# Patient Record
Sex: FEMALE | Race: WHITE | Employment: OTHER | URBAN - METROPOLITAN AREA
[De-identification: names, ages, dates, MRNs, and addresses within clinical notes are randomized per-mention and may not be internally consistent; named-entity substitution may affect disease eponyms.]

---

## 2017-04-06 ENCOUNTER — DOCUMENTATION ONLY (OUTPATIENT)
Dept: INTERNAL MEDICINE CLINIC | Age: 72
End: 2017-04-06

## 2017-04-10 ENCOUNTER — APPOINTMENT (OUTPATIENT)
Dept: ULTRASOUND IMAGING | Age: 72
DRG: 438 | End: 2017-04-10
Attending: PHYSICIAN ASSISTANT
Payer: COMMERCIAL

## 2017-04-10 ENCOUNTER — APPOINTMENT (OUTPATIENT)
Dept: GENERAL RADIOLOGY | Age: 72
DRG: 438 | End: 2017-04-10
Attending: FAMILY MEDICINE
Payer: COMMERCIAL

## 2017-04-10 ENCOUNTER — APPOINTMENT (OUTPATIENT)
Dept: CT IMAGING | Age: 72
DRG: 438 | End: 2017-04-10
Attending: PHYSICIAN ASSISTANT
Payer: COMMERCIAL

## 2017-04-10 ENCOUNTER — HOSPITAL ENCOUNTER (INPATIENT)
Age: 72
LOS: 2 days | Discharge: HOME | DRG: 438 | End: 2017-04-14
Attending: EMERGENCY MEDICINE | Admitting: INTERNAL MEDICINE
Payer: COMMERCIAL

## 2017-04-10 ENCOUNTER — APPOINTMENT (OUTPATIENT)
Dept: GENERAL RADIOLOGY | Age: 72
DRG: 438 | End: 2017-04-10
Attending: EMERGENCY MEDICINE
Payer: COMMERCIAL

## 2017-04-10 DIAGNOSIS — K85.90 ACUTE PANCREATITIS, UNSPECIFIED COMPLICATION STATUS, UNSPECIFIED PANCREATITIS TYPE: Primary | ICD-10-CM

## 2017-04-10 LAB
ALBUMIN SERPL BCP-MCNC: 3.9 G/DL (ref 3.5–5)
ALBUMIN/GLOB SERPL: 1.1 {RATIO} (ref 1.1–2.2)
ALP SERPL-CCNC: 57 U/L (ref 45–117)
ALT SERPL-CCNC: 27 U/L (ref 12–78)
ANION GAP BLD CALC-SCNC: 8 MMOL/L (ref 5–15)
APPEARANCE UR: CLEAR
AST SERPL W P-5'-P-CCNC: 17 U/L (ref 15–37)
ATRIAL RATE: 73 BPM
BACTERIA URNS QL MICRO: NEGATIVE /HPF
BASOPHILS # BLD AUTO: 0 K/UL (ref 0–0.1)
BASOPHILS # BLD: 0 % (ref 0–1)
BILIRUB SERPL-MCNC: 0.4 MG/DL (ref 0.2–1)
BILIRUB UR QL: NEGATIVE
BUN SERPL-MCNC: 18 MG/DL (ref 6–20)
BUN/CREAT SERPL: 21 (ref 12–20)
CALCIUM SERPL-MCNC: 8.9 MG/DL (ref 8.5–10.1)
CALCULATED P AXIS, ECG09: 43 DEGREES
CALCULATED R AXIS, ECG10: 23 DEGREES
CALCULATED T AXIS, ECG11: 40 DEGREES
CHLORIDE SERPL-SCNC: 106 MMOL/L (ref 97–108)
CHOLEST SERPL-MCNC: 181 MG/DL
CO2 SERPL-SCNC: 25 MMOL/L (ref 21–32)
COLOR UR: ABNORMAL
CREAT SERPL-MCNC: 0.86 MG/DL (ref 0.55–1.02)
DIAGNOSIS, 93000: NORMAL
EOSINOPHIL # BLD: 0.1 K/UL (ref 0–0.4)
EOSINOPHIL NFR BLD: 1 % (ref 0–7)
EPITH CASTS URNS QL MICRO: ABNORMAL /LPF
ERYTHROCYTE [DISTWIDTH] IN BLOOD BY AUTOMATED COUNT: 13.3 % (ref 11.5–14.5)
GLOBULIN SER CALC-MCNC: 3.4 G/DL (ref 2–4)
GLUCOSE SERPL-MCNC: 156 MG/DL (ref 65–100)
GLUCOSE UR STRIP.AUTO-MCNC: NEGATIVE MG/DL
HCT VFR BLD AUTO: 38.1 % (ref 35–47)
HDLC SERPL-MCNC: 91 MG/DL
HDLC SERPL: 2 {RATIO} (ref 0–5)
HGB BLD-MCNC: 12.6 G/DL (ref 11.5–16)
HGB UR QL STRIP: NEGATIVE
HYALINE CASTS URNS QL MICRO: ABNORMAL /LPF (ref 0–5)
KETONES UR QL STRIP.AUTO: 15 MG/DL
LDLC SERPL CALC-MCNC: 79 MG/DL (ref 0–100)
LEUKOCYTE ESTERASE UR QL STRIP.AUTO: ABNORMAL
LIPASE SERPL-CCNC: >3000 U/L (ref 73–393)
LIPID PROFILE,FLP: NORMAL
LYMPHOCYTES # BLD AUTO: 11 % (ref 12–49)
LYMPHOCYTES # BLD: 1.4 K/UL (ref 0.8–3.5)
MAGNESIUM SERPL-MCNC: 2.2 MG/DL (ref 1.6–2.4)
MCH RBC QN AUTO: 30.5 PG (ref 26–34)
MCHC RBC AUTO-ENTMCNC: 33.1 G/DL (ref 30–36.5)
MCV RBC AUTO: 92.3 FL (ref 80–99)
MONOCYTES # BLD: 0.7 K/UL (ref 0–1)
MONOCYTES NFR BLD AUTO: 6 % (ref 5–13)
NEUTS SEG # BLD: 10.2 K/UL (ref 1.8–8)
NEUTS SEG NFR BLD AUTO: 82 % (ref 32–75)
NITRITE UR QL STRIP.AUTO: NEGATIVE
P-R INTERVAL, ECG05: 152 MS
PH UR STRIP: 7 [PH] (ref 5–8)
PLATELET # BLD AUTO: 239 K/UL (ref 150–400)
POTASSIUM SERPL-SCNC: 3.5 MMOL/L (ref 3.5–5.1)
PROT SERPL-MCNC: 7.3 G/DL (ref 6.4–8.2)
PROT UR STRIP-MCNC: NEGATIVE MG/DL
Q-T INTERVAL, ECG07: 418 MS
QRS DURATION, ECG06: 74 MS
QTC CALCULATION (BEZET), ECG08: 460 MS
RBC # BLD AUTO: 4.13 M/UL (ref 3.8–5.2)
RBC #/AREA URNS HPF: ABNORMAL /HPF (ref 0–5)
SODIUM SERPL-SCNC: 139 MMOL/L (ref 136–145)
SP GR UR REFRACTOMETRY: 1.01 (ref 1–1.03)
TRIGL SERPL-MCNC: 55 MG/DL (ref ?–150)
UROBILINOGEN UR QL STRIP.AUTO: 0.2 EU/DL (ref 0.2–1)
VENTRICULAR RATE, ECG03: 73 BPM
VLDLC SERPL CALC-MCNC: 11 MG/DL
WBC # BLD AUTO: 12.5 K/UL (ref 3.6–11)
WBC URNS QL MICRO: ABNORMAL /HPF (ref 0–4)

## 2017-04-10 PROCEDURE — 93005 ELECTROCARDIOGRAM TRACING: CPT

## 2017-04-10 PROCEDURE — 85025 COMPLETE CBC W/AUTO DIFF WBC: CPT | Performed by: EMERGENCY MEDICINE

## 2017-04-10 PROCEDURE — 99218 HC RM OBSERVATION: CPT

## 2017-04-10 PROCEDURE — 96361 HYDRATE IV INFUSION ADD-ON: CPT

## 2017-04-10 PROCEDURE — 81001 URINALYSIS AUTO W/SCOPE: CPT | Performed by: EMERGENCY MEDICINE

## 2017-04-10 PROCEDURE — 71010 XR CHEST PORT: CPT

## 2017-04-10 PROCEDURE — 99285 EMERGENCY DEPT VISIT HI MDM: CPT

## 2017-04-10 PROCEDURE — 71020 XR CHEST PA LAT: CPT

## 2017-04-10 PROCEDURE — 96374 THER/PROPH/DIAG INJ IV PUSH: CPT

## 2017-04-10 PROCEDURE — 96376 TX/PRO/DX INJ SAME DRUG ADON: CPT

## 2017-04-10 PROCEDURE — 80061 LIPID PANEL: CPT | Performed by: FAMILY MEDICINE

## 2017-04-10 PROCEDURE — 74011250636 HC RX REV CODE- 250/636: Performed by: NURSE PRACTITIONER

## 2017-04-10 PROCEDURE — 36415 COLL VENOUS BLD VENIPUNCTURE: CPT | Performed by: EMERGENCY MEDICINE

## 2017-04-10 PROCEDURE — 74011250636 HC RX REV CODE- 250/636: Performed by: PHYSICIAN ASSISTANT

## 2017-04-10 PROCEDURE — 74011000250 HC RX REV CODE- 250: Performed by: FAMILY MEDICINE

## 2017-04-10 PROCEDURE — 74011250636 HC RX REV CODE- 250/636: Performed by: FAMILY MEDICINE

## 2017-04-10 PROCEDURE — 83735 ASSAY OF MAGNESIUM: CPT | Performed by: FAMILY MEDICINE

## 2017-04-10 PROCEDURE — 74011000250 HC RX REV CODE- 250: Performed by: NURSE PRACTITIONER

## 2017-04-10 PROCEDURE — 80053 COMPREHEN METABOLIC PANEL: CPT | Performed by: EMERGENCY MEDICINE

## 2017-04-10 PROCEDURE — 96375 TX/PRO/DX INJ NEW DRUG ADDON: CPT

## 2017-04-10 PROCEDURE — 83690 ASSAY OF LIPASE: CPT | Performed by: PHYSICIAN ASSISTANT

## 2017-04-10 PROCEDURE — 76705 ECHO EXAM OF ABDOMEN: CPT

## 2017-04-10 PROCEDURE — 87086 URINE CULTURE/COLONY COUNT: CPT | Performed by: FAMILY MEDICINE

## 2017-04-10 RX ORDER — SODIUM CHLORIDE 0.9 % (FLUSH) 0.9 %
5-10 SYRINGE (ML) INJECTION AS NEEDED
Status: DISCONTINUED | OUTPATIENT
Start: 2017-04-10 | End: 2017-04-14 | Stop reason: HOSPADM

## 2017-04-10 RX ORDER — ONDANSETRON 2 MG/ML
4 INJECTION INTRAMUSCULAR; INTRAVENOUS
Status: DISCONTINUED | OUTPATIENT
Start: 2017-04-10 | End: 2017-04-14 | Stop reason: HOSPADM

## 2017-04-10 RX ORDER — HYDROMORPHONE HYDROCHLORIDE 1 MG/ML
1 INJECTION, SOLUTION INTRAMUSCULAR; INTRAVENOUS; SUBCUTANEOUS
Status: DISCONTINUED | OUTPATIENT
Start: 2017-04-10 | End: 2017-04-14 | Stop reason: HOSPADM

## 2017-04-10 RX ORDER — SODIUM CHLORIDE, SODIUM LACTATE, POTASSIUM CHLORIDE, CALCIUM CHLORIDE 600; 310; 30; 20 MG/100ML; MG/100ML; MG/100ML; MG/100ML
125 INJECTION, SOLUTION INTRAVENOUS CONTINUOUS
Status: DISCONTINUED | OUTPATIENT
Start: 2017-04-10 | End: 2017-04-14 | Stop reason: HOSPADM

## 2017-04-10 RX ORDER — ONDANSETRON 2 MG/ML
4 INJECTION INTRAMUSCULAR; INTRAVENOUS
Status: COMPLETED | OUTPATIENT
Start: 2017-04-10 | End: 2017-04-10

## 2017-04-10 RX ORDER — HYDROMORPHONE HYDROCHLORIDE 1 MG/ML
1 INJECTION, SOLUTION INTRAMUSCULAR; INTRAVENOUS; SUBCUTANEOUS
Status: DISCONTINUED | OUTPATIENT
Start: 2017-04-10 | End: 2017-04-10

## 2017-04-10 RX ORDER — MORPHINE SULFATE 4 MG/ML
4 INJECTION, SOLUTION INTRAMUSCULAR; INTRAVENOUS
Status: COMPLETED | OUTPATIENT
Start: 2017-04-10 | End: 2017-04-10

## 2017-04-10 RX ORDER — SODIUM CHLORIDE 0.9 % (FLUSH) 0.9 %
10 SYRINGE (ML) INJECTION
Status: DISPENSED | OUTPATIENT
Start: 2017-04-10 | End: 2017-04-11

## 2017-04-10 RX ORDER — ATORVASTATIN CALCIUM 10 MG/1
10 TABLET, FILM COATED ORAL DAILY
COMMUNITY
End: 2017-05-29 | Stop reason: SDUPTHER

## 2017-04-10 RX ORDER — GLUCOSAMINE/CHONDR SU A SOD 750-600 MG
5000 TABLET ORAL DAILY
COMMUNITY
End: 2017-12-15

## 2017-04-10 RX ORDER — HYDROMORPHONE HYDROCHLORIDE 1 MG/ML
0.5 INJECTION, SOLUTION INTRAMUSCULAR; INTRAVENOUS; SUBCUTANEOUS
Status: COMPLETED | OUTPATIENT
Start: 2017-04-10 | End: 2017-04-10

## 2017-04-10 RX ORDER — SODIUM CHLORIDE 0.9 % (FLUSH) 0.9 %
5-10 SYRINGE (ML) INJECTION EVERY 8 HOURS
Status: DISCONTINUED | OUTPATIENT
Start: 2017-04-10 | End: 2017-04-12

## 2017-04-10 RX ADMIN — SODIUM CHLORIDE 1000 ML: 900 INJECTION, SOLUTION INTRAVENOUS at 11:32

## 2017-04-10 RX ADMIN — HYDROMORPHONE HYDROCHLORIDE 1 MG: 1 INJECTION, SOLUTION INTRAMUSCULAR; INTRAVENOUS; SUBCUTANEOUS at 15:10

## 2017-04-10 RX ADMIN — HYDROMORPHONE HYDROCHLORIDE 0.5 MG: 1 INJECTION, SOLUTION INTRAMUSCULAR; INTRAVENOUS; SUBCUTANEOUS at 12:47

## 2017-04-10 RX ADMIN — SODIUM CHLORIDE 10 MG: 9 INJECTION INTRAMUSCULAR; INTRAVENOUS; SUBCUTANEOUS at 21:07

## 2017-04-10 RX ADMIN — FAMOTIDINE 20 MG: 10 INJECTION, SOLUTION INTRAVENOUS at 21:01

## 2017-04-10 RX ADMIN — HYDROMORPHONE HYDROCHLORIDE 1 MG: 1 INJECTION, SOLUTION INTRAMUSCULAR; INTRAVENOUS; SUBCUTANEOUS at 17:31

## 2017-04-10 RX ADMIN — Medication 10 ML: at 21:11

## 2017-04-10 RX ADMIN — ONDANSETRON 4 MG: 2 INJECTION INTRAMUSCULAR; INTRAVENOUS at 11:30

## 2017-04-10 RX ADMIN — ONDANSETRON 4 MG: 2 INJECTION INTRAMUSCULAR; INTRAVENOUS at 17:31

## 2017-04-10 RX ADMIN — Medication 10 ML: at 21:02

## 2017-04-10 RX ADMIN — SODIUM CHLORIDE 1000 ML: 900 INJECTION, SOLUTION INTRAVENOUS at 11:33

## 2017-04-10 RX ADMIN — Medication 4 MG: at 11:58

## 2017-04-10 RX ADMIN — HYDROMORPHONE HYDROCHLORIDE 1 MG: 1 INJECTION, SOLUTION INTRAMUSCULAR; INTRAVENOUS; SUBCUTANEOUS at 20:16

## 2017-04-10 RX ADMIN — SODIUM CHLORIDE, SODIUM LACTATE, POTASSIUM CHLORIDE, AND CALCIUM CHLORIDE 125 ML/HR: 600; 310; 30; 20 INJECTION, SOLUTION INTRAVENOUS at 17:12

## 2017-04-10 NOTE — IP AVS SNAPSHOT
Current Discharge Medication List  
  
CONTINUE these medications which have NOT CHANGED Dose & Instructions Dispensing Information Comments Morning Noon Evening Bedtime Acid Reducer 75 mg tablet Generic drug:  raNITIdine Your last dose was: Your next dose is:    
   
   
 Dose:  75 mg Take 75 mg by mouth daily. Refills:  0  
     
   
   
   
  
 anastrozole 1 mg tablet Commonly known as:  ARIMIDEX Your last dose was: Your next dose is:    
   
   
 Dose:  1 mg Take 1 Tab by mouth daily. Refills:  0 Biotin 2,500 mcg Cap Your last dose was: Your next dose is:    
   
   
 Dose:  5000 mcg Take 5,000 mcg by mouth daily. Refills:  0 LIPITOR 10 mg tablet Generic drug:  atorvastatin Your last dose was: Your next dose is:    
   
   
 Dose:  10 mg Take 10 mg by mouth daily. Refills:  0 PROLIA 60 mg/mL injection Generic drug:  denosumab Your last dose was: Your next dose is:    
   
   
 Dose:  60 mg  
60 mg by SubCUTAneous route. every 6 months Refills:  0

## 2017-04-10 NOTE — ED NOTES
EKG interpretation: (Preliminary)  Rhythm: normal sinus rhythm; and regular . Rate (approx.): 73;  Axis: normal; WA interval: normal; QRS interval: normal ; ST/T wave: non-specific changes; t wave inversion V2, no changes from EKG [id number]

## 2017-04-10 NOTE — IP AVS SNAPSHOT
[address] [facility] [address] 
[phone] Patient: Eva Nation MRN: [id number] TAVO:[birth year] You are allergic to the following Allergen Reactions Prilosec (Omeprazole) Other (comments) Leg cramps Zinc Swelling Jewelry Recent Documentation Height Weight BMI OB Status Smoking Status 1.6 m 57.8 kg 22.57 kg/m2 Postmenopausal Never Smoker Emergency Contacts Name Discharge Info Relation Home Work Mobile Emeka Tang DISCHARGE CAREGIVER [3] Daughter [21] [phone] About your hospitalization You were admitted on:  April 10, 2017 You last received care in the:  [facility] 5 OBSERVATION You were discharged on:  April 14, 2017 Unit phone number:  [phone] Why you were hospitalized Your primary diagnosis was:  Pancreatitis Providers Seen During Your Hospitalizations Provider Role Specialty Primary office phone Rashid Reyes MD Attending Provider Emergency Medicine [phone] Chris Beatty MD Attending Provider Hospitalist [phone] Lucretia Smith MD Attending Provider Internal Medicine [phone] Your Primary Care Physician (PCP) Primary Care Physician Office Phone Office Fax Rajesh Dineromarybeth [phone] Follow-up Information Follow up With Details Comments Contact Info Laura Samuels MD Schedule an appointment as soon as possible for a visit in 1 week hospital fu as needed [place] [address] [phone] Ryan Jackson MD Schedule an appointment as soon as possible for a visit in 1 week hospital fu for pancreatitis [address] [address] [address] 
[phone] Current Discharge Medication List  
  
CONTINUE these medications which have NOT CHANGED Dose & Instructions Dispensing Information Comments Morning Noon Evening Bedtime Acid Reducer 75 mg tablet Generic drug:  raNITIdine Your last dose was: Your next dose is:    
   
   
 Dose:  75 mg Take 75 mg by mouth daily. Refills:  0  
     
   
   
   
  
 anastrozole 1 mg tablet Commonly known as:  ARIMIDEX Your last dose was: Your next dose is:    
   
   
 Dose:  1 mg Take 1 Tab by mouth daily. Refills:  0 Biotin 2,500 mcg Cap Your last dose was: Your next dose is:    
   
   
 Dose:  5000 mcg Take 5,000 mcg by mouth daily. Refills:  0 LIPITOR 10 mg tablet Generic drug:  atorvastatin Your last dose was: Your next dose is:    
   
   
 Dose:  10 mg Take 10 mg by mouth daily. Refills:  0 PROLIA 60 mg/mL injection Generic drug:  denosumab Your last dose was: Your next dose is:    
   
   
 Dose:  60 mg  
60 mg by SubCUTAneous route. every 6 months Refills:  0 Discharge Instructions Discharge Instructions PATIENT ID: [facility] MRN: [id number] YOB: 1945 DATE OF ADMISSION: 4/10/2017 11:06 AM   
DATE OF DISCHARGE: 4/14/2017 PRIMARY CARE PROVIDER: Leonor Stephens MD  
 
 
DISCHARGING PHYSICIAN: Sonal Cohen MD   
To contact this individual call [phone] and ask the  to page. If unavailable ask to be transferred the Adult Hospitalist Department. DISCHARGE DIAGNOSES Acute Pancreatitis CONSULTATIONS: IP CONSULT TO HOSPITALIST 
IP CONSULT TO GASTROENTEROLOGY PROCEDURES/SURGERIES: * No surgery found * PENDING TEST RESULTS:  
At the time of discharge the following test results are still pending: na 
 
FOLLOW UP APPOINTMENTS:  
Follow-up Information Follow up With Details Comments Contact Info Leonor Stephens MD Schedule an appointment as soon as possible for a visit in 1 week hospital fu as needed [address] [place] [address] [phone] Pat Robles MD Schedule an appointment as soon as possible for a visit in 1 week hospital fu for pancreatitis [address] [place] [address] 
[phone] ADDITIONAL CARE RECOMMENDATIONS: Follow up with Dr. Agus Dotson DIET: pancreatitis diet,low fat, avoid alcohol ACTIVITY: resume WOUND CARE: na 
 
EQUIPMENT needed: na 
 
 
  
 SNF/Inpatient Rehab/LTAC  
x Independent/assisted living Hospice Other: CDMP Checked:  
Yes x Signed:  
Steve Alva MD 
4/14/2017 
11:22 AM 
 
Discharge Orders None ContactUs.comHospital for [facility] CareAnsible Announcement We are excited to announce that we are making your provider's discharge notes available to you in Micronotes. You will see these notes when they are completed and signed by the physician that discharged you from your recent hospital stay. If you have any questions or concerns about any information you see in dinCloudt, please call the Health Information Department where you were seen or reach out to your Primary Care Provider for more information about your plan of care. Introducing [facility]! Dear Juan David Hammonds: Thank you for requesting a ThoughtSpot account. Our records indicate that you already have an active ThoughtSpot account. You can access your account anytime at [email or web address] Did you know that you can access your hospital and ER discharge instructions at any time in ThoughtSpot? You can also review all of your test results from your hospital stay or ER visit. Additional Information If you have questions, please visit the Frequently Asked Questions section of the ThoughtSpot website at [email or web address] Remember, ThoughtSpot is NOT to be used for urgent needs. For medical emergencies, dial 911. Now available from your iPhone and Android! General Information Please provide this summary of care documentation to your next provider. Patient Signature:  ____________________________________________________________ Date:  ____________________________________________________________  
  
Umer Mtaa Provider Signature:  ____________________________________________________________ Date:  ____________________________________________________________

## 2017-04-10 NOTE — ED NOTES
Bedside and Verbal shift change report given to Lucita Winchester (oncoming nurse) by Kendra Gao (offgoing nurse).  Report included the following information SBAR, ED Summary, Intake/Output, MAR and Recent Results, NSR

## 2017-04-10 NOTE — PROGRESS NOTES
Admission Medication Reconciliation:    Information obtained from: Patient and family member    Significant PMH/Disease States:   Past Medical History:   Diagnosis Date    breast cancer May 2010    R breast, stage IA; T1b,pN0, M0; Dr. Ty Baires    Hypercholesterolemia     Osteoporosis        Chief Complaint for this Admission:    Chief Complaint   Patient presents with    Abdominal Pain    Vomiting         Allergies:  Prilosec [omeprazole] and Zinc    Prior to Admission Medications:   Prior to Admission Medications   Prescriptions Last Dose Informant Patient Reported? Taking? Biotin 2,500 mcg cap 2017 at Unknown time  Yes Yes   Sig: Take 5,000 mcg by mouth daily. Denosumab (PROLIA) 60 mg/mL injection   Yes Yes   Si mg by SubCUTAneous route. every 6 months   anastrozole (ARIMIDEX) 1 mg tablet 2017 at 1700  Yes Yes   Sig: Take 1 Tab by mouth daily. atorvastatin (LIPITOR) 10 mg tablet 2017 at PM  Yes Yes   Sig: Take 10 mg by mouth daily. ranitidine (ACID REDUCER) 75 mg tablet 2017 at Unknown time  Yes Yes   Sig: Take 75 mg by mouth daily. Facility-Administered Medications: None         Comments/Recommendations: Updated PTA meds and confirmed allergies. 1) Deleted calcium and vit d  2) Updated dose and directions for biotin  3) Pt had denosumab injection in 2017.

## 2017-04-10 NOTE — ED TRIAGE NOTES
Triage Note: \"I woke up at 6AM with dry heaves, I have excruciating burning in my abdomen around to the right and into my back, if I had to self diagnose I would say it's appendicitis, it's such a horrible pain. \" Patient reports going to bed last night \"fine\" and woke this morning suddenly with dry heaving and abdominal pain.

## 2017-04-10 NOTE — ED NOTES
TRANSFER - OUT REPORT:    Verbal report given to Gisela Sood RN on [facility]  being transferred to [facility] for routine progression of care       Report consisted of patients Situation, Background, Assessment and   Recommendations(SBAR). Information from the following report(s) SBAR, ED Summary, [facility] and Recent Results was reviewed with the receiving nurse. Lines:   Peripheral IV 04/10/17 Left Wrist (Active)   Site Assessment Clean, dry, & intact 4/10/2017  7:27 PM   Phlebitis Assessment 0 4/10/2017  7:27 PM   Infiltration Assessment 0 4/10/2017  7:27 PM   Dressing Status Clean, dry, & intact 4/10/2017  7:27 PM   Hub Color/Line Status Pink 4/10/2017  7:27 PM        Opportunity for questions and clarification was provided.       Patient transported with:   [employer]

## 2017-04-10 NOTE — H&P
[address] [facility] [address]   HISTORY AND PHYSICAL       Name:  Mal Kingston   MR#:  [id number]   :  1945   Account #:  [de-identified]        Date of Adm:  04/10/2017       CHIEF COMPLAINT: Abdominal pain. HISTORY OF PRESENT ILLNESS: The patient is a 21-year-old   female with past medical history significant for breast cancer,   osteoporosis and hypercholesterolemia, who presents to the hospital   complaining of the above-mentioned symptoms. The patient reports   she woke up at 6 this morning and had significant pain in her epigastric   region. The patient reports that she was at her baseline health until last   night. The patient started having some nausea, associated with   multiple bouts of vomiting and dry heaves. Reports that the pain got   unbearable, and she decided to come to the hospital. The patient   reports that she felt bloated and the pain was \"burning and achy. \" The   patient reports that the pain went down from her abdomen to her back,   and nothing helps relieve the pain. The patient reports that she had   some ginger ale and a small amount of toast this morning, but threw it   right up. The patient denies any other complaints or problems. Denies   any headache, blurry vision, sore throat, trouble swallowing, trouble   with speech, any chest pain, shortness of breath, cough, fever, chills,   diarrhea, urinary symptoms, focal or generalized neurological   weakness, recent travels or sick contacts. The patient also denies   starting any new medications, any history of gallbladder disease or   gallbladder stones, any history of regular alcohol consumption or any   history of pancreatitis in the past.    PAST MEDICAL HISTORY: See above. HOME MEDICATIONS: Currently the patient is on   2. Lipitor 10 mg daily. 3. Biotin 5000 mcg daily. 4. Ranitidine 75 mg daily. 5. Prolia 60 mg subcutaneous every 6 months.    6. Arimidex 1 tablet daily.    SOCIAL HISTORY: The patient reports that she drinks alcohol   occasionally, not more than 1 glass of wine every few weeks. Denies   tobacco abuse or drug abuse. Lives at home. REVIEW OF SYMPTOMS: A 10-point review of symptoms was done,   which was essentially negative except for symptoms mentioned above. ALLERGIES TO   1. OMEPRAZOLE. 2. ZINC. FAMILY HISTORY: Mother had history of osteoporosis and heart   failure, and father had history of colon cancer and lung cancer. PHYSICAL EXAMINATION   VITAL SIGNS: Temperature 97.9, pulse 72, respiratory rate 14, blood   pressure 158/80, pulse oximetry 100% on room air. GENERAL: Alert x3, awake, mildly distressed, pleasant female, who   appears to be stated age. HEENT: Pupils equal and reactive to light. Dry mucous membranes. Tympanic membranes clear. NECK: Supple. CHEST: Clear to auscultation bilaterally. CORONARY: S1 and S2 were heard. ABDOMEN: Soft, tender to palpation diffusely. No rebound. Mild   guarding. Bowel sounds are hypoactive. EXTREMITIES: No clubbing, no cyanosis, no edema. NEUROPSYCHIATRIC: Pleasant mood and affect. Sensory grossly   within normal limits. DTRs 2+. Strength 5/5. SKIN: Warm. LABORATORY DATA: White count 12.5, hemoglobin 12.6, hematocrit   38.1, platelets 634. Urine shows no signs of infection. Sodium 139,   potassium 3.5, chloride 106, bicarbonate 25, glucose 156, BUN 18,   creatinine 0.86, calcium 8.9. ALT 27, AST 17, alkaline phosphatase 57,   lipase is greater than 3000. DIAGNOSTIC DATA: X-ray of the chest shows the lungs are clear to   auscultation. Ultrasound of the abdomen shows borderline dilated   pancreatic duct at 3.3 mm, with trace pericholecystic fluid. ASSESSMENT AND PLAN   1. Acute pancreatitis. The patient will be admitted to the hospital. Will   start the patient on aggressive IV hydration, pain control, antiemetics   and close monitoring. Will check a fasting lipid profile. Ultrasound of   the abdomen does not show any significant cholecystitis or ductal   stone. Will closely monitor, and further intervention will be per hospital   course. 2. Leukocytosis, unclear etiology. Will continue to monitor urinalysis. A   chest x-ray has been ordered. Most likely a stress response secondary   to pain. Further intervention will be per hospital course. No obvious   signs of infection. Will hold antibiotics for now. 3. Gastrointestinal and deep vein thrombosis prophylaxis. The patient   will be on sequential compression devices.         Emeka Mendoza MD MM / Alexis Campoverde   D:  04/10/2017   16:12   T:  04/10/2017   16:44   Job #:  [id number]

## 2017-04-10 NOTE — ED PROVIDER NOTES
HPI Comments: 71 y.o. female with past medical history significant for breast CA, osteoporosis, and hypercholesterolemia who presents with chief complaint of abdominal pain. Pt reports that today at 0600 (5.5 hours ago), she woke with nausea and \"dry heaving. \" After onset of these sx, pt began to experience diffuse upper abd pain and \"bloating. \" Pt describes her sx as \"burning and achy\" pain that radiates throughout her entire upper abdomen (R>L) and into her mid back. This pain has progressively worsened since onset. Pt rates pain as 10/10. There are no alleviating factors, pain was somewhat worsened after attempting to eating toast this morning. Last BM was this morning and normal. Pt denies h/o abd surgeries. Pt denies recent immobilization or prolonged travel. Pt is unsure of fever. She denies diarrhea, constipation, melena, dysuria, hematuria, CP, SOB, hemoptysis, or leg swelling. There are no other acute medical concerns at this time. Social hx: . Denies frequent alcohol use but did \"split a bottle of bordeaux\" with a family member yesterday    PCP: Soni Henry MD    Note written by Jeniffer Barrientos. Kathy Cohen, as dictated by Shannan Painting PA-C 11:25 AM      The history is provided by the patient. No  was used. Past Medical History:   Diagnosis Date    breast cancer May 2010    R breast, stage IA; T1b,pN0, M0; Dr. Una Baker    Hypercholesterolemia     Osteoporosis        Past Surgical History:   Procedure Laterality Date    HX BREAST LUMPECTOMY           Family History:   Problem Relation Age of Onset    Osteoporosis Mother     Heart Failure Mother     Colon Cancer Father     Cancer Father      lung & colon    Cancer Sister      skin       Social History     Social History    Marital status:      Spouse name: N/A    Number of children: N/A    Years of education: N/A     Occupational History    Not on file.      Social History Main Topics    Smoking status: Never Smoker    Smokeless tobacco: Never Used    Alcohol use 3.5 oz/week     7 Glasses of wine per week    Drug use: Not on file    Sexual activity: Not on file     Other Topics Concern    Not on file     Social History Narrative         ALLERGIES: Prilosec [omeprazole] and Zinc    Review of Systems   Constitutional: Negative for fever. HENT: Negative for rhinorrhea and sore throat. Respiratory: Negative for shortness of breath. No hemoptysis   Cardiovascular: Negative for chest pain and leg swelling. Gastrointestinal: Positive for abdominal distention, abdominal pain, nausea and vomiting. Negative for blood in stool, constipation and diarrhea. Genitourinary: Negative for difficulty urinating, dysuria, frequency and hematuria. Musculoskeletal: Negative for neck stiffness. Skin: Negative for rash and wound. Neurological: Negative for syncope. All other systems reviewed and are negative. Vitals:    04/10/17 1038   BP: 155/73   Pulse: 72   Resp: 14   Temp: 97.9 °F (36.6 °C)   SpO2: 100%   Weight: 57.8 kg (127 lb 6.4 oz)   Height: 5' 3\" (1.6 m)            Physical Exam   Constitutional: She is oriented to person, place, and time. She appears well-developed and well-nourished. She appears distressed. Pleasant WF, moderate pain distress   HENT:   Head: Normocephalic and atraumatic. Right Ear: External ear normal.   Left Ear: External ear normal.   Eyes: Conjunctivae are normal. No scleral icterus. Neck: Neck supple. No tracheal deviation present. Cardiovascular: Normal rate, regular rhythm and normal heart sounds. Exam reveals no gallop and no friction rub. No murmur heard. Pulmonary/Chest: Effort normal and breath sounds normal. No stridor. No respiratory distress. She has no wheezes. Abdominal: Soft. She exhibits no distension. There is tenderness. There is rebound.  There is no guarding.   + epigastric and less so RUQ/LUQ tenderness  Negative Hunt's sign  + rebound  No guarding  No CVAT   Musculoskeletal: Normal range of motion. Neurological: She is alert and oriented to person, place, and time. Skin: Skin is warm and dry. Psychiatric: She has a normal mood and affect. Her behavior is normal.   Nursing note and vitals reviewed. MDM  Number of Diagnoses or Management Options  Diagnosis management comments: 71year old presenting for acute onset generalized abd pain with radiation to the back and N/V this morning. Abdomen with epigastric and generalized upper abdominal TTP, non-focal.  VS WNL. Labs remarkable for leukocytosis and lipase >3000. Pt denies frequent alcohol use but did recently split a bottle of wine with a friend. US with pericholecystic fluid, borderline dilated pancreatic duct. Pt admitted to the medicine service. Amount and/or Complexity of Data Reviewed  Clinical lab tests: ordered and reviewed  Tests in the radiology section of CPT®: ordered and reviewed  Discuss the patient with other providers: yes (Dr. Bob Valdes, ED attending. Dr. Daniela Brennan, hospitalist.)  Independent visualization of images, tracings, or specimens: yes (US)      ED Course       Procedures    CONSULT NOTE:  2:14 PM Yemi Cho PA-C spoke with Dr. Daniela Brennan, Consult for Hospitalist.  Discussed available diagnostic tests and clinical findings. He is in agreement with care plans as outlined.   Dr. Daniela Brennan will assess the patient for admission to the hospital.

## 2017-04-11 ENCOUNTER — APPOINTMENT (OUTPATIENT)
Dept: CT IMAGING | Age: 72
DRG: 438 | End: 2017-04-11
Attending: INTERNAL MEDICINE
Payer: COMMERCIAL

## 2017-04-11 LAB
ANION GAP BLD CALC-SCNC: 10 MMOL/L (ref 5–15)
BASOPHILS # BLD AUTO: 0 K/UL (ref 0–0.1)
BASOPHILS # BLD: 0 % (ref 0–1)
BLASTS NFR BLD: 0 %
BUN SERPL-MCNC: 12 MG/DL (ref 6–20)
BUN/CREAT SERPL: 18 (ref 12–20)
CALCIUM SERPL-MCNC: 7.6 MG/DL (ref 8.5–10.1)
CHLORIDE SERPL-SCNC: 108 MMOL/L (ref 97–108)
CO2 SERPL-SCNC: 24 MMOL/L (ref 21–32)
CREAT SERPL-MCNC: 0.65 MG/DL (ref 0.55–1.02)
CRP SERPL-MCNC: <0.29 MG/DL (ref 0–0.6)
DIFFERENTIAL METHOD BLD: ABNORMAL
EOSINOPHIL # BLD: 0.1 K/UL (ref 0–0.4)
EOSINOPHIL NFR BLD: 1 % (ref 0–7)
ERYTHROCYTE [DISTWIDTH] IN BLOOD BY AUTOMATED COUNT: 13.5 % (ref 11.5–14.5)
GLUCOSE SERPL-MCNC: 94 MG/DL (ref 65–100)
HCT VFR BLD AUTO: 31.3 % (ref 35–47)
HGB BLD-MCNC: 10.3 G/DL (ref 11.5–16)
LACTATE SERPL-SCNC: 0.6 MMOL/L (ref 0.4–2)
LIPASE SERPL-CCNC: >3000 U/L (ref 73–393)
LYMPHOCYTES # BLD AUTO: 12 % (ref 12–49)
LYMPHOCYTES # BLD: 1.6 K/UL (ref 0.8–3.5)
MAGNESIUM SERPL-MCNC: 1.9 MG/DL (ref 1.6–2.4)
MANUAL DIFFERENTIAL PERFORMED BLD QL: ABNORMAL
MCH RBC QN AUTO: 30.5 PG (ref 26–34)
MCHC RBC AUTO-ENTMCNC: 32.9 G/DL (ref 30–36.5)
MCV RBC AUTO: 92.6 FL (ref 80–99)
METAMYELOCYTES NFR BLD MANUAL: 0 %
MONOCYTES # BLD: 0.9 K/UL (ref 0–1)
MONOCYTES NFR BLD AUTO: 7 % (ref 5–13)
MYELOCYTES NFR BLD MANUAL: 0 %
NEUTS BAND NFR BLD MANUAL: 0 % (ref 0–6)
NEUTS SEG # BLD: 10.7 K/UL (ref 1.8–8)
NEUTS SEG NFR BLD AUTO: 80 % (ref 32–75)
NRBC # BLD: 0 K/UL (ref 0–0.01)
NRBC BLD-RTO: 0 PER 100 WBC
PLATELET # BLD AUTO: 206 K/UL (ref 150–400)
POTASSIUM SERPL-SCNC: 3.3 MMOL/L (ref 3.5–5.1)
PROMYELOCYTES NFR BLD MANUAL: 0 %
RBC # BLD AUTO: 3.38 M/UL (ref 3.8–5.2)
RBC MORPH BLD: ABNORMAL
SODIUM SERPL-SCNC: 142 MMOL/L (ref 136–145)
WBC # BLD AUTO: 13.3 K/UL (ref 3.6–11)
WBC OTHER # BLD MANUAL: 0 10*3/UL

## 2017-04-11 PROCEDURE — 74177 CT ABD & PELVIS W/CONTRAST: CPT

## 2017-04-11 PROCEDURE — 74011000250 HC RX REV CODE- 250: Performed by: FAMILY MEDICINE

## 2017-04-11 PROCEDURE — 74011636320 HC RX REV CODE- 636/320: Performed by: INTERNAL MEDICINE

## 2017-04-11 PROCEDURE — 83735 ASSAY OF MAGNESIUM: CPT | Performed by: NURSE PRACTITIONER

## 2017-04-11 PROCEDURE — 36415 COLL VENOUS BLD VENIPUNCTURE: CPT | Performed by: NURSE PRACTITIONER

## 2017-04-11 PROCEDURE — 85027 COMPLETE CBC AUTOMATED: CPT | Performed by: NURSE PRACTITIONER

## 2017-04-11 PROCEDURE — 99218 HC RM OBSERVATION: CPT

## 2017-04-11 PROCEDURE — 74011250636 HC RX REV CODE- 250/636: Performed by: FAMILY MEDICINE

## 2017-04-11 PROCEDURE — 80048 BASIC METABOLIC PNL TOTAL CA: CPT | Performed by: NURSE PRACTITIONER

## 2017-04-11 PROCEDURE — 86140 C-REACTIVE PROTEIN: CPT | Performed by: NURSE PRACTITIONER

## 2017-04-11 PROCEDURE — 83690 ASSAY OF LIPASE: CPT | Performed by: NURSE PRACTITIONER

## 2017-04-11 PROCEDURE — 83605 ASSAY OF LACTIC ACID: CPT | Performed by: NURSE PRACTITIONER

## 2017-04-11 PROCEDURE — 74011250636 HC RX REV CODE- 250/636: Performed by: NURSE PRACTITIONER

## 2017-04-11 PROCEDURE — 74011000258 HC RX REV CODE- 258: Performed by: INTERNAL MEDICINE

## 2017-04-11 PROCEDURE — 77030027138 HC INCENT SPIROMETER -A

## 2017-04-11 RX ORDER — SODIUM CHLORIDE 0.9 % (FLUSH) 0.9 %
10 SYRINGE (ML) INJECTION
Status: COMPLETED | OUTPATIENT
Start: 2017-04-11 | End: 2017-04-11

## 2017-04-11 RX ORDER — POTASSIUM CHLORIDE 14.9 MG/ML
10 INJECTION INTRAVENOUS
Status: COMPLETED | OUTPATIENT
Start: 2017-04-11 | End: 2017-04-11

## 2017-04-11 RX ORDER — IPRATROPIUM BROMIDE AND ALBUTEROL SULFATE 2.5; .5 MG/3ML; MG/3ML
3 SOLUTION RESPIRATORY (INHALATION)
Status: DISCONTINUED | OUTPATIENT
Start: 2017-04-11 | End: 2017-04-14 | Stop reason: HOSPADM

## 2017-04-11 RX ADMIN — Medication 10 ML: at 08:42

## 2017-04-11 RX ADMIN — FAMOTIDINE 20 MG: 10 INJECTION, SOLUTION INTRAVENOUS at 21:04

## 2017-04-11 RX ADMIN — IOHEXOL 50 ML: 240 INJECTION, SOLUTION INTRATHECAL; INTRAVASCULAR; INTRAVENOUS; ORAL at 20:36

## 2017-04-11 RX ADMIN — IOPAMIDOL 100 ML: 755 INJECTION, SOLUTION INTRAVENOUS at 20:36

## 2017-04-11 RX ADMIN — SODIUM CHLORIDE, SODIUM LACTATE, POTASSIUM CHLORIDE, AND CALCIUM CHLORIDE 125 ML/HR: 600; 310; 30; 20 INJECTION, SOLUTION INTRAVENOUS at 01:49

## 2017-04-11 RX ADMIN — HYDROMORPHONE HYDROCHLORIDE 1 MG: 1 INJECTION, SOLUTION INTRAMUSCULAR; INTRAVENOUS; SUBCUTANEOUS at 21:04

## 2017-04-11 RX ADMIN — SODIUM CHLORIDE, SODIUM LACTATE, POTASSIUM CHLORIDE, AND CALCIUM CHLORIDE 125 ML/HR: 600; 310; 30; 20 INJECTION, SOLUTION INTRAVENOUS at 08:51

## 2017-04-11 RX ADMIN — HYDROMORPHONE HYDROCHLORIDE 1 MG: 1 INJECTION, SOLUTION INTRAMUSCULAR; INTRAVENOUS; SUBCUTANEOUS at 04:26

## 2017-04-11 RX ADMIN — ONDANSETRON 4 MG: 2 INJECTION INTRAMUSCULAR; INTRAVENOUS at 04:26

## 2017-04-11 RX ADMIN — Medication 10 ML: at 21:04

## 2017-04-11 RX ADMIN — POTASSIUM CHLORIDE 10 MEQ: 200 INJECTION, SOLUTION INTRAVENOUS at 08:51

## 2017-04-11 RX ADMIN — ONDANSETRON 4 MG: 2 INJECTION INTRAMUSCULAR; INTRAVENOUS at 21:04

## 2017-04-11 RX ADMIN — POTASSIUM CHLORIDE 10 MEQ: 200 INJECTION, SOLUTION INTRAVENOUS at 11:26

## 2017-04-11 RX ADMIN — HYDROMORPHONE HYDROCHLORIDE 1 MG: 1 INJECTION, SOLUTION INTRAMUSCULAR; INTRAVENOUS; SUBCUTANEOUS at 01:52

## 2017-04-11 RX ADMIN — SODIUM CHLORIDE 100 ML: 900 INJECTION, SOLUTION INTRAVENOUS at 20:35

## 2017-04-11 RX ADMIN — FAMOTIDINE 20 MG: 10 INJECTION, SOLUTION INTRAVENOUS at 08:45

## 2017-04-11 RX ADMIN — Medication 10 ML: at 20:35

## 2017-04-11 RX ADMIN — POTASSIUM CHLORIDE 10 MEQ: 200 INJECTION, SOLUTION INTRAVENOUS at 10:11

## 2017-04-11 RX ADMIN — HYDROMORPHONE HYDROCHLORIDE 1 MG: 1 INJECTION, SOLUTION INTRAMUSCULAR; INTRAVENOUS; SUBCUTANEOUS at 18:18

## 2017-04-11 RX ADMIN — SODIUM CHLORIDE, SODIUM LACTATE, POTASSIUM CHLORIDE, AND CALCIUM CHLORIDE 125 ML/HR: 600; 310; 30; 20 INJECTION, SOLUTION INTRAVENOUS at 16:57

## 2017-04-11 RX ADMIN — Medication 10 ML: at 13:28

## 2017-04-11 RX ADMIN — HYDROMORPHONE HYDROCHLORIDE 1 MG: 1 INJECTION, SOLUTION INTRAMUSCULAR; INTRAVENOUS; SUBCUTANEOUS at 08:41

## 2017-04-11 RX ADMIN — HYDROMORPHONE HYDROCHLORIDE 1 MG: 1 INJECTION, SOLUTION INTRAMUSCULAR; INTRAVENOUS; SUBCUTANEOUS at 13:26

## 2017-04-11 NOTE — CONSULTS
[address] [facility] NOTE  Fahad Reynaga, [address] [facility] office  [phone] NP in-hospital cell phone M-F until 4:30  After 5pm or on weekends, please call  for physician on call        NAME:  Ross Solis   :   1945   MRN:   [id number]       Referring Physician: Chance Holloway NP    Consult Date: 2017 11:40 AM    Chief Complaint: epigastric pain     History of Present Illness:  Patient is a 71 y.o. who is seen in consultation at the request of Chance Holloway NP for pancreatitis. Pt PMH as below. Acute onset of epigastric pain with vomiting last night. Elevated lipase on admission. No risk factors for pancreatitis. No similar incidents. Denies nausea, vomiting since admission. Pain well controlled on pain medication provided. Daughter at bedside. Evidence of pancreatic divisum on  abdominal CT scan. I have reviewed the emergency room note, hospital admission note, notes by all other clinicians who have seen the patient during this hospitalization to date. I have reviewed the problem list and the reason for this hospitalization. I have reviewed the allergies and the medications the patient was taking at home prior to this hospitalization. PMH:  Past Medical History:   Diagnosis Date    breast cancer May 2010    R breast, stage IA; T1b,pN0, M0; Dr. Sadaf Bradford    Hypercholesterolemia     Osteoporosis        PSH:  Past Surgical History:   Procedure Laterality Date    HX BREAST LUMPECTOMY         Allergies: Allergies   Allergen Reactions    Prilosec [Omeprazole] Other (comments)     Leg cramps    Zinc Swelling     Jewelry       Home Medications:  Prior to Admission Medications   Prescriptions Last Dose Informant Patient Reported? Taking? Biotin 2,500 mcg cap 2017 at Unknown time  Yes Yes   Sig: Take 5,000 mcg by mouth daily.    Denosumab (PROLIA) 60 mg/mL injection   Yes Yes Si mg by SubCUTAneous route. every 6 months   anastrozole (ARIMIDEX) 1 mg tablet 2017 at 1700  Yes Yes   Sig: Take 1 Tab by mouth daily. atorvastatin (LIPITOR) 10 mg tablet 2017 at PM  Yes Yes   Sig: Take 10 mg by mouth daily. ranitidine (ACID REDUCER) 75 mg tablet 2017 at Unknown time  Yes Yes   Sig: Take 75 mg by mouth daily.       Facility-Administered Medications: None       Hospital Medications:  Current Facility-Administered Medications   Medication Dose Route Frequency    potassium chloride 10 mEq in 50 ml IVPB  10 mEq IntraVENous Q1H    sodium chloride (NS) flush 5-10 mL  5-10 mL IntraVENous Q8H    sodium chloride (NS) flush 5-10 mL  5-10 mL IntraVENous PRN    lactated ringers infusion  125 mL/hr IntraVENous CONTINUOUS    HYDROmorphone (PF) (DILAUDID) injection 1 mg  1 mg IntraVENous Q3H PRN    ondansetron (ZOFRAN) injection 4 mg  4 mg IntraVENous Q4H PRN    famotidine (PF) (PEPCID) 20 mg in sodium chloride 0.9 % 10 mL injection  20 mg IntraVENous Q12H    prochlorperazine (COMPAZINE) with saline injection 10 mg  10 mg IntraVENous Q6H PRN       Social History:  Social History   Substance Use Topics    Smoking status: Never Smoker    Smokeless tobacco: Never Used    Alcohol use 3.5 oz/week     7 Glasses of wine per week       Family History:  Family History   Problem Relation Age of Onset    Osteoporosis Mother     Heart Failure Mother     Colon Cancer Father     Cancer Father      lung & colon    Cancer Sister      skin       Review of Systems:  Constitutional: negative fever, negative chills, negative weight loss  Eyes:   negative visual changes  ENT:   negative sore throat, tongue or lip swelling  Respiratory:  negative cough, negative dyspnea  Cards:  negative for chest pain, palpitations, lower extremity edema  GI:   See HPI  :  negative for frequency, dysuria  Integument:  negative for rash and pruritus  Heme:  negative for easy bruising and gum/nose bleeding  Musculoskel: negative for myalgias, back pain and muscle weakness  Neuro: negative for headaches, dizziness, vertigo  Psych:  negative for feelings of anxiety, depression     Objective:   Patient Vitals for the past 8 hrs:   BP Temp Pulse Resp SpO2   04/11/17 0728 109/62 98.1 °F (36.7 °C) 96 16 93 %   04/11/17 0430 122/67 98.1 °F (36.7 °C) 76 16 93 %     04/11 0701 - 04/11 1900  In: 795.8 [I.V.:795.8]  Out: -   04/09 1901 - 04/11 0700  In: 1077.1 [I.V.:1077.1]  Out: -     EXAM:     CONST:  Pleasant woman in bed in no acute distress   NEURO:  alert and oriented x 3   HEENT: EOMI, no scleral icterus   LUNGS: clear to ausculation, (-) wheeze   CARD:  regular rate and rhythm, S1 S2   ABD:  soft, no tenderness, no rebound, bowel sounds (+) all 4 quadrants, no masses, non distended   EXT:  no edema, warm   PSYCH: full, not anxious     Data Review     Recent Labs      04/11/17   0414  04/10/17   1123   WBC  13.3*  12.5*   HGB  10.3*  12.6   HCT  31.3*  38.1   PLT  206  239     Recent Labs      04/11/17   0414  04/10/17   1123   NA  142  139   K  3.3*  3.5   CL  108  106   CO2  24  25   BUN  12  18   CREA  0.65  0.86   GLU  94  156*   CA  7.6*  8.9     Recent Labs      04/11/17   0414  04/10/17   1123   SGOT   --   17   AP   --   57   TP   --   7.3   ALB   --   3.9   GLOB   --   3.4   LPSE  >3000*  >3000*     No results for input(s): INR, PTP, APTT in the last 72 hours. No lab exists for component: INREXT       Assessment:   · Unknown etiology acute pancreatitis: epigastric pain, lipase elevated. TG normal, ultrasound shows no gallstones     Patient Active Problem List   Diagnosis Code    [facility] C50.919    Hyperlipemia E78.5    Osteoporosis M81.0    Ocular migraine G43. 109    Diverticulosis of colon K57.30    Eczema L30.9    Advance directive on file Z78.9    Mixed hyperlipidemia E78.2    Pancreatitis K85.90     Plan:   · NPO  · IV fluids as infusing  · Pain/nausea medicine  · If LFTs rise, obtain MRCP/CT scan  · Supportive care  · Thank you for allowing me to participate in care of Siobhan Nazario     Signed By: Jelena Wilson. Jean Gonzáles NP     4/11/2017  11:40 AM       Patient seen and examined, agree with plans, she is much improved today. CT scan is consistent with pancreas divisum and this probably the etiology of the pancreatitis.     Mauro Hough MD

## 2017-04-11 NOTE — PROGRESS NOTES
Hospitalist Progress Note  Trista Montez NP  Office: [phone]  Cell: [phone]      Date of Service:  2017  NAME:  Carolina Abel  :  1945  MRN:  [id number]      Admission Summary:   71 yof with pmh of breast cancer, OA, and hypercholesterolemia who presented with abdominal pain with associated with nausea and vomiting. Patient found to be in acute pancreatitis with Lipase >3000 in ED. Interval history / Subjective:     Patient notes abd pain is much better today and denies any nausea or vomiting. Assessment & Plan:     Acute Pancreatitis   -lipase remains >3000  -continue IVF, pain mgmt  -Gi following- spoke with Dr Una Mckenna, pt first occurrence so no need for further w/u imaging at this point, if LFTs rise may need MRCP/CT scan  -CT abd showed pancreatitic divisim   -monitor labs     Leukocytosis   -? Reactive d/t stress response   -no obvious s/s of infection, hold on abx   -monitor    Code status: Full      Care Plan discussed with: Patient/Family and Nurse Dr Jessica Mitchell, Dr Una Mckenna  Disposition: Home w/Family and TBD     Hospital Problems  Date Reviewed: 4/10/2017          Codes Class Noted POA    * (Principal)Pancreatitis ICD-10-CM: K85.90  ICD-9-CM: 567.9  4/10/2017 Unknown                Review of Systems:   A comprehensive review of systems was negative except for that written in the HPI. Vital Signs:    Last 24hrs VS reviewed since prior progress note.  Most recent are:  Visit Vitals    /62 (BP 1 Location: Left arm, BP Patient Position: At rest)    Pulse 96    Temp 98.1 °F (36.7 °C)    Resp 16    Ht 5' 3\" (1.6 m)    Wt 57.8 kg (127 lb 6.4 oz)    SpO2 93%    BMI 22.57 kg/m2         Intake/Output Summary (Last 24 hours) at 17 1424  Last data filed at 17 0811   Gross per 24 hour   Intake          1872.91 ml   Output                0 ml   Net          1872.91 ml        Physical Examination: Constitutional:  No acute distress, cooperative, pleasant    ENT:  Oral mucous moist, oropharynx benign. Neck supple,    Resp:  CTA bilaterally. No wheezing/rhonchi/rales. No accessory muscle use   CV:  Regular rhythm, normal rate, no murmurs, gallops, rubs    GI:  Soft, non distended, tender in RUQ. normoactive bowel sounds, no hepatosplenomegaly     Musculoskeletal:  No edema, warm, 2+ pulses throughout    Neurologic:  Moves all extremities. AAOx3, CN II-XII reviewed     Psych:  Good insight, Not anxious nor agitated. Data Review:    Review and/or order of clinical lab test      Labs:     Recent Labs      04/11/17   0414  04/10/17   1123   WBC  13.3*  12.5*   HGB  10.3*  12.6   HCT  31.3*  38.1   PLT  206  239     Recent Labs      04/11/17 0414  04/10/17   1123   NA  142  139   K  3.3*  3.5   CL  108  106   CO2  24  25   BUN  12  18   CREA  0.65  0.86   GLU  94  156*   CA  7.6*  8.9   MG  1.9  2.2     Recent Labs      04/11/17 0414  04/10/17   1123   SGOT   --   17   ALT   --   27   AP   --   57   TBILI   --   0.4   TP   --   7.3   ALB   --   3.9   GLOB   --   3.4   LPSE  >3000*  >3000*     No results for input(s): INR, PTP, APTT in the last 72 hours. No lab exists for component: INREXT   No results for input(s): FE, TIBC, PSAT, FERR in the last 72 hours. No results found for: FOL, RBCF   No results for input(s): PH, PCO2, PO2 in the last 72 hours. No results for input(s): CPK, CKNDX, TROIQ in the last 72 hours.     No lab exists for component: CPKMB  Lab Results   Component Value Date/Time    Cholesterol, total 181 04/10/2017 11:23 AM    HDL Cholesterol 91 04/10/2017 11:23 AM    LDL, calculated 79 04/10/2017 11:23 AM    Triglyceride 55 04/10/2017 11:23 AM    CHOL/HDL Ratio 2.0 04/10/2017 11:23 AM     No results found for: [facility]  Lab Results   Component Value Date/Time    Color YELLOW/STRAW 04/10/2017 02:56 PM    Appearance CLEAR 04/10/2017 02:56 PM    Specific gravity 1.015 04/10/2017 02:56 PM    pH (UA) 7.0 04/10/2017 02:56 PM    Protein NEGATIVE  04/10/2017 02:56 PM    Glucose NEGATIVE  04/10/2017 02:56 PM    Ketone 15 04/10/2017 02:56 PM    Bilirubin NEGATIVE  04/10/2017 02:56 PM    Urobilinogen 0.2 04/10/2017 02:56 PM    Nitrites NEGATIVE  04/10/2017 02:56 PM    Leukocyte Esterase TRACE 04/10/2017 02:56 PM    Epithelial cells FEW 04/10/2017 02:56 PM    Bacteria NEGATIVE  04/10/2017 02:56 PM    WBC 0-4 04/10/2017 02:56 PM    RBC 0-5 04/10/2017 02:56 PM         Medications Reviewed:     Current Facility-Administered Medications   Medication Dose Route Frequency    sodium chloride (NS) flush 5-10 mL  5-10 mL IntraVENous Q8H    sodium chloride (NS) flush 5-10 mL  5-10 mL IntraVENous PRN    lactated ringers infusion  125 mL/hr IntraVENous CONTINUOUS    HYDROmorphone (PF) (DILAUDID) injection 1 mg  1 mg IntraVENous Q3H PRN    ondansetron (ZOFRAN) injection 4 mg  4 mg IntraVENous Q4H PRN    famotidine (PF) (PEPCID) 20 mg in sodium chloride 0.9 % 10 mL injection  20 mg IntraVENous Q12H    prochlorperazine (COMPAZINE) with saline injection 10 mg  10 mg IntraVENous Q6H PRN     ______________________________________________________________________  EXPECTED LENGTH OF STAY: - - -  ACTUAL LENGTH OF STAY:          0                 Paul Torres NP

## 2017-04-11 NOTE — PROGRESS NOTES
Problem: Patient Education: Go to Patient Education Activity  Goal: Patient/Family Education  Outcome: Progressing Towards Goal  Patient educated on on pain associated with pancreatitis and the risk for falls while taking Dilaudid. Patient was asked to ring out for assistance if needing to get out of bed due to medication. Comments:   Javan bell in reach pain controlled and falls risk gone over with patient and family.

## 2017-04-11 NOTE — PROGRESS NOTES
Patient arrived from ED she is AOX4, VSS. She is very nauseated and she has pain 7/10. Will continue to monitor patient for changes in her condition. 2016 patient's pain is 7/10 and she is very nauseated and vomiting pain medication given and getting order for something else for nausea since Zofran doesn't seem to be working and is not due anytime soon. 2025 NP has put in order for compazine. 2107 compazine given. 2330 patient is resting in bed she denies having any pain or nausea at this. Will continue to monitor patient for changes in her condition. 0430 patient is resting in bed she has abdominal pain 6/10 pain medication to be given. Blood drawn and sent to lab.    0730 Bedside and Verbal shift change report given to Kat Vaughn RN  (oncoming nurse) by Renea Burnette RN  (offgoing nurse). Report included the following information SBAR, MAR, Recent Results and Med Rec Status.

## 2017-04-11 NOTE — PROGRESS NOTES
Problem: Pancreatitis  Goal: *Optimize nutritional status  Outcome: Progressing Towards Goal  Pt NPO at this time, IV fluids infusing

## 2017-04-12 LAB
ALBUMIN SERPL BCP-MCNC: 2.9 G/DL (ref 3.5–5)
ALBUMIN/GLOB SERPL: 1.1 {RATIO} (ref 1.1–2.2)
ALP SERPL-CCNC: 49 U/L (ref 45–117)
ALT SERPL-CCNC: 19 U/L (ref 12–78)
ANION GAP BLD CALC-SCNC: 8 MMOL/L (ref 5–15)
AST SERPL W P-5'-P-CCNC: 13 U/L (ref 15–37)
BACTERIA SPEC CULT: NORMAL
BASOPHILS # BLD AUTO: 0 K/UL (ref 0–0.1)
BASOPHILS # BLD: 0 % (ref 0–1)
BILIRUB SERPL-MCNC: 0.3 MG/DL (ref 0.2–1)
BUN SERPL-MCNC: 9 MG/DL (ref 6–20)
BUN/CREAT SERPL: 15 (ref 12–20)
CALCIUM SERPL-MCNC: 8.1 MG/DL (ref 8.5–10.1)
CC UR VC: NORMAL
CHLORIDE SERPL-SCNC: 106 MMOL/L (ref 97–108)
CO2 SERPL-SCNC: 26 MMOL/L (ref 21–32)
CREAT SERPL-MCNC: 0.61 MG/DL (ref 0.55–1.02)
EOSINOPHIL # BLD: 0.1 K/UL (ref 0–0.4)
EOSINOPHIL NFR BLD: 1 % (ref 0–7)
ERYTHROCYTE [DISTWIDTH] IN BLOOD BY AUTOMATED COUNT: 13.5 % (ref 11.5–14.5)
GLOBULIN SER CALC-MCNC: 2.7 G/DL (ref 2–4)
GLUCOSE SERPL-MCNC: 81 MG/DL (ref 65–100)
HCT VFR BLD AUTO: 30 % (ref 35–47)
HGB BLD-MCNC: 9.9 G/DL (ref 11.5–16)
LIPASE SERPL-CCNC: >3000 U/L (ref 73–393)
LYMPHOCYTES # BLD AUTO: 13 % (ref 12–49)
LYMPHOCYTES # BLD: 1.4 K/UL (ref 0.8–3.5)
MAGNESIUM SERPL-MCNC: 2.3 MG/DL (ref 1.6–2.4)
MCH RBC QN AUTO: 30.7 PG (ref 26–34)
MCHC RBC AUTO-ENTMCNC: 33 G/DL (ref 30–36.5)
MCV RBC AUTO: 93.2 FL (ref 80–99)
MONOCYTES # BLD: 1.2 K/UL (ref 0–1)
MONOCYTES NFR BLD AUTO: 11 % (ref 5–13)
NEUTS SEG # BLD: 7.8 K/UL (ref 1.8–8)
NEUTS SEG NFR BLD AUTO: 75 % (ref 32–75)
PHOSPHATE SERPL-MCNC: 1.9 MG/DL (ref 2.6–4.7)
PLATELET # BLD AUTO: 186 K/UL (ref 150–400)
POTASSIUM SERPL-SCNC: 3.6 MMOL/L (ref 3.5–5.1)
PROT SERPL-MCNC: 5.6 G/DL (ref 6.4–8.2)
RBC # BLD AUTO: 3.22 M/UL (ref 3.8–5.2)
SERVICE CMNT-IMP: NORMAL
SODIUM SERPL-SCNC: 140 MMOL/L (ref 136–145)
WBC # BLD AUTO: 10.4 K/UL (ref 3.6–11)

## 2017-04-12 PROCEDURE — 85025 COMPLETE CBC W/AUTO DIFF WBC: CPT | Performed by: NURSE PRACTITIONER

## 2017-04-12 PROCEDURE — 36415 COLL VENOUS BLD VENIPUNCTURE: CPT | Performed by: NURSE PRACTITIONER

## 2017-04-12 PROCEDURE — 80053 COMPREHEN METABOLIC PANEL: CPT | Performed by: NURSE PRACTITIONER

## 2017-04-12 PROCEDURE — 99218 HC RM OBSERVATION: CPT

## 2017-04-12 PROCEDURE — 83690 ASSAY OF LIPASE: CPT | Performed by: NURSE PRACTITIONER

## 2017-04-12 PROCEDURE — 83735 ASSAY OF MAGNESIUM: CPT | Performed by: NURSE PRACTITIONER

## 2017-04-12 PROCEDURE — 74011000250 HC RX REV CODE- 250: Performed by: NURSE PRACTITIONER

## 2017-04-12 PROCEDURE — 65660000000 HC RM CCU STEPDOWN

## 2017-04-12 PROCEDURE — 74011250636 HC RX REV CODE- 250/636: Performed by: FAMILY MEDICINE

## 2017-04-12 PROCEDURE — 74011250636 HC RX REV CODE- 250/636: Performed by: NURSE PRACTITIONER

## 2017-04-12 PROCEDURE — 74011000250 HC RX REV CODE- 250: Performed by: FAMILY MEDICINE

## 2017-04-12 PROCEDURE — 84100 ASSAY OF PHOSPHORUS: CPT | Performed by: NURSE PRACTITIONER

## 2017-04-12 RX ORDER — SODIUM CHLORIDE 0.9 % (FLUSH) 0.9 %
10 SYRINGE (ML) INJECTION EVERY 24 HOURS
Status: DISCONTINUED | OUTPATIENT
Start: 2017-04-13 | End: 2017-04-14 | Stop reason: HOSPADM

## 2017-04-12 RX ADMIN — FAMOTIDINE 20 MG: 10 INJECTION, SOLUTION INTRAVENOUS at 22:26

## 2017-04-12 RX ADMIN — SODIUM CHLORIDE, SODIUM LACTATE, POTASSIUM CHLORIDE, AND CALCIUM CHLORIDE 125 ML/HR: 600; 310; 30; 20 INJECTION, SOLUTION INTRAVENOUS at 08:46

## 2017-04-12 RX ADMIN — POTASSIUM PHOSPHATE, MONOBASIC AND POTASSIUM PHOSPHATE, DIBASIC: 224; 236 INJECTION, SOLUTION INTRAVENOUS at 08:46

## 2017-04-12 RX ADMIN — SODIUM CHLORIDE, SODIUM LACTATE, POTASSIUM CHLORIDE, AND CALCIUM CHLORIDE 125 ML/HR: 600; 310; 30; 20 INJECTION, SOLUTION INTRAVENOUS at 01:10

## 2017-04-12 RX ADMIN — ONDANSETRON 4 MG: 2 INJECTION INTRAMUSCULAR; INTRAVENOUS at 01:05

## 2017-04-12 RX ADMIN — FAMOTIDINE 20 MG: 10 INJECTION, SOLUTION INTRAVENOUS at 08:46

## 2017-04-12 RX ADMIN — HYDROMORPHONE HYDROCHLORIDE 1 MG: 1 INJECTION, SOLUTION INTRAMUSCULAR; INTRAVENOUS; SUBCUTANEOUS at 01:05

## 2017-04-12 RX ADMIN — SODIUM CHLORIDE, SODIUM LACTATE, POTASSIUM CHLORIDE, AND CALCIUM CHLORIDE 125 ML/HR: 600; 310; 30; 20 INJECTION, SOLUTION INTRAVENOUS at 20:18

## 2017-04-12 NOTE — PROGRESS NOTES
Hospitalist Progress Note  Hernan Ogden NP  Office: [phone]  Cell: [phone]      Date of Service:  2017  NAME:  Ibeth Venegas  :  1945  MRN:  [id number]      Admission Summary:   71 yof with pmh of breast cancer, OA, and hypercholesterolemia who presented with abdominal pain with associated with nausea and vomiting. Patient found to be in acute pancreatitis with Lipase >3000 in ED. Interval history / Subjective:    Patient notes abdominal pain is still there but not as bas as it was. Denies any nausea, vomiting or shortness of breath. Assessment & Plan:     Acute Pancreatitis   -lipase remains >3000  -continue IVF, pain mgmt  -Gi following-  pt first occurrence so no need for further w/u imaging at this point, if LFTs rise may need MRCP/CT scan  -CT abd showed possible pancreatitic divisim Mild peripancreatic inflammatory changes, compatible with acute pancreatitis. Small pelvic free fluid. Small pericholecystic fluid. Small bilateral pleural effusions with bilateral dependent atelectasis. -monitor labs   -advanced to Full liquid    Leukocytosis-resolved    -?  Reactive d/t stress response   -no obvious s/s of infection, hold on abx   -monitor    Acute hypoxic respiratory failure  -requiring 3 liters of oxygen via nasal cannula currently   - 4/10 cxr negative  -  CT abd/pelvis showed small bilateral pleural effusions with dependent atelectasis   -lungs currently clear  -suspect related to pain, encourage ambulation and IS    Hypokalemia   -replenished, now wnl  -montior    Code status: Full    Care Plan discussed with: Patient/Family and Nurse Dr Ector Smart  Disposition: Home w/Family and TBD     Hospital Problems  Date Reviewed: 4/10/2017          Codes Class Noted POA    * (Principal)Pancreatitis ICD-10-CM: K85.90  ICD-9-CM: 888.7  4/10/2017 Unknown                Review of Systems:   A comprehensive review of systems was negative except for that written in the HPI. Vital Signs:    Last 24hrs VS reviewed since prior progress note. Most recent are:  Visit Vitals    /70 (BP 1 Location: Right arm, BP Patient Position: At rest)    Pulse 91    Temp 99 °F (37.2 °C)    Resp 18    Ht 5' 3\" (1.6 m)    Wt 57.8 kg (127 lb 6.4 oz)    SpO2 95%    BMI 22.57 kg/m2       No intake or output data in the 24 hours ending 04/12/17 0844     Physical Examination:             Constitutional:  No acute distress, cooperative, pleasant    ENT:  Oral mucous moist, oropharynx benign. Neck supple,    Resp:  CTA bilaterally. No wheezing/rhonchi/rales. No accessory muscle use   CV:  Regular rhythm, normal rate, no murmurs, gallops, rubs    GI:  Soft, non distended, nontender. normoactive bowel sounds, no hepatosplenomegaly     Musculoskeletal:  No edema, warm, 2+ pulses throughout    Neurologic:  Moves all extremities. AAOx3, CN II-XII reviewed     Psych:  Good insight, Not anxious nor agitated. Data Review:    Review and/or order of clinical lab test      Labs:     Recent Labs      04/12/17 0444 04/11/17 0414   WBC  10.4  13.3*   HGB  9.9*  10.3*   HCT  30.0*  31.3*   PLT  186  206     Recent Labs      04/12/17   0444  04/11/17   0414  04/10/17   1123   NA  140  142  139   K  3.6  3.3*  3.5   CL  106  108  106   CO2  26  24  25   BUN  9  12  18   CREA  0.61  0.65  0.86   GLU  81  94  156*   CA  8.1*  7.6*  8.9   MG  2.3  1.9  2.2   PHOS  1.9*   --    --      Recent Labs      04/12/17 0444 04/11/17   0414  04/10/17   1123   SGOT  13*   --   17   ALT  19   --   27   AP  49   --   57   TBILI  0.3   --   0.4   TP  5.6*   --   7.3   ALB  2.9*   --   3.9   GLOB  2.7   --   3.4   LPSE  >3000*  >3000*  >3000*     No results for input(s): INR, PTP, APTT in the last 72 hours. No lab exists for component: INREXT, INREXT   No results for input(s): FE, TIBC, PSAT, FERR in the last 72 hours.    No results found for: FOL, RBCF   No results for input(s): PH, PCO2, PO2 in the last 72 hours. No results for input(s): CPK, CKNDX, TROIQ in the last 72 hours.     No lab exists for component: CPKMB  Lab Results   Component Value Date/Time    Cholesterol, total 181 04/10/2017 11:23 AM    HDL Cholesterol 91 04/10/2017 11:23 AM    LDL, calculated 79 04/10/2017 11:23 AM    Triglyceride 55 04/10/2017 11:23 AM    CHOL/HDL Ratio 2.0 04/10/2017 11:23 AM     No results found for: [facility]  Lab Results   Component Value Date/Time    Color YELLOW/STRAW 04/10/2017 02:56 PM    Appearance CLEAR 04/10/2017 02:56 PM    Specific gravity 1.015 04/10/2017 02:56 PM    pH (UA) 7.0 04/10/2017 02:56 PM    Protein NEGATIVE  04/10/2017 02:56 PM    Glucose NEGATIVE  04/10/2017 02:56 PM    Ketone 15 04/10/2017 02:56 PM    Bilirubin NEGATIVE  04/10/2017 02:56 PM    Urobilinogen 0.2 04/10/2017 02:56 PM    Nitrites NEGATIVE  04/10/2017 02:56 PM    Leukocyte Esterase TRACE 04/10/2017 02:56 PM    Epithelial cells FEW 04/10/2017 02:56 PM    Bacteria NEGATIVE  04/10/2017 02:56 PM    WBC 0-4 04/10/2017 02:56 PM    RBC 0-5 04/10/2017 02:56 PM         Medications Reviewed:     Current Facility-Administered Medications   Medication Dose Route Frequency    potassium phosphate 15 mmol in 0.9% sodium chloride 250 mL infusion   IntraVENous ONCE    [START ON 4/13/2017] sodium chloride (NS) flush 10 mL  10 mL IntraVENous Q24H    albuterol-ipratropium (DUO-NEB) 2.5 MG-0.5 MG/3 ML  3 mL Nebulization Q4H PRN    sodium chloride (NS) flush 5-10 mL  5-10 mL IntraVENous PRN    lactated ringers infusion  125 mL/hr IntraVENous CONTINUOUS    HYDROmorphone (PF) (DILAUDID) injection 1 mg  1 mg IntraVENous Q3H PRN    ondansetron (ZOFRAN) injection 4 mg  4 mg IntraVENous Q4H PRN    famotidine (PF) (PEPCID) 20 mg in sodium chloride 0.9 % 10 mL injection  20 mg IntraVENous Q12H    prochlorperazine (COMPAZINE) with saline injection 10 mg  10 mg IntraVENous Q6H PRN ______________________________________________________________________  EXPECTED LENGTH OF STAY: - - -  ACTUAL LENGTH OF STAY:          Travis Alves NP

## 2017-04-12 NOTE — PROGRESS NOTES
Patient resting in bed daughter at bedside. 0000 patient is resting in bed     0430 patient is resting in bed blood drawn and sent to lab.    0600 patient is sleeping resting comfortable in bed.    0730 Bedside and Verbal shift change report given to Maggie Hilliard RN  (oncoming nurse) by Les Salas RN  (offgoing nurse). Report included the following information SBAR, MAR, Recent Results and Med Rec Status.

## 2017-04-12 NOTE — PROGRESS NOTES
[address]  [address], [address] [facility] [place]     Assessment:  1. Pancreatitis  2. Pancreas Divisum    Plan:  1. Monitor lipase  2. Slowly refeed     Yun Daniels is a  71 y.o.  female who we are following for pancreatitis, probably secondary to pancreatic divisum. Clinically she is much improve, tolerating full liquids. Her lipase is still very elevated.       Past Medical History:   Diagnosis Date    breast cancer May 2010    R breast, stage IA; T1b,pN0, M0; Dr. Ana M Mcneill    Hypercholesterolemia     Osteoporosis      Past Surgical History:   Procedure Laterality Date    HX BREAST LUMPECTOMY       Allergies   Allergen Reactions    Prilosec [Omeprazole] Other (comments)     Leg cramps    Zinc Swelling     Jewelry     Current Facility-Administered Medications   Medication Dose Route Frequency Provider Last Rate Last Dose    [START ON 4/13/2017] sodium chloride (NS) flush 10 mL  10 mL IntraVENous Q24H River Clement NP        albuterol-ipratropium (DUO-NEB) 2.5 MG-0.5 MG/3 ML  3 mL Nebulization Q4H PRN Rashaun Holly NP        sodium chloride (NS) flush 5-10 mL  5-10 mL IntraVENous PRN Rosanne Jennings MD   10 mL at 04/10/17 2102    lactated ringers infusion  125 mL/hr IntraVENous CONTINUOUS Rosanne Jennings  mL/hr at 04/12/17 0846 125 mL/hr at 04/12/17 0846    HYDROmorphone (PF) (DILAUDID) injection 1 mg  1 mg IntraVENous Q3H PRN Rosanne Jennings MD   1 mg at 04/12/17 0105    ondansetron (ZOFRAN) injection 4 mg  4 mg IntraVENous Q4H PRN Rosanne Jennings MD   4 mg at 04/12/17 0105    famotidine (PF) (PEPCID) 20 mg in sodium chloride 0.9 % 10 mL injection  20 mg IntraVENous Q12H Rosanne Jennings MD   20 mg at 04/12/17 0846    prochlorperazine (COMPAZINE) with saline injection 10 mg  10 mg IntraVENous Q6H PRN Rashaun Holly NP   10 mg at 04/10/17 2107     ROS- No change from initial consultation    Visit Vitals    /70 (BP 1 Location: Right arm, BP Patient Position: At rest)    Pulse 91    Temp 99 °F (37.2 °C)    Resp 18    Ht 5' 3\" (1.6 m)    Wt 57.8 kg (127 lb 6.4 oz)    SpO2 95%    BMI 22.57 kg/m2           PHYSICAL EXAM:  General: WD, WN. Alert, cooperative, no acute distress    HEENT: NC, Atraumatic. PERRLA, EOMI. Anicteric sclerae. Lungs:  CTA Bilaterally. No Wheezing/Rhonchi/Rales. Heart:  Regular  rhythm,  No murmur (), No Rubs, No Gallops  Abdomen: Soft, Non distended, mildly tender, epigastrium.  +Bowel sounds, no HSM  Extremities: No c/c/e  Neurologic:  CN 2-12 gi, Alert and oriented X 3. No acute neurological distress   Psych:   Good insight. Not anxious nor agitated.       Nyla Christianson MD  4/12/2017  3:43 PM

## 2017-04-12 NOTE — CDMP QUERY
Dr. Daphnie Carcamo :    Please clarify if this patient is being treated/managed for:    =>Hypokalemia in setting of 4/11: K: 3.3 requiring KCL IV with resultant K: 3.6  =>Other Explanation of clinical findings  =>Unable to Determine (no explanation of clinical findings)    The medical record reflects the following clinical findings, treatment, and risk factors:    Risk Factors: 72 WF w/hx; Breast CA, osteoporosis, hypercholesterolemia  Clinical Indicators: Admitted with pancreatitis,  Pt with multiple episodes of vomiting per nursing notes w/ NPO diet. 4/11: K: 3.3  Treatment: KCL IV with resultant K: 3.6    Please clarify and document your clinical opinion in the progress notes and discharge summary including the definitive and/or presumptive diagnosis, (suspected or probable), related to the above clinical findings. Please include clinical findings supporting your diagnosis. Thank Homer [email or web address]  [phone]

## 2017-04-12 NOTE — PROGRESS NOTES
Problem: Patient Education: Go to Patient Education Activity  Goal: Patient/Family Education  Outcome: Progressing Towards Goal  Patient educated about fall risk and pancreatitis signs and symptoms    Comments:   Fall risk education about medications call bell in reach family at the bedside

## 2017-04-13 LAB
ALBUMIN SERPL BCP-MCNC: 3.1 G/DL (ref 3.5–5)
ALBUMIN/GLOB SERPL: 0.9 {RATIO} (ref 1.1–2.2)
ALP SERPL-CCNC: 53 U/L (ref 45–117)
ALT SERPL-CCNC: 21 U/L (ref 12–78)
ANION GAP BLD CALC-SCNC: 7 MMOL/L (ref 5–15)
AST SERPL W P-5'-P-CCNC: 22 U/L (ref 15–37)
BASOPHILS # BLD AUTO: 0 K/UL (ref 0–0.1)
BASOPHILS # BLD: 0 % (ref 0–1)
BILIRUB SERPL-MCNC: 0.6 MG/DL (ref 0.2–1)
BUN SERPL-MCNC: 6 MG/DL (ref 6–20)
BUN/CREAT SERPL: 9 (ref 12–20)
CALCIUM SERPL-MCNC: 8.6 MG/DL (ref 8.5–10.1)
CHLORIDE SERPL-SCNC: 108 MMOL/L (ref 97–108)
CO2 SERPL-SCNC: 28 MMOL/L (ref 21–32)
CREAT SERPL-MCNC: 0.65 MG/DL (ref 0.55–1.02)
EOSINOPHIL # BLD: 0.2 K/UL (ref 0–0.4)
EOSINOPHIL NFR BLD: 2 % (ref 0–7)
ERYTHROCYTE [DISTWIDTH] IN BLOOD BY AUTOMATED COUNT: 13.2 % (ref 11.5–14.5)
GLOBULIN SER CALC-MCNC: 3.4 G/DL (ref 2–4)
GLUCOSE SERPL-MCNC: 80 MG/DL (ref 65–100)
HCT VFR BLD AUTO: 32.7 % (ref 35–47)
HGB BLD-MCNC: 10.7 G/DL (ref 11.5–16)
LIPASE SERPL-CCNC: >3000 U/L (ref 73–393)
LYMPHOCYTES # BLD AUTO: 16 % (ref 12–49)
LYMPHOCYTES # BLD: 1.4 K/UL (ref 0.8–3.5)
MCH RBC QN AUTO: 30.1 PG (ref 26–34)
MCHC RBC AUTO-ENTMCNC: 32.7 G/DL (ref 30–36.5)
MCV RBC AUTO: 92.1 FL (ref 80–99)
MONOCYTES # BLD: 1.1 K/UL (ref 0–1)
MONOCYTES NFR BLD AUTO: 12 % (ref 5–13)
NEUTS SEG # BLD: 5.9 K/UL (ref 1.8–8)
NEUTS SEG NFR BLD AUTO: 70 % (ref 32–75)
PLATELET # BLD AUTO: 208 K/UL (ref 150–400)
POTASSIUM SERPL-SCNC: 3.4 MMOL/L (ref 3.5–5.1)
PROT SERPL-MCNC: 6.5 G/DL (ref 6.4–8.2)
RBC # BLD AUTO: 3.55 M/UL (ref 3.8–5.2)
SODIUM SERPL-SCNC: 143 MMOL/L (ref 136–145)
WBC # BLD AUTO: 8.6 K/UL (ref 3.6–11)

## 2017-04-13 PROCEDURE — 36415 COLL VENOUS BLD VENIPUNCTURE: CPT | Performed by: NURSE PRACTITIONER

## 2017-04-13 PROCEDURE — 85025 COMPLETE CBC W/AUTO DIFF WBC: CPT | Performed by: NURSE PRACTITIONER

## 2017-04-13 PROCEDURE — 74011250636 HC RX REV CODE- 250/636: Performed by: FAMILY MEDICINE

## 2017-04-13 PROCEDURE — 65660000000 HC RM CCU STEPDOWN

## 2017-04-13 PROCEDURE — 80053 COMPREHEN METABOLIC PANEL: CPT | Performed by: NURSE PRACTITIONER

## 2017-04-13 PROCEDURE — 74011250637 HC RX REV CODE- 250/637: Performed by: NURSE PRACTITIONER

## 2017-04-13 PROCEDURE — 83690 ASSAY OF LIPASE: CPT | Performed by: NURSE PRACTITIONER

## 2017-04-13 PROCEDURE — 74011000250 HC RX REV CODE- 250: Performed by: FAMILY MEDICINE

## 2017-04-13 RX ORDER — POTASSIUM CHLORIDE 750 MG/1
40 TABLET, FILM COATED, EXTENDED RELEASE ORAL
Status: COMPLETED | OUTPATIENT
Start: 2017-04-13 | End: 2017-04-13

## 2017-04-13 RX ADMIN — SODIUM CHLORIDE, SODIUM LACTATE, POTASSIUM CHLORIDE, AND CALCIUM CHLORIDE 125 ML/HR: 600; 310; 30; 20 INJECTION, SOLUTION INTRAVENOUS at 04:35

## 2017-04-13 RX ADMIN — FAMOTIDINE 20 MG: 10 INJECTION, SOLUTION INTRAVENOUS at 21:55

## 2017-04-13 RX ADMIN — SODIUM CHLORIDE, SODIUM LACTATE, POTASSIUM CHLORIDE, AND CALCIUM CHLORIDE 125 ML/HR: 600; 310; 30; 20 INJECTION, SOLUTION INTRAVENOUS at 21:55

## 2017-04-13 RX ADMIN — POTASSIUM CHLORIDE 40 MEQ: 750 TABLET, FILM COATED, EXTENDED RELEASE ORAL at 10:52

## 2017-04-13 RX ADMIN — Medication 10 ML: at 21:55

## 2017-04-13 RX ADMIN — FAMOTIDINE 20 MG: 10 INJECTION, SOLUTION INTRAVENOUS at 10:52

## 2017-04-13 NOTE — PROGRESS NOTES
[address]  [address], [address]       GI PROGRESS NOTE  Krystle Car, [facility]  [phone] office  [phone] NP in-hospital cell phone M-F until 4:30  After 5pm or on weekends, please call  for physician on call      NAME: Margarito Simons   :  1945   MRN:  [id number]       Subjective:     Pt without complaint. She occasionally has a \"twinge of cramping\" along epigastrium/LUQ but minor. Objective:     VITALS:   Last 24hrs VS reviewed since prior progress note. Most recent are:  Visit Vitals    /70 (BP 1 Location: Right arm, BP Patient Position: At rest)    Pulse (!) 107    Temp 98.5 °F (36.9 °C)    Resp 18    Ht 5' 3\" (1.6 m)    Wt 57.8 kg (127 lb 6.4 oz)    SpO2 92%    BMI 22.57 kg/m2       PHYSICAL EXAM:  General: Cooperative, no acute distress    Neurologic:  Alert and oriented X 3. HEENT: PERRL, EOMI. Lungs:  CTA bilaterally. No wheezing  Heart:  S1 S2, regular rhythm, no murmur   Abdomen: Soft, non distended, non tender. +Bowel sounds  Extremities: No edema  Psych:   Good insight. Not anxious or agitated. Lab Data Reviewed:     Recent Results (from the past 24 hour(s))   CBC WITH AUTOMATED DIFF    Collection Time: 17  3:25 AM   Result Value Ref Range    WBC 8.6 3.6 - 11.0 K/uL    RBC 3.55 (L) 3.80 - 5.20 M/uL    HGB 10.7 (L) 11.5 - 16.0 g/dL    HCT 32.7 (L) 35.0 - 47.0 %    MCV 92.1 80.0 - 99.0 FL    MCH 30.1 26.0 - 34.0 PG    MCHC 32.7 30.0 - 36.5 g/dL    RDW 13.2 11.5 - 14.5 %    PLATELET 870 576 - 178 K/uL    NEUTROPHILS 70 32 - 75 %    LYMPHOCYTES 16 12 - 49 %    MONOCYTES 12 5 - 13 %    EOSINOPHILS 2 0 - 7 %    BASOPHILS 0 0 - 1 %    ABS. NEUTROPHILS 5.9 1.8 - 8.0 K/UL    ABS. LYMPHOCYTES 1.4 0.8 - 3.5 K/UL    ABS. MONOCYTES 1.1 (H) 0.0 - 1.0 K/UL    ABS. EOSINOPHILS 0.2 0.0 - 0.4 K/UL    ABS.  BASOPHILS 0.0 0.0 - 0.1 K/UL   METABOLIC PANEL, COMPREHENSIVE    Collection Time: 17  3:25 AM   Result Value Ref Range Sodium 143 136 - 145 mmol/L    Potassium 3.4 (L) 3.5 - 5.1 mmol/L    Chloride 108 97 - 108 mmol/L    CO2 28 21 - 32 mmol/L    Anion gap 7 5 - 15 mmol/L    Glucose 80 65 - 100 mg/dL    BUN 6 6 - 20 MG/DL    Creatinine 0.65 0.55 - 1.02 MG/DL    BUN/Creatinine ratio 9 (L) 12 - 20      GFR est AA >60 >60 ml/min/1.73m2    GFR est non-AA >60 >60 ml/min/1.73m2    Calcium 8.6 8.5 - 10.1 MG/DL    Bilirubin, total 0.6 0.2 - 1.0 MG/DL    ALT (SGPT) 21 12 - 78 U/L    AST (SGOT) 22 15 - 37 U/L    Alk. phosphatase 53 45 - 117 U/L    Protein, total 6.5 6.4 - 8.2 g/dL    Albumin 3.1 (L) 3.5 - 5.0 g/dL    Globulin 3.4 2.0 - 4.0 g/dL    A-G Ratio 0.9 (L) 1.1 - 2.2     LIPASE    Collection Time: 04/13/17  3:25 AM   Result Value Ref Range    Lipase >3000 (H) 73 - 393 U/L          Assessment:   · Acute pancreatitis: lipase still >3000     Patient Active Problem List   Diagnosis Code    Breast cancer, stage 1 ([facility] C50.919    Hyperlipemia E78.5    Osteoporosis M81.0    Ocular migraine G43. 109    Diverticulosis of colon K57.30    Eczema L30.9    Advance directive on file Z78.9    Mixed hyperlipidemia E78.2    Pancreatitis K85.90     Plan:   · Advance diet today. · If remains pain free, likely discharge tomorrow  · Lipase in AM     Signed By: Chante West. Fabby Hartley NP     4/13/2017  11:28 AM                 Patient seen and examined, agree with plans, I agree if she does okay home tomorrow on lite diet.     Fantasma Wyatt MD

## 2017-04-13 NOTE — PROGRESS NOTES
Problem: Falls - Risk of  Goal: *Absence of falls  Outcome: Progressing Towards Goal  Bed in lowest position with wheels locked. Pt instructed to call for assistance with ambulation.

## 2017-04-13 NOTE — PROGRESS NOTES
Hospitalist Progress Note  Anyi Stanton NP  Office: [phone]  Cell: [phone]      Date of Service:  2017  NAME:  Benny Vazquez  :  1945  MRN:  [id number]      Admission Summary:   71 yof with pmh of breast cancer, OA, and hypercholesterolemia who presented with abdominal pain with associated with nausea and vomiting. Patient found to be in acute pancreatitis with Lipase >3000 in ED. Interval history / Subjective:    Patient feels better. 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 Small pelvic free fluid. Small pericholecystic fluid. Small bilateral pleural effusions with bilateral dependent atelectasis. -monitor labs   -advanced to Regular diet    Leukocytosis-resolved    -?  Reactive d/t stress response   -no obvious s/s of infection, hold on abx   -monitor    Acute hypoxic respiratory failure  Resolved   -requiring 3 liters of oxygen via nasal cannula currently   - 4/10 cxr negative  -  CT abd/pelvis showed small bilateral pleural effusions with dependent atelectasis   -lungs currently clear  -suspect related to pain, encourage ambulation and IS    Hypokalemia   -repleted today    Code status: Full    Care Plan discussed with: Patient/Family and Nurse Dr Cadence Avila  Disposition: Home w/Family and TBD hopefully this PM or tomorrow     Hospital Problems  Date Reviewed: 4/10/2017          Codes Class Noted POA    * (Principal)Pancreatitis ICD-10-CM: K85.90  ICD-9-CM: 718.7  4/10/2017 Unknown                Review of Systems:   A comprehensive review of systems was negative except for that written in the HPI. Vital Signs:    Last 24hrs VS reviewed since prior progress note. Most recent are:  Visit Vitals    /70 (BP 1 Location: Right arm, BP Patient Position: At rest)    Pulse (!) 107    Temp 98.5 °F (36.9 °C)    Resp 18    Ht 5' 3\" (1.6 m)    Wt 57.8 kg (127 lb 6.4 oz)    SpO2 92%    BMI 22.57 kg/m2         Intake/Output Summary (Last 24 hours) at 04/13/17 1103  Last data filed at 04/13/17 0735   Gross per 24 hour   Intake          2693.75 ml   Output                0 ml   Net          2693.75 ml        Physical Examination:             Constitutional:  No acute distress, cooperative, pleasant    ENT:  Oral mucous moist, oropharynx benign. Neck supple,    Resp:  CTA bilaterally. No wheezing/rhonchi/rales. No accessory muscle use   CV:  Regular rhythm, normal rate, no murmurs, gallops, rubs    GI:  Soft, non distended, nontender. normoactive bowel sounds, no hepatosplenomegaly     Musculoskeletal:  No edema, warm, 2+ pulses throughout    Neurologic:  Moves all extremities. AAOx3     Psych:  Good insight, Not anxious nor agitated.        Data Review:    Review and/or order of clinical lab test      Labs:     Recent Labs      04/13/17 0325 04/12/17 0444   WBC  8.6  10.4   HGB  10.7*  9.9*   HCT  32.7*  30.0*   PLT  208  186     Recent Labs      04/13/17   0325  04/12/17   0444  04/11/17   0414  04/10/17   1123   NA  143  140  142  139   K  3.4*  3.6  3.3*  3.5   CL  108  106  108  106   CO2  28  26  24  25   BUN  6  9  12  18   CREA  0.65  0.61  0.65  0.86   GLU  80  81  94  156*   CA  8.6  8.1*  7.6*  8.9   MG   --   2.3  1.9  2.2   PHOS   --   1.9*   --    --      Recent Labs      04/13/17 0325 04/12/17 0444 04/11/17 0414  04/10/17   1123   SGOT  22  13*   --   17   ALT  21  19   --   27   AP  53  49   --   57   TBILI  0.6  0.3   --   0.4   TP  6.5  5.6*   --   7.3   ALB  3.1*  2.9*   --   3.9   GLOB  3.4  2.7   --   3.4   LPSE  >3000*  >3000*  >3000*  >3000* No results for input(s): INR, PTP, APTT in the last 72 hours. No lab exists for component: INREXT, INREXT   No results for input(s): FE, TIBC, PSAT, FERR in the last 72 hours. No results found for: FOL, RBCF   No results for input(s): PH, PCO2, PO2 in the last 72 hours. No results for input(s): CPK, CKNDX, TROIQ in the last 72 hours.     No lab exists for component: CPKMB  Lab Results   Component Value Date/Time    Cholesterol, total 181 04/10/2017 11:23 AM    HDL Cholesterol 91 04/10/2017 11:23 AM    LDL, calculated 79 04/10/2017 11:23 AM    Triglyceride 55 04/10/2017 11:23 AM    CHOL/HDL Ratio 2.0 04/10/2017 11:23 AM     No results found for: [facility]  Lab Results   Component Value Date/Time    Color YELLOW/STRAW 04/10/2017 02:56 PM    Appearance CLEAR 04/10/2017 02:56 PM    Specific gravity 1.015 04/10/2017 02:56 PM    pH (UA) 7.0 04/10/2017 02:56 PM    Protein NEGATIVE  04/10/2017 02:56 PM    Glucose NEGATIVE  04/10/2017 02:56 PM    Ketone 15 04/10/2017 02:56 PM    Bilirubin NEGATIVE  04/10/2017 02:56 PM    Urobilinogen 0.2 04/10/2017 02:56 PM    Nitrites NEGATIVE  04/10/2017 02:56 PM    Leukocyte Esterase TRACE 04/10/2017 02:56 PM    Epithelial cells FEW 04/10/2017 02:56 PM    Bacteria NEGATIVE  04/10/2017 02:56 PM    WBC 0-4 04/10/2017 02:56 PM    RBC 0-5 04/10/2017 02:56 PM         Medications Reviewed:     Current Facility-Administered Medications   Medication Dose Route Frequency    sodium chloride (NS) flush 10 mL  10 mL IntraVENous Q24H    albuterol-ipratropium (DUO-NEB) 2.5 MG-0.5 MG/3 ML  3 mL Nebulization Q4H PRN    sodium chloride (NS) flush 5-10 mL  5-10 mL IntraVENous PRN    lactated ringers infusion  125 mL/hr IntraVENous CONTINUOUS    HYDROmorphone (PF) (DILAUDID) injection 1 mg  1 mg IntraVENous Q3H PRN    ondansetron (ZOFRAN) injection 4 mg  4 mg IntraVENous Q4H PRN    famotidine (PF) (PEPCID) 20 mg in sodium chloride 0.9 % 10 mL injection  20 mg IntraVENous Q12H    prochlorperazine (COMPAZINE) with saline injection 10 mg  10 mg IntraVENous Q6H PRN     ______________________________________________________________________  EXPECTED LENGTH OF STAY: 4d 21h  ACTUAL LENGTH OF STAY:          [address] [facility],

## 2017-04-14 VITALS
HEART RATE: 75 BPM | HEIGHT: 63 IN | BODY MASS INDEX: 22.57 KG/M2 | DIASTOLIC BLOOD PRESSURE: 62 MMHG | SYSTOLIC BLOOD PRESSURE: 150 MMHG | OXYGEN SATURATION: 95 % | TEMPERATURE: 98 F | RESPIRATION RATE: 16 BRPM | WEIGHT: 127.4 LBS

## 2017-04-14 LAB
ANION GAP BLD CALC-SCNC: 6 MMOL/L (ref 5–15)
BASOPHILS # BLD AUTO: 0 K/UL (ref 0–0.1)
BASOPHILS # BLD: 0 % (ref 0–1)
BUN SERPL-MCNC: 7 MG/DL (ref 6–20)
BUN/CREAT SERPL: 10 (ref 12–20)
CALCIUM SERPL-MCNC: 8.9 MG/DL (ref 8.5–10.1)
CHLORIDE SERPL-SCNC: 107 MMOL/L (ref 97–108)
CO2 SERPL-SCNC: 28 MMOL/L (ref 21–32)
CREAT SERPL-MCNC: 0.69 MG/DL (ref 0.55–1.02)
EOSINOPHIL # BLD: 0.3 K/UL (ref 0–0.4)
EOSINOPHIL NFR BLD: 4 % (ref 0–7)
ERYTHROCYTE [DISTWIDTH] IN BLOOD BY AUTOMATED COUNT: 12.9 % (ref 11.5–14.5)
GLUCOSE SERPL-MCNC: 90 MG/DL (ref 65–100)
HCT VFR BLD AUTO: 32.3 % (ref 35–47)
HGB BLD-MCNC: 10.8 G/DL (ref 11.5–16)
LIPASE SERPL-CCNC: 1004 U/L (ref 73–393)
LYMPHOCYTES # BLD AUTO: 20 % (ref 12–49)
LYMPHOCYTES # BLD: 1.6 K/UL (ref 0.8–3.5)
MCH RBC QN AUTO: 30.3 PG (ref 26–34)
MCHC RBC AUTO-ENTMCNC: 33.4 G/DL (ref 30–36.5)
MCV RBC AUTO: 90.5 FL (ref 80–99)
MONOCYTES # BLD: 1.2 K/UL (ref 0–1)
MONOCYTES NFR BLD AUTO: 15 % (ref 5–13)
NEUTS SEG # BLD: 4.9 K/UL (ref 1.8–8)
NEUTS SEG NFR BLD AUTO: 61 % (ref 32–75)
PLATELET # BLD AUTO: 195 K/UL (ref 150–400)
POTASSIUM SERPL-SCNC: 3.5 MMOL/L (ref 3.5–5.1)
RBC # BLD AUTO: 3.57 M/UL (ref 3.8–5.2)
SODIUM SERPL-SCNC: 141 MMOL/L (ref 136–145)
WBC # BLD AUTO: 8 K/UL (ref 3.6–11)

## 2017-04-14 PROCEDURE — 80048 BASIC METABOLIC PNL TOTAL CA: CPT | Performed by: NURSE PRACTITIONER

## 2017-04-14 PROCEDURE — 85025 COMPLETE CBC W/AUTO DIFF WBC: CPT | Performed by: NURSE PRACTITIONER

## 2017-04-14 PROCEDURE — 83690 ASSAY OF LIPASE: CPT | Performed by: NURSE PRACTITIONER

## 2017-04-14 PROCEDURE — 36415 COLL VENOUS BLD VENIPUNCTURE: CPT | Performed by: NURSE PRACTITIONER

## 2017-04-14 RX ADMIN — Medication 10 ML: at 07:03

## 2017-04-14 NOTE — DISCHARGE INSTRUCTIONS
Discharge Instructions       PATIENT ID: Jv Ambrose  MRN: [id number]   YOB: 1945    DATE OF ADMISSION: 4/10/2017 11:06 AM    DATE OF DISCHARGE: 4/14/2017    PRIMARY CARE PROVIDER: Carmen Arechiga MD       DISCHARGING PHYSICIAN: Elizabeth Damon MD    To contact this individual call [phone] and ask the  to page. If unavailable ask to be transferred the Adult Hospitalist Department. DISCHARGE DIAGNOSES Acute Pancreatitis    CONSULTATIONS: IP CONSULT TO HOSPITALIST  IP CONSULT TO GASTROENTEROLOGY    PROCEDURES/SURGERIES: * No surgery found *    PENDING TEST RESULTS:   At the time of discharge the following test results are still pending: na    FOLLOW UP APPOINTMENTS:   Follow-up Information     Follow up With Details Comments Contact Info    Carmen Arechiga MD Schedule an appointment as soon as possible for a visit in 1 week hospital fu as needed [address] [facility]  [address]      Ernestine Velazquez MD Schedule an appointment as soon as possible for a visit in 1 week hospital fu for pancreatitis [address] [address]  [phone]             ADDITIONAL CARE RECOMMENDATIONS: Follow up with Dr. Andre Gleason    DIET: pancreatitis diet,low fat, avoid alcohol    ACTIVITY: resume    WOUND CARE: na    EQUIPMENT needed: na      DISCHARGE MEDICATIONS:   See Medication Reconciliation Form    · It is important that you take the medication exactly as they are prescribed. · Keep your medication in the bottles provided by the pharmacist and keep a list of the medication names, dosages, and times to be taken in your wallet. · Do not take other medications without consulting your doctor. NOTIFY YOUR PHYSICIAN FOR ANY OF THE FOLLOWING:   Fever over 101 degrees for 24 hours. Chest pain, shortness of breath, fever, chills, nausea, vomiting, diarrhea, change in mentation, falling, weakness, bleeding. Severe pain or pain not relieved by medications.   Or, any other signs or symptoms that you may have questions about.       DISPOSITION:    Home With:   OT  PT  HH  RN       SNF/Inpatient Rehab/LTAC   x Independent/assisted living    Hospice    Other:     CDMP Checked:   Yes x       Signed:   Jonny Reagan MD  4/14/2017  11:22 AM

## 2017-04-14 NOTE — DISCHARGE SUMMARY
Discharge Summary       PATIENT ID: Oscar Villa  MRN: [id number]   YOB: 1945    DATE OF ADMISSION: 4/10/2017 11:06 AM    DATE OF DISCHARGE: 4/14/2017  PRIMARY CARE PROVIDER: Ramona Lara MD       DISCHARGING PHYSICIAN: Kylah Zamorano NP    To contact this individual call [phone] and ask the  to page. If unavailable ask to be transferred the Adult Hospitalist Department. CONSULTATIONS: IP CONSULT TO HOSPITALIST  IP CONSULT TO GASTROENTEROLOGY    PROCEDURES/SURGERIES: * No surgery found *    ADMITTING [address] [place] [address] COURSE:   The patient is a 21-year-old   female with past medical history significant for breast cancer,   osteoporosis and hypercholesterolemia, who presents to the hospital   complaining of the above-mentioned symptoms. The patient reports   she woke up at 6 this morning and had significant pain in her epigastric   region. The patient reports that she was at her baseline health until last   night. The patient started having some nausea, associated with   multiple bouts of vomiting and dry heaves. Reports that the pain got   unbearable, and she decided to come to the hospital. The patient   reports that she felt bloated and the pain was \"burning and achy. \" The   patient reports that the pain went down from her abdomen to her back,   and nothing helps relieve the pain. The patient reports that she had   some ginger ale and a small amount of toast this morning, but threw it   right up. The patient denies any other complaints or problems. Denies   any headache, blurry vision, sore throat, trouble swallowing, trouble   with speech, any chest pain, shortness of breath, cough, fever, chills,   diarrhea, urinary symptoms, focal or generalized neurological   weakness, recent travels or sick contacts.  The patient also denies   starting any new medications, any history of gallbladder disease or   gallbladder stones, any history of regular alcohol consumption or any history of pancreatitis in the past.  The patient was discharged in stable condition with outpatient follow up with Dr. Marita Angel in 7-10 days. She was advised to follow a low fate diet and avoid alcohol. DISCHARGE DIAGNOSES / PLAN:      Acute Pancreatitis   -lipase has finally trended down to 1000 range  -patient feels much better and tolerating regular diet  -discussed case with Dr. 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 Small pelvic free fluid. Small pericholecystic fluid. Small bilateral pleural effusions with bilateral dependent atelectasis. -outpatient fu     Leukocytosis-resolved    -?  Reactive d/t stress response   -no obvious s/s of infection, hold on abx   -monitor     Acute hypoxic respiratory failure  Resolved  -requiring 3 liters of oxygen via nasal cannula currently   - 4/10 cxr negative  - 4/11 CT abd/pelvis showed small bilateral pleural effusions with dependent atelectasis   -lungs currently clear  -suspect related to pain, encourage ambulation and IS     Hypokalemia   -resolved     Code status: Full          PENDING TEST RESULTS:   At the time of discharge the following test results are still pending: na    FOLLOW UP APPOINTMENTS:    Follow-up Information     Follow up With Details Comments Contact Info    Juana Chaudhary MD Schedule an appointment as soon as possible for a visit in 1 week hospital fu as needed [facility]  [address]      Fernando Mckenzie MD Schedule an appointment as soon as possible for a visit in 1 week hospital fu for pancreatitis [address] [facility] [address]  [phone]           ADDITIONAL CARE RECOMMENDATIONS: Follow up with Dr. Marita Angel    DIET: pancreatitis diet,low fat, avoid alcohol    ACTIVITY: resume    WOUND CARE: na    EQUIPMENT needed: na        DISCHARGE MEDICATIONS:  Current Discharge Medication List      CONTINUE these medications which have NOT CHANGED    Details   atorvastatin (LIPITOR) 10 mg tablet Take 10 mg by mouth daily. Biotin 2,500 mcg cap Take 5,000 mcg by mouth daily. ranitidine (ACID REDUCER) 75 mg tablet Take 75 mg by mouth daily. Denosumab (PROLIA) 60 mg/mL injection 60 mg by SubCUTAneous route. every 6 months      anastrozole (ARIMIDEX) 1 mg tablet Take 1 Tab by mouth daily. NOTIFY YOUR PHYSICIAN FOR ANY OF THE FOLLOWING:   Fever over 101 degrees for 24 hours. Chest pain, shortness of breath, fever, chills, nausea, vomiting, diarrhea, change in mentation, falling, weakness, bleeding. Severe pain or pain not relieved by medications. Or, any other signs or symptoms that you may have questions about. DISPOSITION:    Home With:   OT  PT  HH  RN       Long term SNF/Inpatient Rehab   x Independent/assisted living    Hospice    Other:       PATIENT CONDITION AT DISCHARGE:     Functional status    Poor     Deconditioned    x Independent      Cognition    x Lucid     Forgetful     Dementia      Catheters/lines (plus indication)    Rosenbaum     PICC     PEG    x None      Code status    x Full code     DNR      PHYSICAL EXAMINATION AT DISCHARGE:     Constitutional: No acute distress, cooperative, pleasant    ENT: Oral mucous moist, oropharynx benign. Neck supple,    Resp: CTA bilaterally. No wheezing/rhonchi/rales. No accessory muscle use   CV: Regular rhythm, normal rate, no murmurs, gallops, rubs   GI: Soft, non distended, nontender. normoactive bowel sounds, no hepatosplenomegaly    Musculoskeletal: No edema, warm, 2+ pulses throughout   Neurologic: Moves all extremities. AAOx3   Psych: Good insight, Not anxious nor agitated.   Visit Vitals    /62 (BP 1 Location: Right arm, BP Patient Position: At rest)    Pulse 75    Temp 98 °F (36.7 °C)    Resp 16    Ht 5' 3\" (1.6 m)    Wt 57.8 kg (127 lb 6.4 oz)    SpO2 95%    BMI 22.57 kg/m2 CHRONIC MEDICAL DIAGNOSES:  Problem List as of 4/14/2017  Date Reviewed: 4/14/2017          Codes Class Noted - Resolved    * (Principal)Pancreatitis ICD-10-CM: K85.90  ICD-9-CM: 743.1  4/10/2017 - Present        Mixed hyperlipidemia ICD-10-CM: E78.2  ICD-9-CM: 272.2  4/6/2016 - Present        Advance directive on file ICD-10-CM: Z78.9  ICD-9-CM: V49.89  12/7/2015 - Present    Overview Signed 12/7/2015 11:34 AM by Omer Shukla MD     12/7/15: She brought in a copy of her advanced directives. Diverticulosis of colon ICD-10-CM: K57.30  ICD-9-CM: 562.10  10/9/2014 - Present    Overview Signed 10/9/2014  9:00 AM by Robb العراقي MD     Hx of diverticulitis             Eczema ICD-10-CM: L30.9  ICD-9-CM: 692.9  10/9/2014 - Present        Ocular migraine ICD-10-CM: J54.501  ICD-9-CM: 346.80  10/3/2013 - Present        Hyperlipemia ICD-10-CM: E78.5  ICD-9-CM: 272.4  2/7/2011 - Present        Osteoporosis ICD-10-CM: M81.0  ICD-9-CM: 733.00  2/7/2011 - Present    Overview Signed 10/9/2014  8:59 AM by Robb العراقي MD     Managed by Dr Ty Baires. Breast [facility], stage 1 ([facility] ICD-10-CM: C50.919  ICD-9-CM: 174.9  8/23/2010 - Present    Overview Addendum 2/23/2016 12:41 PM by MD Dr. Ty Monsivais; Dxed 5/10 right breast ER/ND +  T1 N0 M0 stage 1, s/p R breast lumpectomy, axillary node dissection, and radiotherapy completed.  8/13/10                   Greater than 30 minutes were spent with the patient on counseling and coordination of care    Signed:   Rexene Sandhoff, NP  4/14/2017  8:27 AM

## 2017-04-14 NOTE — PROGRESS NOTES
Patient is resting in bed no complaints at this time. Will continue to monitor patient for changes in her condition. 2330 patient is resting in bed no complaints at this time. 0430 patient is resting in bed no complaints at this time.  Blood drawn and sent to lab

## 2017-04-14 NOTE — PROGRESS NOTES
Went over discharge instructions with pt. IV discontinued. PT had no questions. Spoke about diet. Pt ambulatory to discharge, no signs of distress.

## 2017-04-17 ENCOUNTER — PATIENT OUTREACH (OUTPATIENT)
Dept: INTERNAL MEDICINE CLINIC | Age: 72
End: 2017-04-17

## 2017-04-17 NOTE — PROGRESS NOTES
[facility] Discharge Follow-Up      Date/Time:  2017 10:38 AM    Patient listed on discharge [facility]) report on 17. RRAT score: Low Risk            8       Total Score        4 More than 1 Admission in calendar year    4 Charlson Comorbidity Score        Criteria that do not apply:    Relationship with PCP    Patient Living Status    Patient Length of Stay > 5    Patient Insurance is Medicare, Medicaid or Self Pay        Brief hospitalization history:  Patient was admitted to [facility] on 4/10/17 and discharged on 17 for acute pancreatitis. Patient went to ED for nausea, vomiting, and abdominal pain. 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 Small pelvic free fluid. Small pericholecystic fluid. Small bilateral pleural effusions with bilateral dependent atelectasis. Dr. Agus Dotson consulted and followed patient throughout hospitalization. Lipase was 1004 at discharge. Nurse Navigator(NN) contacted the patient by telephone to perform post hospital discharge assessment. Verified  and address with patient as identifiers. Provided introduction to self, and explanation of the Nurses Navigator role. The patient states that she is feeling better. She denies any nausea or vomiting. She has a \"twinge\" of pain after she eats, but that goes away and does not last long. She is eating a bland diet. She is going to call Dr. Yanelis Londono office to schedule follow up appointment today. CON appointment with Dr. Ella Delgado made. Patient given an opportunity to ask questions and they have no further questions or concerns at this time. No other clinical/social/functional needs noted. The patient agrees to contact the PCP office for questions related to their healthcare. NN provided contact information to the patient for future reference.  The patient expressed thanks, offered no additional questions and ended the call. Diet:   Patient reports: currently eating a bland diet    Activity:    Patient reports: drives, lives with , does not have any difficulties with mobility, gait, or performing ADL's. Medication:   Performed medication reconciliation with patient, and patient verbalizes understanding of administration of home medications. There were no barriers to obtaining medications identified at this time. Support system:  patient and     Discharge Instructions :  Reviewed discharge instructions with patient. Patient verbalizes understanding of discharge instructions and follow-up care. Red Flags:  Abdominal pain, nausea, vomiting, fever >100.4  Reviewed red flags with patient, and patient verbalizes understanding. Advance Care Planning: On file    PCP/Specialist follow up: Patient scheduled to follow up with Medina Regan MD on 4/19/17. Goals      Attends follow-up appointments as directed. Patient will attend appointments with Dr. Leroy Menendez and Dr. Chris Hopson Understands red flags post discharge.

## 2017-04-19 ENCOUNTER — OFFICE VISIT (OUTPATIENT)
Dept: INTERNAL MEDICINE CLINIC | Age: 72
End: 2017-04-19

## 2017-04-19 VITALS
RESPIRATION RATE: 16 BRPM | SYSTOLIC BLOOD PRESSURE: 145 MMHG | HEIGHT: 63 IN | TEMPERATURE: 97.8 F | HEART RATE: 62 BPM | BODY MASS INDEX: 21.79 KG/M2 | WEIGHT: 123 LBS | DIASTOLIC BLOOD PRESSURE: 74 MMHG | OXYGEN SATURATION: 97 %

## 2017-04-19 DIAGNOSIS — K85.00 IDIOPATHIC ACUTE PANCREATITIS WITHOUT INFECTION OR NECROSIS: Primary | ICD-10-CM

## 2017-04-19 NOTE — MR AVS SNAPSHOT
Visit Information Date & Time Provider Department Dept. Phone Encounter #  
 4/19/2017  3:00 PM Dong Devlin [phone] [id number] Follow-up Instructions Return if symptoms worsen or fail to improve, for Scheduled appointment 6/7/17. Your Appointments 6/7/2017  8:00 AM  
ROUTINE CARE with MD Dong Devlin [facility]) Appt Note: Complete Yearly Physical; Complete Yearly Physical; Complete Yearly Physical  
 [address] [facility] [id number] [phone]  
  
   
 [address] Upcoming Health Maintenance Date Due  
 GLAUCOMA SCREENING Q2Y 9/9/2016 Pneumococcal 65+ High/Highest Risk (2 of 2 - PPSV23) 9/21/2016 MEDICARE YEARLY EXAM 10/12/2016 COLONOSCOPY 4/30/2018 BREAST CANCER SCRN MAMMOGRAM 6/6/2018 DTaP/Tdap/Td series (3 - Td) 5/9/2026 Allergies as of 4/19/2017  Review Complete On: 4/19/2017 By: Leonor Stephens MD  
  
 Severity Noted Reaction Type Reactions Prilosec [Omeprazole]  02/07/2011    Other (comments) Leg cramps Zinc  07/17/2015    Swelling Jewelry Current Immunizations  Reviewed on 4/12/2017 Name Date Influenza High Dose Vaccine PF 10/12/2015, 10/9/2014, 10/3/2013 Pneumococcal Vaccine (Unspecified Type) 9/21/2011 TDAP Vaccine 6/6/2006 Td, Adsorbed PF 5/9/2016 Zoster 10/28/2011 Not reviewed this visit You Were Diagnosed With   
  
 Codes Comments Idiopathic acute pancreatitis without infection or necrosis    -  Primary ICD-10-CM: K85.00 ICD-9-CM: 078.2 Vitals BP Pulse Temp Resp Height(growth percentile) Weight(growth percentile) 145/74 62 97.8 °F (36.6 °C) (Oral) 16 5' 3\" (1.6 m) 123 lb (55.8 kg) SpO2 BMI OB Status Smoking Status 97% 21.79 kg/m2 Postmenopausal Never Smoker Vitals History BMI and BSA Data Body Mass Index Body Surface Area 21.79 kg/m 2 1.57 m 2 Preferred Pharmacy Pharmacy Name Phone [facility], [address] AT [address] [place] [address] Drive [phone] Your Updated Medication List  
  
   
This list is accurate as of: 4/19/17  3:58 PM.  Always use your most recent med list.  
  
  
  
  
 Acid Reducer 75 mg tablet Generic drug:  raNITIdine Take 75 mg by mouth daily. anastrozole 1 mg tablet Commonly known as:  ARIMIDEX Take 1 Tab by mouth daily. Biotin 2,500 mcg Cap Take 5,000 mcg by mouth daily. CRANBERRY CONCENTRATE PO Take 600 mg by mouth. LIPITOR 10 mg tablet Generic drug:  atorvastatin Take 10 mg by mouth daily. PROLIA 60 mg/mL injection Generic drug:  denosumab 60 mg by SubCUTAneous route. every 6 months We Performed the Following LIPASE [id number] CPT(R)] METABOLIC PANEL, COMPREHENSIVE [62555 CPT(R)] PHOSPHORUS [41842 CPT(R)] Follow-up Instructions Return if symptoms worsen or fail to improve, for Scheduled appointment 6/7/17. Patient Instructions Low-Fat Diet for Gallbladder Disease: Care Instructions Your Care Instructions When you eat, the gallbladder releases bile, which helps you digest the fat in food. If you have an inflamed gallbladder, this may cause pain. A low-fat diet may give your gallbladder a rest so you can start to heal. Your doctor and dietitian can help you make an eating plan that does not irritate your digestive system. Always talk with your doctor or dietitian before you make changes in your diet. Follow-up care is a key part of your treatment and safety. Be sure to make and go to all appointments, and call your doctor if you are having problems. It's also a good idea to know your test results and keep a list of the medicines you take. How can you care for yourself at home? · Eat many small meals and snacks each day instead of three large meals. · Choose lean meats. ¨ Eat no more than 5 to 6½ ounces of meat a day. ¨ Cut off all fat you can see. ¨ Eat chicken and turkey without the skin. ¨ Many types of fish, such as salmon, lake trout, tuna, and herring, provide healthy omega-3 fat. But, avoid fish canned in oil, such as sardines in olive oil. ¨ Bake, broil, or grill meats, poultry, or fish instead of frying them in butter or fat. · Drink or eat nonfat or low-fat milk, yogurt, cheese, or other milk products each day. ¨ Read the labels on cheeses, and choose those with less than 5 grams of fat an ounce. ¨ Try fat-free sour cream, cream cheese, or yogurt. ¨ Avoid cream soups and cream sauces on pasta. ¨ Eat low-fat ice cream, frozen yogurt, or sorbet. Avoid regular ice cream. 
· Eat whole-grain cereals, breads, crackers, rice, or pasta. Avoid high-fat foods such as croissants, scones, biscuits, waffles, doughnuts, muffins, granola, and high-fat breads. · Flavor your foods with herbs and spices (such as basil, tarragon, or mint), fat-free sauces, or lemon juice instead of butter. You can also use butter substitutes, fat-free mayonnaise, or fat-free dressing. · Try applesauce, prune puree, or mashed bananas to replace some or all of the fat when you bake. · Limit fats and oils, such as butter, margarine, mayonnaise, and salad dressing, to no more than 1 tablespoon a meal. 
· Avoid high-fat foods, such as: 
¨ Chocolate, whole milk, ice cream, and processed cheese. ¨ Fried or buttered foods. ¨ Sausage, salami, and brandon. ¨ Cinnamon rolls, cakes, pies, cookies, and other pastries. ¨ Prepared snack foods, such as potato chips, nut and granola bars, and mixed nuts. ¨ Coconut and avocado. · Learn how to read food labels for serving sizes and ingredients. Fast-food and convenience-food meals often have lots of fat. Where can you learn more? Go to http://scout-catalina.info/. Enter I734 in the search box to learn more about \"Low-Fat Diet for Gallbladder Disease: Care Instructions. \" Current as of: July 26, 2016 Content Version: 11.2 © 8965-8613 [employer]. Care instructions adapted under license by [employer] (which disclaims liability or warranty for this information). If you have questions about a medical condition or this instruction, always ask your healthcare professional. [address] any warranty or liability for your use of this information. Introducing [facility] & HEALTH SERVICES! Dear Rosalinda Grant: Thank you for requesting a Navmii account. Our records indicate that you already have an active Navmii account. You can access your account anytime at https://D and K interprises. GameSalad/D and K interprises Did you know that you can access your hospital and ER discharge instructions at any time in Navmii? You can also review all of your test results from your hospital stay or ER visit. Additional Information If you have questions, please visit the Frequently Asked Questions section of the Navmii website at [email or web address]://D and K interprises. GameSalad/D and K interprises/. Remember, Navmii is NOT to be used for urgent needs. For medical emergencies, dial 911. Now available from your iPhone and Android! Please provide this summary of care documentation to your next provider. Your primary care clinician is listed as Domonique Govea. If you have any questions after today's visit, please call [phone].

## 2017-04-19 NOTE — PROGRESS NOTES
Reviewed record  In preparation for visit and have obtained necessary documentation. 1. Have you been to the ER, urgent care clinic since your last visit? Hospitalized since your last visit?seen at [facility]   2. Have you seen or consulted any other health care providers outside of the [address] [place] [address] since your last visit? Include any pap smears or colon screening. No  Advanced Directives reviewed and verified and entered into flow sheet.

## 2017-04-19 NOTE — PROGRESS NOTES
CC:  Chief Complaint   Patient presents with   [facility] Follow Up     [address] [facility]  Lizeth Alvarado is a 71 y.o. female. Ms. Denilson Nicole is a 71y.o. year old female, she is seen today for Transition of Care services following a hospital discharge for acute pancreatitis on 4/14/17. Our office Nurse Navigator performed an outreach to Ms. Xavier Garcia on 4/17/17 (within 2 business days of discharge) to complete medication reconciliation and a telephonic assessment of her condition. She has a history of breast cancer in 2010 (follows with Dr. Andrew Whitney every 6 mons), osteoporosis on Prolia infusions, hyperlipidemia, ocular migraine headaches, diverticulosis, and eczema. Last seen in clinic 4/6/16. Hospitalized at [address] from 4/10-4/14/17 with first episode of acute pancreatitis after presenting with epigastric abdominal pain, nausea, and dry heaves. Labs from 4/10/17: lipase >3000, WBC 12.5 K with left shift, 4/11: K+ 3.3, was repleted. Abd US 4/10: Borderline dilated pancreatic duct at 3.3 mm with trace pericholecystic fluid. CT abd 4/11: pancreas divisum, mild peripancreatic inflammatory changes, compatible with acute pancreatitis, small pelvic free fluid, small pericholecystic fluid, and small bilateral pleural effusions with bilateral dependent atelectasis. Symptoms improved. On discharge on 4/14/17, lipase 1004. Since being home, has not been eating much. Eating small amounts of regular foods; no nausea or vomiting. Had mild abdominal pain and bloating today after eating wheat senait with turkey slice and milk.      ROS  Constitutional: negative for fevers, chills  ENT:   negative for sore throat, nasal congestion, ear pains  Respiratory:  negative for cough, hemoptysis, dyspnea, wheezing  CV:   negative for chest pain, palpitations, lower extremity edema  GI:   negative for heartburn, nausea, vomiting, diarrhea, constipation  Genitourinary: negative for frequency, dysuria and hematuria  Integument:  negative for rash and pruritus  Musculoskel: negative for myalgias, arthralgias, back pain, muscle weakness, joint pain  Neurological:  negative for headaches, dizziness, vertigo, gait problems  Behavl/Psych: negative for feelings of anxiety, depression, mood change     Patient Active Problem List   Diagnosis Code    [facility] C50[facility]919    Hyperlipemia E78.5    Osteoporosis M81.0    Ocular migraine G43. 109    Diverticulosis of colon K57.30    Eczema L30.9    Advance directive on file Z78.9    Mixed hyperlipidemia E78.2    Pancreatitis K85.90     Past Medical History:   Diagnosis Date    breast cancer May 2010    R breast, stage IA; T1b,pN0, M0; Dr. Yari Ware    Hypercholesterolemia     Osteoporosis      Allergies   Allergen Reactions    Prilosec [Omeprazole] Other (comments)     Leg cramps    Zinc Swelling     Jewelry     Current Outpatient Prescriptions   Medication Sig Dispense Refill    CRANBERRY FRUIT EXTRACT (CRANBERRY CONCENTRATE PO) Take 600 mg by mouth.  atorvastatin (LIPITOR) 10 mg tablet Take 10 mg by mouth daily.  Biotin 2,500 mcg cap Take 5,000 mcg by mouth daily.  ranitidine (ACID REDUCER) 75 mg tablet Take 75 mg by mouth daily.  Denosumab (PROLIA) 60 mg/mL injection 60 mg by SubCUTAneous route. every 6 months      anastrozole (ARIMIDEX) 1 mg tablet Take 1 Tab by mouth daily. PHYSICAL EXAM  Visit Vitals    /74    Pulse 62    Temp 97.8 °F (36.6 °C) (Oral)    Resp 16    Ht 5' 3\" (1.6 m)    Wt 123 lb (55.8 kg)    SpO2 97%    BMI 21.79 kg/m2       General: Well-developed and well-nourished, no distress. HEENT:  Head normocephalic/atraumatic, no scleral icterus  Neck: Supple. No carotid bruits, JVD, lymphadenopathy, or thyromegaly. Lungs:  Clear to ausculation bilaterally. Good air movement.   Heart:  Regular rate and rhythm, normal S1 and S2, no murmur, gallop, or rub  Abdomen: Soft, non-distended, normal bowel sounds, mild epigastric tenderness, no guarding, masses, rebound tenderness, or HSM. Extremities: No clubbing, cyanosis, or edema. Neurological: Alert and oriented. Psychiatric: Normal mood and affect. Behavior is normal.       ASSESSMENT AND PLAN    ICD-10-CM ICD-9-CM    1. Idiopathic acute pancreatitis without infection or necrosis K85.00 577.0 LIPASE      PHOSPHORUS      METABOLIC PANEL, COMPREHENSIVE       Catrachito Reyes was seen today for hospital follow up. Diagnoses and all orders for this visit:    Idiopathic acute pancreatitis without infection or necrosis  Advised she remain on low-fat diet and that she drink no alcohol. Etiology remains unclear; she was told it was due to pancreas divisum. More likely due to gallstone or biliary sludge. Normal triglycerides, never a heavy drinker (2-3 drinks of wine or beer a month). -     Keep scheduled appointment with Dr. Yulisa Lin on 4/25/17.         -     She was given a handout on low-fat diet. -     LIPASE  -     PHOSPHORUS  -     METABOLIC PANEL, COMPREHENSIVE      Follow-up Disposition:  Return if symptoms worsen or fail to improve, for Scheduled appointment 6/7/17. Provided patient and/or family with advanced directive information and answered pertinent questions. Encouraged patient to provide a copy of advanced directive to the office when available. I have discussed the diagnosis with the patient and the intended plan as seen in the above orders. Patient is in agreement. The patient has received an after-visit summary and questions were answered concerning future plans. I have discussed medication side effects and warnings with the patient as well.

## 2017-04-19 NOTE — PATIENT INSTRUCTIONS
Low-Fat Diet for Gallbladder Disease: Care Instructions  Your Care Instructions  When you eat, the gallbladder releases bile, which helps you digest the fat in food. If you have an inflamed gallbladder, this may cause pain. A low-fat diet may give your gallbladder a rest so you can start to heal. Your doctor and dietitian can help you make an eating plan that does not irritate your digestive system. Always talk with your doctor or dietitian before you make changes in your diet. Follow-up care is a key part of your treatment and safety. Be sure to make and go to all appointments, and call your doctor if you are having problems. It's also a good idea to know your test results and keep a list of the medicines you take. How can you care for yourself at home? · Eat many small meals and snacks each day instead of three large meals. · Choose lean meats. ¨ Eat no more than 5 to 6½ ounces of meat a day. ¨ Cut off all fat you can see. ¨ Eat chicken and turkey without the skin. ¨ Many types of fish, such as salmon, lake trout, tuna, and herring, provide healthy omega-3 fat. But, avoid fish canned in oil, such as sardines in olive oil. ¨ Bake, broil, or grill meats, poultry, or fish instead of frying them in butter or fat. · Drink or eat nonfat or low-fat milk, yogurt, cheese, or other milk products each day. ¨ Read the labels on cheeses, and choose those with less than 5 grams of fat an ounce. ¨ Try fat-free sour cream, cream cheese, or yogurt. ¨ Avoid cream soups and cream sauces on pasta. ¨ Eat low-fat ice cream, frozen yogurt, or sorbet. Avoid regular ice cream.  · Eat whole-grain cereals, breads, crackers, rice, or pasta. Avoid high-fat foods such as croissants, scones, biscuits, waffles, doughnuts, muffins, granola, and high-fat breads. · Flavor your foods with herbs and spices (such as basil, tarragon, or mint), fat-free sauces, or lemon juice instead of butter.  You can also use butter substitutes, fat-free mayonnaise, or fat-free dressing. · Try applesauce, prune puree, or mashed bananas to replace some or all of the fat when you bake. · Limit fats and oils, such as butter, margarine, mayonnaise, and salad dressing, to no more than 1 tablespoon a meal.  · Avoid high-fat foods, such as:  ¨ Chocolate, whole milk, ice cream, and processed cheese. ¨ Fried or buttered foods. ¨ Sausage, salami, and brandon. ¨ Cinnamon rolls, cakes, pies, cookies, and other pastries. ¨ Prepared snack foods, such as potato chips, nut and granola bars, and mixed nuts. ¨ Coconut and avocado. · Learn how to read food labels for serving sizes and ingredients. Fast-food and convenience-food meals often have lots of fat. Where can you learn more? Go to http://scout-catalina.info/. Enter H028 in the search box to learn more about \"Low-Fat Diet for Gallbladder Disease: Care Instructions. \"  Current as of: July 26, 2016  Content Version: 11.2  © 7400-3682 [employer], [employer]. Care instructions adapted under license by [employer] (which disclaims liability or warranty for this information). If you have questions about a medical condition or this instruction, always ask your healthcare professional. [place] [address] any warranty or liability for your use of this information.

## 2017-04-20 LAB
ALBUMIN SERPL-MCNC: 4.5 G/DL (ref 3.5–4.8)
ALBUMIN/GLOB SERPL: 1.7 {RATIO} (ref 1.2–2.2)
ALP SERPL-CCNC: 68 IU/L (ref 39–117)
ALT SERPL-CCNC: 14 IU/L (ref 0–32)
AST SERPL-CCNC: 18 IU/L (ref 0–40)
BILIRUB SERPL-MCNC: 0.3 MG/DL (ref 0–1.2)
BUN SERPL-MCNC: 18 MG/DL (ref 8–27)
BUN/CREAT SERPL: 23 (ref 12–28)
CALCIUM SERPL-MCNC: 9.3 MG/DL (ref 8.7–10.3)
CHLORIDE SERPL-SCNC: 100 MMOL/L (ref 96–106)
CO2 SERPL-SCNC: 25 MMOL/L (ref 18–29)
CREAT SERPL-MCNC: 0.8 MG/DL (ref 0.57–1)
GLOBULIN SER CALC-MCNC: 2.6 G/DL (ref 1.5–4.5)
GLUCOSE SERPL-MCNC: 86 MG/DL (ref 65–99)
LIPASE SERPL-CCNC: 391 U/L (ref 0–59)
PHOSPHATE SERPL-MCNC: 3.2 MG/DL (ref 2.5–4.5)
POTASSIUM SERPL-SCNC: 4.4 MMOL/L (ref 3.5–5.2)
PROT SERPL-MCNC: 7.1 G/DL (ref 6–8.5)
SODIUM SERPL-SCNC: 140 MMOL/L (ref 134–144)

## 2017-04-20 NOTE — PROGRESS NOTES
Spoke with patient and after verifying name and date of birth of patient gave her test results and instructions per Dr. Helen Lange. Patient stated understanding.

## 2017-04-20 NOTE — PROGRESS NOTES
Your labs showed normal potassium, phosphorous, kidney and liver tests. Your lipase level is 391, which is still high but much better than at discharge when it was 1,004. Normal is less than 60. Eat small, frequent, low-fat meals as tolerated.

## 2017-04-24 ENCOUNTER — PATIENT OUTREACH (OUTPATIENT)
Dept: INTERNAL MEDICINE CLINIC | Age: 72
End: 2017-04-24

## 2017-04-24 NOTE — PROGRESS NOTES
NN spoke to patient for follow up post hospitalization. Patient states that she is doing much better. She is not having any more pain and is able to eat regular foods without any difficulties. She followed up with Dr. Sal Phoenix on 4/19/17 and has a follow up appointment with Dr. Jose A Ramírez on 4/25/17. The patient does not have any further questions at this time, but has NN contact information if future questions arise.

## 2017-04-26 ENCOUNTER — TELEPHONE (OUTPATIENT)
Dept: INTERNAL MEDICINE CLINIC | Age: 72
End: 2017-04-26

## 2017-04-26 NOTE — TELEPHONE ENCOUNTER
----- Message from Jorge Mosquera sent at 4/26/2017  9:13 AM EDT -----  Regarding: Yannick  Contact: [phone]  Patient called to patient called to speak with nurse to get her records faxed to [facility] dr Kalry Roberson. Phone number [phone] Fax number [phone]. Patient has an appointment Friday.

## 2017-05-08 ENCOUNTER — HOSPITAL ENCOUNTER (OUTPATIENT)
Dept: MRI IMAGING | Age: 72
Discharge: HOME | End: 2017-05-08
Attending: INTERNAL MEDICINE
Payer: COMMERCIAL

## 2017-05-08 VITALS — BODY MASS INDEX: 20.9 KG/M2 | WEIGHT: 118 LBS

## 2017-05-08 DIAGNOSIS — K85.90 ACUTE PANCREATITIS: ICD-10-CM

## 2017-05-08 PROCEDURE — A9585 GADOBUTROL INJECTION: HCPCS | Performed by: INTERNAL MEDICINE

## 2017-05-08 PROCEDURE — 74183 MRI ABD W/O CNTR FLWD CNTR: CPT

## 2017-05-08 PROCEDURE — 74011000258 HC RX REV CODE- 258: Performed by: INTERNAL MEDICINE

## 2017-05-08 PROCEDURE — 74011250636 HC RX REV CODE- 250/636: Performed by: INTERNAL MEDICINE

## 2017-05-08 RX ORDER — SODIUM CHLORIDE 0.9 % (FLUSH) 0.9 %
10 SYRINGE (ML) INJECTION
Status: COMPLETED | OUTPATIENT
Start: 2017-05-08 | End: 2017-05-08

## 2017-05-08 RX ADMIN — GADOBUTROL 5 ML: 604.72 INJECTION INTRAVENOUS at 18:09

## 2017-05-08 RX ADMIN — SODIUM CHLORIDE 100 ML: 900 INJECTION, SOLUTION INTRAVENOUS at 18:10

## 2017-05-08 RX ADMIN — Medication 10 ML: at 18:10

## 2017-05-22 ENCOUNTER — PATIENT OUTREACH (OUTPATIENT)
Dept: INTERNAL MEDICINE CLINIC | Age: 72
End: 2017-05-22

## 2017-05-22 NOTE — PROGRESS NOTES
NN Note: Patient is greater than 30 days post episode. Pt has attended follow up appointment and has no re-admission of hospitalization within 30 days. Goals met and completed. Pt has not reached out to NN for additional questions or concerns. No further needs identified. At this time resolving episode. Will follow as needed.

## 2017-06-07 ENCOUNTER — OFFICE VISIT (OUTPATIENT)
Dept: INTERNAL MEDICINE CLINIC | Age: 72
End: 2017-06-07

## 2017-06-07 VITALS
OXYGEN SATURATION: 96 % | WEIGHT: 119 LBS | SYSTOLIC BLOOD PRESSURE: 145 MMHG | HEART RATE: 64 BPM | RESPIRATION RATE: 16 BRPM | BODY MASS INDEX: 21.09 KG/M2 | HEIGHT: 63 IN | TEMPERATURE: 98.1 F | DIASTOLIC BLOOD PRESSURE: 67 MMHG

## 2017-06-07 DIAGNOSIS — R39.15 URINARY URGENCY: ICD-10-CM

## 2017-06-07 DIAGNOSIS — E78.2 MIXED HYPERLIPIDEMIA: ICD-10-CM

## 2017-06-07 DIAGNOSIS — Z23 ENCOUNTER FOR IMMUNIZATION: ICD-10-CM

## 2017-06-07 DIAGNOSIS — I10 ESSENTIAL HYPERTENSION: ICD-10-CM

## 2017-06-07 DIAGNOSIS — Z00.00 ROUTINE GENERAL MEDICAL EXAMINATION AT A HEALTH CARE FACILITY: Primary | ICD-10-CM

## 2017-06-07 DIAGNOSIS — Z87.19 HISTORY OF ACUTE PANCREATITIS: ICD-10-CM

## 2017-06-07 DIAGNOSIS — M81.0 OSTEOPOROSIS, UNSPECIFIED OSTEOPOROSIS TYPE, UNSPECIFIED PATHOLOGICAL FRACTURE PRESENCE: ICD-10-CM

## 2017-06-07 DIAGNOSIS — C50.919 BREAST CANCER, STAGE 1, UNSPECIFIED LATERALITY (HCC): ICD-10-CM

## 2017-06-07 LAB
BILIRUB UR QL STRIP: NEGATIVE
GLUCOSE UR-MCNC: NEGATIVE MG/DL
KETONES P FAST UR STRIP-MCNC: NEGATIVE MG/DL
PH UR STRIP: 5.5 [PH] (ref 4.6–8)
PROT UR QL STRIP: NEGATIVE MG/DL
SP GR UR STRIP: 1.02 (ref 1–1.03)
UA UROBILINOGEN AMB POC: NORMAL (ref 0.2–1)
URINALYSIS CLARITY POC: CLEAR
URINALYSIS COLOR POC: NORMAL
URINE BLOOD POC: NORMAL
URINE LEUKOCYTES POC: NEGATIVE
URINE NITRITES POC: NEGATIVE

## 2017-06-07 RX ORDER — AMLODIPINE BESYLATE 2.5 MG/1
2.5 TABLET ORAL DAILY
Qty: 30 TAB | Refills: 2 | Status: SHIPPED | OUTPATIENT
Start: 2017-06-07 | End: 2017-10-06 | Stop reason: SDUPTHER

## 2017-06-07 NOTE — PROGRESS NOTES
Reviewed record  In preparation for visit and have obtained necessary documentation. 1. Have you been to the ER, urgent care clinic since your last visit? Hospitalized since your last visit?no  2. Have you seen or consulted any other health care providers outside of the [address] [place] [address] since your last visit? Include any pap smears or colon screening. Sees Dr Verdie Nissen directives on file and in flowsheet. Had eye exam with Dr Ruthie Carrillo at [facility]. Mammogram scheduled at [facility] next week. Per Dr. Jatinder Pope verbal order read back orders placed for poc urine dip and  Urine reflex to culture  Pneumococcal 13-valent (Prevnar 13)Conjugate vaccine 0.5 ml given left deltoid IM. "[email or web address]" Pharm exp. 4/18 Lot [id number] VIS given. Patient tolerated procedure without incident.

## 2017-06-07 NOTE — PATIENT INSTRUCTIONS
Vaccine Information Statement     Pneumococcal Conjugate Vaccine (PCV13): What You Need to Know    Many Vaccine Information Statements are available in Khmer and other languages. See www.immunize.org/vis. Hojas de información Sobre Vacunas están disponibles en español y en muchos otros idiomas. Visite www.immunize.org/vis. 1. Why get vaccinated? Vaccination can protect both children and adults from pneumococcal disease. Pneumococcal disease is caused by bacteria that can spread from person to person through close contact. It can cause ear infections, and it can also lead to more serious infections of the:   Lungs (pneumonia),   Blood (bacteremia), and   Covering of the brain and spinal cord (meningitis). Pneumococcal pneumonia is most common among adults. Pneumococcal meningitis can cause deafness and brain damage, and it kills about 1 child in 10 who get it. Anyone can get pneumococcal disease, but children under 3years of age and adults 72 years and older, people with certain medical conditions, and cigarette smokers are at the highest risk. Before there was a vaccine, the [facility] saw:   more than 700 cases of meningitis,   about 13,000 blood infections,   about 5 million ear infections, and   about 200 deaths  in children under 5 each year from pneumococcal disease. Since vaccine became available, severe pneumococcal disease in these children has fallen by 88%. About 18,000 older adults die of pneumococcal disease each year in the [place]. Treatment of pneumococcal infections with penicillin and other drugs is not as effective as it used to be, because some strains of the disease have become resistant to these drugs. This makes prevention of the disease, through vaccination, even more important. 2. PCV13 vaccine    Pneumococcal conjugate vaccine (called PCV13) protects against 13 types of pneumococcal bacteria.       PCV13 is routinely given to children at 2, 4, 6, and 1515 months of age. It is also recommended for children and adults 3to 59years of age with certain health conditions, and for all adults 72years of age and older. Your doctor can give you details. 3. Some people should not get this vaccine    Anyone who has ever had a life-threatening allergic reaction to a dose of this vaccine, to an earlier pneumococcal vaccine called PCV7, or to any vaccine containing diphtheria toxoid (for example, DTaP), should not get PCV13. Anyone with a severe allergy to any component of PCV13 should not get the vaccine. Tell your doctor if the person being vaccinated has any severe allergies. If the person scheduled for vaccination is not feeling well, your healthcare provider might decide to reschedule the shot on another day. 4. Risks of a vaccine reaction    With any medicine, including vaccines, there is a chance of reactions. These are usually mild and go away on their own, but serious reactions are also possible. Problems reported following PCV13 varied by age and dose in the series. The most common problems reported among children were:    About half became drowsy after the shot, had a temporary loss of appetite, or had redness or tenderness where the shot was given.  About 1 out of 3 had swelling where the shot was given.  About 1 out of 3 had a mild fever, and about 1 in 20 had a fever over 102.2°F.   Up to about 8 out of 10 became fussy or irritable. Adults have reported pain, redness, and swelling where the shot was given; also mild fever, fatigue, headache, chills, or muscle pain. [place] children who get PCV13 along with inactivated flu vaccine at the same time may be at increased risk for seizures caused by fever. Ask your doctor for more information. Problems that could happen after any vaccine:     People sometimes faint after a medical procedure, including vaccination.  Sitting or lying down for about 15 minutes can help prevent fainting, and injuries caused by a fall. Tell your doctor if you feel dizzy, or have vision changes or ringing in the ears.  Some older children and adults get severe pain in the shoulder and have difficulty moving the arm where a shot was given. This happens very rarely.  Any medication can cause a severe allergic reaction. Such reactions from a vaccine are very rare, estimated at about 1 in a million doses, and would happen within a few minutes to a few hours after the vaccination. As with any medicine, there is a very small chance of a vaccine causing a serious injury or death. The safety of vaccines is always being monitored. For more information, visit: www.cdc.gov/vaccinesafety/     5. What if there is a serious reaction? What should I look for?  Look for anything that concerns you, such as signs of a severe allergic reaction, very high fever, or unusual behavior. Signs of a severe allergic reaction can include hives, swelling of the face and throat, difficulty breathing, a fast heartbeat, dizziness, and weakness  usually within a few minutes to a few hours after the vaccination. What should I do?  If you think it is a severe allergic reaction or other emergency that cant wait, call 9-1-1 or get the person to the nearest hospital. Otherwise, call your doctor. Reactions should be reported to the Vaccine Adverse Event Reporting System (VAERS). Your doctor should file this report, or you can do it yourself through the VAERS web site at www.vaers. hhs.gov, or by calling 3-824.841.2868. VAERS does not give medical advice. 6. The National Vaccine Injury Compensation Program    The [facility] Vaccine Injury Compensation Program (VICP) is a federal program that was created to compensate people who may have been injured by certain vaccines.     Persons who believe they may have been injured by a vaccine can learn about the program and about filing a claim by calling 1-508.558.1642 or visiting the [employer] website at www.[facility].gov/vaccinecompensation. There is a time limit to file a claim for compensation. 7. How can I learn more?  Ask your healthcare provider. He or she can give you the vaccine package insert or suggest other sources of information.  Call your local or state health department.  Contact the Centers for Disease Control and Prevention (CDC):  - Call 5-449.670.9240 (2-525-DHY-INFO) or  - Visit CDCs website at www.cdc.gov/vaccines    Vaccine Information Statement   PCV13 Vaccine   11/5/2015   43 ANTELMO Rivera [id number]    Department of Health and Human Services  Centers for Disease Control and Prevention    Office Use Only       Learning About High Blood Pressure  What is high blood pressure? Blood pressure is a measure of how hard the blood pushes against the walls of your arteries. It's normal for blood pressure to go up and down throughout the day, but if it stays up, you have high blood pressure. Another name for high blood pressure is hypertension. Two numbers tell you your blood pressure. The first number is the systolic pressure. It shows how hard the blood pushes when your heart is pumping. The second number is the diastolic pressure. It shows how hard the blood pushes between heartbeats, when your heart is relaxed and filling with blood. A blood pressure of less than 120/80 (say \"120 over 80\") is ideal for an adult. High blood pressure is 140/90 or higher. You have high blood pressure if your top number is 140 or higher or your bottom number is 90 or higher, or both. Many people fall into the category in between, called prehypertension. People with prehypertension need to make lifestyle changes to bring their blood pressure down and help prevent or delay high blood pressure. What happens when you have high blood pressure? · Blood flows through your arteries with too much force. Over time, this damages the walls of your arteries.  But you can't feel it. High blood pressure usually doesn't cause symptoms. · Fat and calcium start to build up in your arteries. This buildup is called plaque. Plaque makes your arteries narrower and stiffer. Blood can't flow through them as easily. · This lack of good blood flow starts to damage some of the organs in your body. This can lead to problems such as coronary artery disease and heart attack, heart failure, stroke, kidney failure, and eye damage. How can you prevent high blood pressure? · Stay at a healthy weight. · Try to limit how much sodium you eat to less than 2,300 milligrams (mg) a day. If you limit your sodium to 1,500 mg a day, you can lower your blood pressure even more. ¨ Buy foods that are labeled \"unsalted,\" \"sodium-free,\" or \"low-sodium. \" Foods labeled \"reduced-sodium\" and \"light sodium\" may still have too much sodium. ¨ Flavor your food with garlic, lemon juice, onion, vinegar, herbs, and spices instead of salt. Do not use soy sauce, steak sauce, onion salt, garlic salt, mustard, or ketchup on your food. ¨ Use less salt (or none) when recipes call for it. You can often use half the salt a recipe calls for without losing flavor. · Be physically active. Get at least 30 minutes of exercise on most days of the week. Walking is a good choice. You also may want to do other activities, such as running, swimming, cycling, or playing tennis or team sports. · Limit alcohol to 2 drinks a day for men and 1 drink a day for women. · Eat plenty of fruits, vegetables, and low-fat dairy products. Eat less saturated and total fats. How is high blood pressure treated? · Your doctor will suggest making lifestyle changes. For example, your doctor may ask you to eat healthy foods, quit smoking, lose extra weight, and be more active. · If lifestyle changes don't help enough or your blood pressure is very high, you will have to take medicine every day. Follow-up care is a key part of your treatment and safety.  Be sure to make and go to all appointments, and call your doctor if you are having problems. It's also a good idea to know your test results and keep a list of the medicines you take. Where can you learn more? Go to http://scout-catalina.info/. Enter P501 in the search box to learn more about \"Learning About High Blood Pressure. \"  Current as of: March 23, 2016  Content Version: 11.2  © 8683-4212 [employer]. Care instructions adapted under license by [employer] (which disclaims liability or warranty for this information). If you have questions about a medical condition or this instruction, always ask your healthcare professional. [place] [address] any warranty or liability for your use of this information.

## 2017-06-07 NOTE — MR AVS SNAPSHOT
Visit Information Date & Time Provider Department Dept. Phone Encounter #  
 6/7/2017  8:00 AM Jermaine Collins, [facility] [phone] [id number] Follow-up Instructions Return in about 2 months (around 8/7/2017), or if symptoms worsen or fail to improve, for BP check. Upcoming Health Maintenance Date Due  
 GLAUCOMA SCREENING Q2Y 9/9/2016 Pneumococcal 65+ High/Highest Risk (2 of 2 - PPSV23) 9/21/2016 INFLUENZA AGE 9 TO ADULT 8/1/2017 COLONOSCOPY 4/30/2018 BREAST CANCER SCRN MAMMOGRAM 6/6/2018 MEDICARE YEARLY EXAM 6/8/2018 DTaP/Tdap/Td series (3 - Td) 5/9/2026 Allergies as of 6/7/2017  Review Complete On: 6/7/2017 By: Jermaine Collins MD  
  
 Severity Noted Reaction Type Reactions Prilosec [Omeprazole]  02/07/2011    Other (comments) Leg cramps Zinc  07/17/2015    Swelling Jewelry Current Immunizations  Reviewed on 4/12/2017 Name Date Influenza High Dose Vaccine PF 10/12/2015, 10/9/2014, 10/3/2013 Pneumococcal Conjugate (PCV-13)  Incomplete Pneumococcal Vaccine (Unspecified Type) 9/21/2011 TDAP Vaccine 6/6/2006 Td, Adsorbed PF 5/9/2016 Zoster 10/28/2011 Not reviewed this visit You Were Diagnosed With   
  
 Codes Comments Routine general medical examination at a health care facility    -  Primary ICD-10-CM: Z00.00 ICD-9-CM: V70.0 Essential hypertension     ICD-10-CM: I10 
ICD-9-CM: 401.9 History of acute pancreatitis     ICD-10-CM: Z87.19 ICD-9-CM: V12.79 Mixed hyperlipidemia     ICD-10-CM: E78.2 ICD-9-CM: 272.2 Breast cancer, stage 1, unspecified laterality     ICD-10-CM: C50.919 ICD-9-CM: 174.9 Osteoporosis, unspecified osteoporosis type, unspecified pathological fracture presence     ICD-10-CM: M81.0 ICD-9-CM: 733.00 Encounter for immunization     ICD-10-CM: U45 ICD-9-CM: V03.89 Vitals BP Pulse Temp Resp Height(growth percentile) Weight(growth percentile) 145/67 (BP 1 Location: Left arm, BP Patient Position: Sitting) 64 98.1 °F (36.7 °C) (Oral) 16 5' 3\" (1.6 m) 119 lb (54 kg) SpO2 BMI OB Status Smoking Status 96% 21.08 kg/m2 Postmenopausal Never Smoker BMI and BSA Data Body Mass Index Body Surface Area 21.08 kg/m 2 1.55 m 2 Preferred Pharmacy Pharmacy Name Phone [facility], [facility] [address] AT [address] [place] [address] Drive [phone] Your Updated Medication List  
  
   
This list is accurate as of: 6/7/17  8:39 AM.  Always use your most recent med list.  
  
  
  
  
 Acid Reducer 75 mg tablet Generic drug:  raNITIdine Take 75 mg by mouth daily. amLODIPine 2.5 mg tablet Commonly known as:  Primitivo Yudith Take 1 Tab by mouth daily. Indications: hypertension  
  
 anastrozole 1 mg tablet Commonly known as:  ARIMIDEX Take 1 Tab by mouth daily. atorvastatin 10 mg tablet Commonly known as:  LIPITOR  
TAKE 1 TABLET DAILY Biotin 2,500 mcg Cap Take 5,000 mcg by mouth daily. CRANBERRY CONCENTRATE PO Take 600 mg by mouth. PROLIA 60 mg/mL injection Generic drug:  denosumab 60 mg by SubCUTAneous route. every 6 months Prescriptions Sent to Pharmacy Refills  
 amLODIPine (NORVASC) 2.5 mg tablet 2 Sig: Take 1 Tab by mouth daily. Indications: hypertension Class: Normal  
 Pharmacy: [facility] [address] AT [facility] [facility] [address] P Ph #: [phone] Route: Oral  
  
We Performed the Following CBC WITH AUTOMATED DIFF [28908 CPT(R)] HEMOGLOBIN A1C WITH EAG [92476 CPT(R)] LIPASE [id number] CPT(R)] LIPID PANEL [43353 CPT(R)] METABOLIC PANEL, COMPREHENSIVE [99329 CPT(R)] PNEUMOCOCCAL CONJ VACCINE 13 VALENT IM [id number] CPT(R)] ME IMMUNIZ ADMIN,1 SINGLE/COMB VAC/TOXOID [id number] CPT(R)] TSH AND FREE T4 [22333 CPT(R)] Follow-up Instructions Return in about 2 months (around 8/7/2017), or if symptoms worsen or fail to improve, for BP check. Patient Instructions Vaccine Information Statement Pneumococcal Conjugate Vaccine (PCV13): What You Need to Know Many Vaccine Information Statements are available in American and other languages. See www.immunize.org/vis. Hojas de información Sobre Vacunas están disponibles en español y en muchos otros idiomas. Visite www.immunize.org/vis. 1. Why get vaccinated? Vaccination can protect both children and adults from pneumococcal disease. Pneumococcal disease is caused by bacteria that can spread from person to person through close contact. It can cause ear infections, and it can also lead to more serious infections of the: 
 Lungs (pneumonia),  Blood (bacteremia), and 
 Covering of the brain and spinal cord (meningitis). Pneumococcal pneumonia is most common among adults. Pneumococcal meningitis can cause deafness and brain damage, and it kills about 1 child in 10 who get it. Anyone can get pneumococcal disease, but children under 3years of age and adults 72 years and older, people with certain medical conditions, and cigarette smokers are at the highest risk. Before there was a vaccine, the [facility] saw: 
 more than 700 cases of meningitis, 
 about 13,000 blood infections, 
 about 5 million ear infections, and 
 about 200 deaths 
in children under 5 each year from pneumococcal disease. Since vaccine became available, severe pneumococcal disease in these children has fallen by 88%. About 18,000 older adults die of pneumococcal disease each year in the [place].  
 
Treatment of pneumococcal infections with penicillin and other drugs is not as effective as it used to be, because some strains of the disease have become resistant to these drugs. This makes prevention of the disease, through vaccination, even more important. 2. PCV13 vaccine Pneumococcal conjugate vaccine (called PCV13) protects against 13 types of pneumococcal bacteria. PCV13 is routinely given to children at 2, 4, 6, and 1515 months of age. It is also recommended for children and adults 3to 59years of age with certain health conditions, and for all adults 72years of age and older. Your doctor can give you details. 3. Some people should not get this vaccine Anyone who has ever had a life-threatening allergic reaction to a dose of this vaccine, to an earlier pneumococcal vaccine called PCV7, or to any vaccine containing diphtheria toxoid (for example, DTaP), should not get PCV13. Anyone with a severe allergy to any component of PCV13 should not get the vaccine. Tell your doctor if the person being vaccinated has any severe allergies. If the person scheduled for vaccination is not feeling well, your healthcare provider might decide to reschedule the shot on another day. 4. Risks of a vaccine reaction With any medicine, including vaccines, there is a chance of reactions. These are usually mild and go away on their own, but serious reactions are also possible. Problems reported following PCV13 varied by age and dose in the series. The most common problems reported among children were:  About half became drowsy after the shot, had a temporary loss of appetite, or had redness or tenderness where the shot was given.  About 1 out of 3 had swelling where the shot was given.  About 1 out of 3 had a mild fever, and about 1 in 20 had a fever over 102.2°F. 
 Up to about 8 out of 10 became fussy or irritable. Adults have reported pain, redness, and swelling where the shot was given; also mild fever, fatigue, headache, chills, or muscle pain.  
 
Young children who get PCV13 along with inactivated flu vaccine at the same time may be at increased risk for seizures caused by fever. Ask your doctor for more information. Problems that could happen after any vaccine:  People sometimes faint after a medical procedure, including vaccination. Sitting or lying down for about 15 minutes can help prevent fainting, and injuries caused by a fall. Tell your doctor if you feel dizzy, or have vision changes or ringing in the ears.  Some older children and adults get severe pain in the shoulder and have difficulty moving the arm where a shot was given. This happens very rarely.  Any medication can cause a severe allergic reaction. Such reactions from a vaccine are very rare, estimated at about 1 in a million doses, and would happen within a few minutes to a few hours after the vaccination. As with any medicine, there is a very small chance of a vaccine causing a serious injury or death. The safety of vaccines is always being monitored. For more information, visit: www.cdc.gov/vaccinesafety/  
 
5. What if there is a serious reaction? What should I look for?  Look for anything that concerns you, such as signs of a severe allergic reaction, very high fever, or unusual behavior. Signs of a severe allergic reaction can include hives, swelling of the face and throat, difficulty breathing, a fast heartbeat, dizziness, and weakness  usually within a few minutes to a few hours after the vaccination. What should I do?  If you think it is a severe allergic reaction or other emergency that cant wait, call 9-1-1 or get the person to the nearest hospital. Otherwise, call your doctor. Reactions should be reported to the Vaccine Adverse Event Reporting System (VAERS). Your doctor should file this report, or you can do it yourself through the VAERS web site at www.vaers. hhs.gov, or by calling 8-682.573.5106. VAERS does not give medical advice.  
 
6. The National Vaccine Injury [employer] [place] 
 
 The [employer] Injury Compensation Program (VICP) is a federal program that was created to compensate people who may have been injured by certain vaccines. Persons who believe they may have been injured by a vaccine can learn about the program and about filing a claim by calling 0-378.840.9580 or visiting the [employer] website at www.[facility].gov/vaccinecompensation. There is a time limit to file a claim for compensation. 7. How can I learn more?  Ask your healthcare provider. He or she can give you the vaccine package insert or suggest other sources of information.  Call your local or state health department.  Contact the Centers for Disease Control and Prevention (CDC): 
- Call 2-621.854.2750 (1-800-CDC-INFO) or 
- Visit CDCs website at www.cdc.gov/vaccines Vaccine Information Statement PCV13 Vaccine 11/5/2015  
42 ANTELMO Culver [id number] Department of Health and [place] Centers for Disease Control and Prevention Office Use Only Learning About High Blood Pressure What is high blood pressure? Blood pressure is a measure of how hard the blood pushes against the walls of your arteries. It's normal for blood pressure to go up and down throughout the day, but if it stays up, you have high blood pressure. Another name for high blood pressure is hypertension. Two numbers tell you your blood pressure. The first number is the systolic pressure. It shows how hard the blood pushes when your heart is pumping. The second number is the diastolic pressure. It shows how hard the blood pushes between heartbeats, when your heart is relaxed and filling with blood. A blood pressure of less than 120/80 (say \"120 over 80\") is ideal for an adult. High blood pressure is 140/90 or higher. You have high blood pressure if your top number is 140 or higher or your bottom number is 90 or higher, or both.  Many people fall into the category in between, called prehypertension. People with prehypertension need to make lifestyle changes to bring their blood pressure down and help prevent or delay high blood pressure. What happens when you have high blood pressure? · Blood flows through your arteries with too much force. Over time, this damages the walls of your arteries. But you can't feel it. High blood pressure usually doesn't cause symptoms. · Fat and calcium start to build up in your arteries. This buildup is called plaque. Plaque makes your arteries narrower and stiffer. Blood can't flow through them as easily. · This lack of good blood flow starts to damage some of the organs in your body. This can lead to problems such as coronary artery disease and heart attack, heart failure, stroke, kidney failure, and eye damage. How can you prevent high blood pressure? · Stay at a healthy weight. · Try to limit how much sodium you eat to less than 2,300 milligrams (mg) a day. If you limit your sodium to 1,500 mg a day, you can lower your blood pressure even more. ¨ Buy foods that are labeled \"unsalted,\" \"sodium-free,\" or \"low-sodium. \" Foods labeled \"reduced-sodium\" and \"light sodium\" may still have too much sodium. ¨ Flavor your food with garlic, lemon juice, onion, vinegar, herbs, and spices instead of salt. Do not use soy sauce, steak sauce, onion salt, garlic salt, mustard, or ketchup on your food. ¨ Use less salt (or none) when recipes call for it. You can often use half the salt a recipe calls for without losing flavor. · Be physically active. Get at least 30 minutes of exercise on most days of the week. Walking is a good choice. You also may want to do other activities, such as running, swimming, cycling, or playing tennis or team sports. · Limit alcohol to 2 drinks a day for men and 1 drink a day for women. · Eat plenty of fruits, vegetables, and low-fat dairy products. Eat less saturated and total fats. How is high blood pressure treated? · Your doctor will suggest making lifestyle changes. For example, your doctor may ask you to eat healthy foods, quit smoking, lose extra weight, and be more active. · If lifestyle changes don't help enough or your blood pressure is very high, you will have to take medicine every day. Follow-up care is a key part of your treatment and safety. Be sure to make and go to all appointments, and call your doctor if you are having problems. It's also a good idea to know your test results and keep a list of the medicines you take. Where can you learn more? Go to http://scout-catalina.info/. Enter P501 in the search box to learn more about \"Learning About High Blood Pressure. \" Current as of: March 23, 2016 Content Version: 11.2 © 5841-4356 [employer]. Care instructions adapted under license by [employer] (which disclaims liability or warranty for this information). If you have questions about a medical condition or this instruction, always ask your healthcare professional. [address] any warranty or liability for your use of this information. Introducing \[facility] [place] [facility]\"" & HEALTH SERVICES! Dear Rafael Gipson: Thank you for requesting a Joy[employer].com account. Our records indicate that you already have an active [email or web address] account. You can access your account anytime at [email or web address] Did you know that you can access your hospital and ER discharge instructions at any time in [email or web address]? You can also review all of your test results from your hospital stay or ER visit. Additional Information If you have questions, please visit the Frequently Asked Questions section of the [email or web address] website at [email or web address]/. Remember, [email or web address] is NOT to be used for urgent needs. For medical emergencies, dial 911. Now available from your iPhone and Android! Please provide this summary of care documentation to your next provider. Your primary care clinician is listed as Brian Marcial. If you have any questions after today's visit, please call [phone].

## 2017-06-07 NOTE — PROGRESS NOTES
CC:  Chief Complaint   Patient presents with    Physical    Immunization/Injection     HISTORY OF PRESENT ILLNESS  Aure Hillman is a 71 y.o. female. Presents for annual physical examination. She has a history of breast cancer in 2010 (follows with Dr. Lila López every 6 mons), osteoporosis on Prolia infusions, hyperlipidemia, history of ocular migraine headaches, diverticulosis, and eczema. She does not have Medicare as her primary insurance. She complains sensation of bladder pressure with some urinary urgency. Denies dysuria or frequency.     Hospitalized at [facility] from 4/10-4/14/17 with first episode of acute pancreatitis. Since discharge, has stopped all alcohol and stays on a low-fat diet. Had eye exam with Dr. Jeanne Samuels at [facility] last month. ROS  Constitutional: negative for fevers, chills, night sweats  ENT:   negative for sore throat, nasal congestion, ear pains  Respiratory:  negative for cough, hemoptysis, dyspnea,wheezing  CV:   negative for chest pain, palpitations, lower extremity edema  GI:   negative for heartburn, abd pain, nausea, vomiting, diarrhea, constipation  Genitourinary: negative for frequency, dysuria and hematuria  Integument:  negative for rash and pruritus  Musculoskel: negative for myalgias, arthralgias, back pain, muscle weakness, joint pain  Neurological:  negative for headaches, dizziness, vertigo, gait problems  Behavl/Psych: negative for feelings of anxiety, depression, mood change     Patient Active Problem List   Diagnosis Code    Breast cancer, stage 1 ([facility] [address] C50.919    Hyperlipemia E78.5    Osteoporosis M81.0    Ocular migraine G43. 109    Diverticulosis of colon K57.30    Eczema L30.9    Advance directive on file Z78.9    Mixed hyperlipidemia E78.2    Pancreatitis K85.90     Past Medical History:   Diagnosis Date    breast cancer May 2010    R breast, stage IA; T1b,pN0, M0; Dr. Munir Paniagua    Hypercholesterolemia     Osteoporosis Allergies   Allergen Reactions    Prilosec [Omeprazole] Other (comments)     Leg cramps    Zinc Swelling     Jewelry     Current Outpatient Prescriptions   Medication Sig Dispense Refill    atorvastatin (LIPITOR) 10 mg tablet TAKE 1 TABLET DAILY 90 Tab 3    CRANBERRY FRUIT EXTRACT (CRANBERRY CONCENTRATE PO) Take 600 mg by mouth.  Biotin 2,500 mcg cap Take 5,000 mcg by mouth daily.  ranitidine (ACID REDUCER) 75 mg tablet Take 75 mg by mouth daily.  Denosumab (PROLIA) 60 mg/mL injection 60 mg by SubCUTAneous route. every 6 months      anastrozole (ARIMIDEX) 1 mg tablet Take 1 Tab by mouth daily. PHYSICAL EXAM  Visit Vitals    /67 (BP 1 Location: Left arm, BP Patient Position: Sitting)    Pulse 64    Temp 98.1 °F (36.7 °C) (Oral)    Resp 16    Ht 5' 3\" (1.6 m)    Wt 119 lb (54 kg)    SpO2 96%    BMI 21.08 kg/m2   Wt decrease from 123 lbs to 119 lbs at last clinic visit on 4/19/17    General: Well-developed and well-nourished, no distress. HEENT: Head normocephalic/atraumatic, no scleral icterus  Neck: Supple. No carotid bruits, JVD, lymphadenopathy, or thyromegaly. Lungs:  Clear to ausculation bilaterally. Good air movement. Heart: Regular rate and rhythm, normal S1 and S2, no murmur, gallop, or rub  Abdomen: Soft, non-distended, normal bowel sounds, no tenderness, no guarding, masses, rebound tenderness, or HSM. Extremities: No clubbing, cyanosis, or edema. Neurological: Alert and oriented. Psychiatric: Normal mood and affect.  Behavior is normal.     Results for orders placed or performed in visit on 06/07/17   AMB POC URINALYSIS DIP STICK AUTO W/O MICRO   Result Value Ref Range    Color (UA POC) Light Yellow     Clarity (UA POC) Clear     Glucose (UA POC) Negative Negative    Bilirubin (UA POC) Negative Negative    Ketones (UA POC) Negative Negative    Specific gravity (UA POC) 1.025 1.001 - 1.035    Blood (UA POC) Trace Negative    pH (UA POC) 5.5 4.6 - 8.0 Protein (UA POC) Negative Negative mg/dL    Urobilinogen (UA POC) 0.2 mg/dL 0.2 - 1    Nitrites (UA POC) Negative Negative    Leukocyte esterase (UA POC) Negative Negative         ASSESSMENT AND PLAN    ICD-10-CM ICD-9-CM    1. Routine general medical examination at a health care facility O47.13 J41.6 METABOLIC PANEL, COMPREHENSIVE      CBC WITH AUTOMATED DIFF      HEMOGLOBIN A1C WITH EAG   2. Essential hypertension I10 401.9 amLODIPine (NORVASC) 2.5 mg tablet   3. History of acute pancreatitis Z87.19 V12.79 LIPASE   4. Mixed hyperlipidemia E78.2 272.2 LIPID PANEL      TSH AND FREE T4   5. Breast cancer, stage 1, unspecified laterality C50.919 174.9    6. Osteoporosis, unspecified osteoporosis type, unspecified pathological fracture presence M81.0 733.00    7. Encounter for immunization Z23 V03.89 PNEUMOCOCCAL CONJ VACCINE 13 VALENT IM      FL IMMUNIZ ADMIN,1 SINGLE/COMB VAC/TOXOID   8. Urinary urgency R39.15 788.63 AMB POC URINALYSIS DIP STICK AUTO W/O MICRO      UA/M W/RFLX CULTURE, ROUTINE       Jacey Taylor was seen today for physical and immunization/injection. Diagnoses and all orders for this visit:    Routine general medical examination at a health care facility  -     METABOLIC PANEL, COMPREHENSIVE  -     CBC WITH AUTOMATED DIFF  -     HEMOGLOBIN A1C WITH EAG    Essential hypertension, newly diagnosed  Her SBP has been over 140 on more than 3 occasions over the past month. -     Start amLODIPine (NORVASC) 2.5 mg tablet; Take 1 Tab by mouth daily. Indications: hypertension    History of acute pancreatitis  -     LIPASE    Mixed hyperlipidemia  Continue atorvastatin 10 mg daily.  -     LIPID PANEL  -     TSH AND FREE T4    Breast cancer, stage 1, unspecified laterality  Continued Arimidex. Osteoporosis, unspecified osteoporosis type, unspecified pathological fracture presence  Continue Prolia.     Encounter for immunization  -     Pneumococcal conjugate 13 valent, IM (PREVNAR-13)  -     FL IMMUNIZ ADMIN,1 SINGLE/COMB VAC/TOXOID    Urinary urgency  Urine dip returned normal.  -     AMB POC URINALYSIS DIP STICK AUTO W/O MICRO  -     UA/M W/RFLX CULTURE, ROUTINE      Follow-up Disposition:  Return in about 2 months (around 8/7/2017), or if symptoms worsen or fail to improve, for BP check. Provided patient and/or family with advanced directive information and answered pertinent questions. Encouraged patient to provide a copy of advanced directive to the office when available. I have discussed the diagnosis with the patient and the intended plan as seen in the above orders. Patient is in agreement. The patient has received an after-visit summary and questions were answered concerning future plans. I have discussed medication side effects and warnings with the patient as well.

## 2017-06-08 LAB
ALBUMIN SERPL-MCNC: 4.5 G/DL (ref 3.5–4.8)
ALBUMIN/GLOB SERPL: 1.7 {RATIO} (ref 1.2–2.2)
ALP SERPL-CCNC: 52 IU/L (ref 39–117)
ALT SERPL-CCNC: 12 IU/L (ref 0–32)
APPEARANCE UR: CLEAR
AST SERPL-CCNC: 14 IU/L (ref 0–40)
BACTERIA #/AREA URNS HPF: NORMAL /[HPF]
BASOPHILS # BLD AUTO: 0 X10E3/UL (ref 0–0.2)
BASOPHILS NFR BLD AUTO: 1 %
BILIRUB SERPL-MCNC: 0.5 MG/DL (ref 0–1.2)
BILIRUB UR QL STRIP: NEGATIVE
BUN SERPL-MCNC: 17 MG/DL (ref 8–27)
BUN/CREAT SERPL: 21 (ref 12–28)
CALCIUM SERPL-MCNC: 9.6 MG/DL (ref 8.7–10.3)
CASTS URNS QL MICRO: NORMAL /LPF
CHLORIDE SERPL-SCNC: 106 MMOL/L (ref 96–106)
CHOLEST SERPL-MCNC: 163 MG/DL (ref 100–199)
CO2 SERPL-SCNC: 24 MMOL/L (ref 18–29)
COLOR UR: YELLOW
CREAT SERPL-MCNC: 0.81 MG/DL (ref 0.57–1)
EOSINOPHIL # BLD AUTO: 0.3 X10E3/UL (ref 0–0.4)
EOSINOPHIL NFR BLD AUTO: 4 %
EPI CELLS #/AREA URNS HPF: NORMAL /HPF
ERYTHROCYTE [DISTWIDTH] IN BLOOD BY AUTOMATED COUNT: 13.9 % (ref 12.3–15.4)
EST. AVERAGE GLUCOSE BLD GHB EST-MCNC: 108 MG/DL
GLOBULIN SER CALC-MCNC: 2.6 G/DL (ref 1.5–4.5)
GLUCOSE SERPL-MCNC: 89 MG/DL (ref 65–99)
GLUCOSE UR QL: NEGATIVE
HBA1C MFR BLD: 5.4 % (ref 4.8–5.6)
HCT VFR BLD AUTO: 35.3 % (ref 34–46.6)
HDLC SERPL-MCNC: 75 MG/DL
HGB BLD-MCNC: 12 G/DL (ref 11.1–15.9)
HGB UR QL STRIP: NEGATIVE
IMM GRANULOCYTES # BLD: 0 X10E3/UL (ref 0–0.1)
IMM GRANULOCYTES NFR BLD: 0 %
INTERPRETATION, 910389: NORMAL
KETONES UR QL STRIP: NEGATIVE
LDLC SERPL CALC-MCNC: 71 MG/DL (ref 0–99)
LEUKOCYTE ESTERASE UR QL STRIP: NEGATIVE
LIPASE SERPL-CCNC: 52 U/L (ref 0–59)
LYMPHOCYTES # BLD AUTO: 1.9 X10E3/UL (ref 0.7–3.1)
LYMPHOCYTES NFR BLD AUTO: 31 %
MCH RBC QN AUTO: 30.6 PG (ref 26.6–33)
MCHC RBC AUTO-ENTMCNC: 34 G/DL (ref 31.5–35.7)
MCV RBC AUTO: 90 FL (ref 79–97)
MICRO URNS: NORMAL
MICRO URNS: NORMAL
MONOCYTES # BLD AUTO: 0.8 X10E3/UL (ref 0.1–0.9)
MONOCYTES NFR BLD AUTO: 12 %
MUCOUS THREADS URNS QL MICRO: PRESENT
NEUTROPHILS # BLD AUTO: 3.2 X10E3/UL (ref 1.4–7)
NEUTROPHILS NFR BLD AUTO: 52 %
NITRITE UR QL STRIP: NEGATIVE
PH UR STRIP: 5.5 [PH] (ref 5–7.5)
PLATELET # BLD AUTO: 222 X10E3/UL (ref 150–379)
POTASSIUM SERPL-SCNC: 4.1 MMOL/L (ref 3.5–5.2)
PROT SERPL-MCNC: 7.1 G/DL (ref 6–8.5)
PROT UR QL STRIP: NEGATIVE
RBC # BLD AUTO: 3.92 X10E6/UL (ref 3.77–5.28)
RBC #/AREA URNS HPF: NORMAL /HPF
SODIUM SERPL-SCNC: 147 MMOL/L (ref 134–144)
SP GR UR: 1.02 (ref 1–1.03)
T4 FREE SERPL-MCNC: 1.24 NG/DL (ref 0.82–1.77)
TRIGL SERPL-MCNC: 84 MG/DL (ref 0–149)
TSH SERPL DL<=0.005 MIU/L-ACNC: 1.13 UIU/ML (ref 0.45–4.5)
URINALYSIS REFLEX, 377202: NORMAL
UROBILINOGEN UR STRIP-MCNC: 0.2 MG/DL (ref 0.2–1)
VLDLC SERPL CALC-MCNC: 17 MG/DL (ref 5–40)
WBC # BLD AUTO: 6.2 X10E3/UL (ref 3.4–10.8)
WBC #/AREA URNS HPF: NORMAL /HPF

## 2017-06-12 NOTE — PROGRESS NOTES
Your recent labs showed normal kidney and liver tests, blood counts, thyroid, diabetes screening test, cholesterol, and urine test. Your pancreas blood test, called lipase, was normal. Your sodium level was a little high. Drinking plenty of water should improve your sodium level.

## 2017-07-26 ENCOUNTER — OFFICE VISIT (OUTPATIENT)
Dept: INTERNAL MEDICINE CLINIC | Age: 72
End: 2017-07-26

## 2017-07-26 VITALS
TEMPERATURE: 98 F | SYSTOLIC BLOOD PRESSURE: 119 MMHG | HEART RATE: 72 BPM | HEIGHT: 63 IN | DIASTOLIC BLOOD PRESSURE: 70 MMHG | WEIGHT: 120 LBS | OXYGEN SATURATION: 98 % | BODY MASS INDEX: 21.26 KG/M2 | RESPIRATION RATE: 16 BRPM

## 2017-07-26 DIAGNOSIS — I10 ESSENTIAL HYPERTENSION: Primary | ICD-10-CM

## 2017-07-26 NOTE — PATIENT INSTRUCTIONS

## 2017-07-26 NOTE — PROGRESS NOTES
CC:   Chief Complaint   Patient presents with    Blood Pressure Check       HISTORY OF PRESENT ILLNESS  Augie Hodges is a 71 y.o. female. Presents for follow up on BP. She has a history of breast cancer in 2010 (follows with Dr. Valentino Donahue every 6 mons), osteoporosis on Prolia infusions, hyperlipidemia, history of ocular migraine headaches, diverticulosis, and eczema. She has no complaints. Was started on amlodipine 2.5 mg daily for persistent mild elevation of BP (145/67 at last visit). Tolerating well with no side effects. Denies HA, constipation, CP, SOB, or leg swelling. She will retire from work next week; last day is 7/31/17. Patient Active Problem List   Diagnosis Code    [facility] C50.919    Hyperlipemia E78.5    Osteoporosis M81.0    Ocular migraine G43. 109    Diverticulosis of colon K57.30    Eczema L30.9    Advance directive on file Z78.9    Mixed hyperlipidemia E78.2    Pancreatitis K85.90     Past Medical History:   Diagnosis Date    breast cancer May 2010    R breast, stage IA; T1b,pN0, M0; Dr. Caty Delgado    Hypercholesterolemia     Osteoporosis      Allergies   Allergen Reactions    Prilosec [Omeprazole] Other (comments)     Leg cramps    Zinc Swelling     Jewelry     Current Outpatient Prescriptions   Medication Sig Dispense Refill    amLODIPine (NORVASC) 2.5 mg tablet Take 1 Tab by mouth daily. Indications: hypertension 30 Tab 2    atorvastatin (LIPITOR) 10 mg tablet TAKE 1 TABLET DAILY 90 Tab 3    CRANBERRY FRUIT EXTRACT (CRANBERRY CONCENTRATE PO) Take 600 mg by mouth.  Biotin 2,500 mcg cap Take 5,000 mcg by mouth daily.  ranitidine (ACID REDUCER) 75 mg tablet Take 75 mg by mouth daily.  Denosumab (PROLIA) 60 mg/mL injection 60 mg by SubCUTAneous route. every 6 months      anastrozole (ARIMIDEX) 1 mg tablet Take 1 Tab by mouth daily.            PHYSICAL EXAM  Visit Vitals    /70 (BP 1 Location: Left arm, BP Patient Position: Sitting)  Pulse 72    Temp 98 °F (36.7 °C) (Oral)    Resp 16    Ht 5' 3\" (1.6 m)    Wt 120 lb (54.4 kg)    SpO2 98%    BMI 21.26 kg/m2       General: Well-developed and well-nourished, no distress. HEENT:  Head normocephalic/atraumatic, no scleral icterus  Lungs:  Clear to ausculation bilaterally. Good air movement. Heart:  Regular rate and rhythm, normal S1 and S2, no murmur, gallop, or rub  Extremities: No clubbing, cyanosis, or edema. Neurological: Alert and oriented. Psychiatric: Normal mood and affect. Behavior is normal.       ASSESSMENT AND PLAN    ICD-10-CM ICD-9-CM    1. Essential hypertension I10 401.9        Diagnoses and all orders for this visit:    1. Essential hypertension  /70. Well-controlled. Continue amlodipine 2.5 mg daily. Follow-up Disposition:  Return in about 6 months (around 1/26/2018), or if symptoms worsen or fail to improve, for 6 mon FU. Provided patient and/or family with advanced directive information and answered pertinent questions. Encouraged patient to provide a copy of advanced directive to the office when available. I have discussed the diagnosis with the patient and the intended plan as seen in the above orders. Patient is in agreement. The patient has received an after-visit summary and questions were answered concerning future plans. I have discussed medication side effects and warnings with the patient as well.

## 2017-07-26 NOTE — MR AVS SNAPSHOT
Visit Information Date & Time Provider Department Dept. Phone Encounter #  
 7/26/2017  7:45 AM Donnell Hartmann MD [facility] [phone] [id number] Follow-up Instructions Return in about 6 months (around 1/26/2018), or if symptoms worsen or fail to improve, for 6 mon FU. Upcoming Health Maintenance Date Due  
 GLAUCOMA SCREENING Q2Y 9/9/2016 INFLUENZA AGE 9 TO ADULT 8/1/2017 COLONOSCOPY 4/30/2018 BREAST CANCER SCRN MAMMOGRAM 6/6/2018 MEDICARE YEARLY EXAM 6/8/2018 DTaP/Tdap/Td series (3 - Td) 5/9/2026 Allergies as of 7/26/2017  Review Complete On: 7/26/2017 By: Donnell Hartmann MD  
  
 Severity Noted Reaction Type Reactions Prilosec [Omeprazole]  02/07/2011    Other (comments) Leg cramps Zinc  07/17/2015    Swelling Jewelry Current Immunizations  Reviewed on 6/7/2017 Name Date Influenza High Dose Vaccine PF 10/12/2015, 10/9/2014, 10/3/2013 Pneumococcal Conjugate (PCV-13) 6/7/2017 TDAP Vaccine 6/6/2006 Td, Adsorbed PF 5/9/2016 ZZZ-RETIRED (DO NOT USE) Pneumococcal Vaccine (Unspecified Type) 9/21/2011 Zoster 10/28/2011 Not reviewed this visit You Were Diagnosed With   
  
 Codes Comments Essential hypertension    -  Primary ICD-10-CM: I10 
ICD-9-CM: 401.9 Vitals BP Pulse Temp Resp Height(growth percentile) Weight(growth percentile) 119/70 (BP 1 Location: Left arm, BP Patient Position: Sitting) 72 98 °F (36.7 °C) (Oral) 16 5' 3\" (1.6 m) 120 lb (54.4 kg) SpO2 BMI OB Status Smoking Status 98% 21.26 kg/m2 Postmenopausal Never Smoker BMI and BSA Data Body Mass Index Body Surface Area  
 21.26 kg/m 2 1.56 m 2 Preferred Pharmacy Pharmacy Name Phone [facility] [address] AT [address] [place] [address] Drive [phone] Your Updated Medication List  
  
   
 This list is accurate as of: 7/26/17  7:57 AM.  Always use your most recent med list.  
  
  
  
  
 Acid Reducer 75 mg tablet Generic drug:  raNITIdine Take 75 mg by mouth daily. amLODIPine 2.5 mg tablet Commonly known as:  Unknown [place] Take 1 Tab by mouth daily. Indications: hypertension  
  
 anastrozole 1 mg tablet Commonly known as:  ARIMIDEX Take 1 Tab by mouth daily. atorvastatin 10 mg tablet Commonly known as:  LIPITOR  
TAKE 1 TABLET DAILY Biotin 2,500 mcg Cap Take 5,000 mcg by mouth daily. CRANBERRY CONCENTRATE PO Take 600 mg by mouth. PROLIA 60 mg/mL injection Generic drug:  denosumab 60 mg by SubCUTAneous route. every 6 months Follow-up Instructions Return in about 6 months (around 1/26/2018), or if symptoms worsen or fail to improve, for 6 mon FU. Patient Instructions High Blood Pressure: Care Instructions Your Care Instructions If your blood pressure is usually above 140/90, you have high blood pressure, or hypertension. That means the top number is 140 or higher or the bottom number is 90 or higher, or both. Despite what a lot of people think, high blood pressure usually doesn't cause headaches or make you feel dizzy or lightheaded. It usually has no symptoms. But it does increase your risk for heart attack, stroke, and kidney or eye damage. The higher your blood pressure, the more your risk increases. Your doctor will give you a goal for your blood pressure. Your goal will be based on your health and your age. An example of a goal is to keep your blood pressure below 140/90. Lifestyle changes, such as eating healthy and being active, are always important to help lower blood pressure. You might also take medicine to reach your blood pressure goal. 
Follow-up care is a key part of your treatment and safety.  Be sure to make and go to all appointments, and call your doctor if you are having problems. It's also a good idea to know your test results and keep a list of the medicines you take. How can you care for yourself at home? Medical treatment · If you stop taking your medicine, your blood pressure will go back up. You may take one or more types of medicine to lower your blood pressure. Be safe with medicines. Take your medicine exactly as prescribed. Call your doctor if you think you are having a problem with your medicine. · Talk to your doctor before you start taking aspirin every day. Aspirin can help certain people lower their risk of a heart attack or stroke. But taking aspirin isn't right for everyone, because it can cause serious bleeding. · See your doctor regularly. You may need to see the doctor more often at first or until your blood pressure comes down. · If you are taking blood pressure medicine, talk to your doctor before you take decongestants or anti-inflammatory medicine, such as ibuprofen. Some of these medicines can raise blood pressure. · Learn how to check your blood pressure at home. Lifestyle changes · Stay at a healthy weight. This is especially important if you put on weight around the waist. Losing even 10 pounds can help you lower your blood pressure. · If your doctor recommends it, get more exercise. Walking is a good choice. Bit by bit, increase the amount you walk every day. Try for at least 30 minutes on most days of the week. You also may want to swim, bike, or do other activities. · Avoid or limit alcohol. Talk to your doctor about whether you can drink any alcohol. · Try to limit how much sodium you eat to less than 2,300 milligrams (mg) a day. Your doctor may ask you to try to eat less than 1,500 mg a day. · Eat plenty of fruits (such as bananas and oranges), vegetables, legumes, whole grains, and low-fat dairy products. · Lower the amount of saturated fat in your diet.  Saturated fat is found in animal products such as milk, cheese, and meat. Limiting these foods may help you lose weight and also lower your risk for heart disease. · Do not smoke. Smoking increases your risk for heart attack and stroke. If you need help quitting, talk to your doctor about stop-smoking programs and medicines. These can increase your chances of quitting for good. When should you call for help? Call 911 anytime you think you may need emergency care. This may mean having symptoms that suggest that your blood pressure is causing a serious heart or blood vessel problem. Your blood pressure may be over 180/110. For example, call 911 if: 
· You have symptoms of a heart attack. These may include: ¨ Chest pain or pressure, or a strange feeling in the chest. 
¨ Sweating. ¨ Shortness of breath. ¨ Nausea or vomiting. ¨ Pain, pressure, or a strange feeling in the back, neck, jaw, or upper belly or in one or both shoulders or arms. ¨ Lightheadedness or sudden weakness. ¨ A fast or irregular heartbeat. · You have symptoms of a stroke. These may include: 
¨ Sudden numbness, tingling, weakness, or loss of movement in your face, arm, or leg, especially on only one side of your body. ¨ Sudden vision changes. ¨ Sudden trouble speaking. ¨ Sudden confusion or trouble understanding simple statements. ¨ Sudden problems with walking or balance. ¨ A sudden, severe headache that is different from past headaches. · You have severe back or belly pain. Do not wait until your blood pressure comes down on its own. Get help right away. Call your doctor now or seek immediate care if: 
· Your blood pressure is much higher than normal (such as 180/110 or higher), but you don't have symptoms. · You think high blood pressure is causing symptoms, such as: ¨ Severe headache. ¨ Blurry vision. Watch closely for changes in your health, and be sure to contact your doctor if: · Your blood pressure measures 140/90 or higher at least 2 times. That means the top number is 140 or higher or the bottom number is 90 or higher, or both. · You think you may be having side effects from your blood pressure medicine. · Your blood pressure is usually normal, but it goes above normal at least 2 times. Where can you learn more? Go to http://scout-catalina.info/. Enter G958 in the search box to learn more about \"High Blood Pressure: Care Instructions. \" Current as of: August 8, 2016 Content Version: 11.3 © 6366-9079 [employer]. Care instructions adapted under license by [employer] (which disclaims liability or warranty for this information). If you have questions about a medical condition or this instruction, always ask your healthcare professional. [address] any warranty or liability for your use of this information. Introducing [facility]! Dear Josephine Casillas: Thank you for requesting a [employer] account. Our records indicate that you already have an active [employer] account. You can access your account anytime at [email or web address] video Did you know that you can access your hospital and ER discharge instructions at any time in [employer]? You can also review all of your test results from your hospital stay or ER visit. Additional Information If you have questions, please visit the Frequently Asked Questions section of the [employer] website at [email or web address] video/. Remember, [employer] is NOT to be used for urgent needs. For medical emergencies, dial 911. Now available from your iPhone and Android! Please provide this summary of care documentation to your next provider. Your primary care clinician is listed as Eduardo Ramírez. If you have any questions after today's visit, please call [phone].

## 2017-07-26 NOTE — PROGRESS NOTES
Reviewed record  In preparation for visit and have obtained necessary documentation. 1. Have you been to the ER, urgent care clinic since your last visit? Hospitalized since your last visit?no  2. Have you seen or consulted any other health care providers outside of the [address] [place] [address] since your last visit? Include any pap smears or colon screening. no  Advanced directives: on file  Patients vital signs discussed with physician. Had eye exam with Dr Michael Rosado.

## 2017-12-15 ENCOUNTER — OFFICE VISIT (OUTPATIENT)
Dept: NEUROLOGY | Age: 72
End: 2017-12-15

## 2017-12-15 VITALS
DIASTOLIC BLOOD PRESSURE: 84 MMHG | TEMPERATURE: 98 F | HEIGHT: 63 IN | HEART RATE: 73 BPM | SYSTOLIC BLOOD PRESSURE: 148 MMHG | OXYGEN SATURATION: 99 % | RESPIRATION RATE: 12 BRPM | WEIGHT: 124 LBS | BODY MASS INDEX: 21.97 KG/M2

## 2017-12-15 DIAGNOSIS — R25.3 FASCICULATIONS OF MUSCLE: Primary | ICD-10-CM

## 2017-12-15 DIAGNOSIS — R51.9 NEW ONSET OF HEADACHES AFTER AGE 50: ICD-10-CM

## 2017-12-15 NOTE — MR AVS SNAPSHOT
Visit Information Date & Time Provider Department Dept. Phone Encounter #  
 12/15/2017  2:30 PM Keegan Prakash MD [facility] Neurology [facility] [phone] [id number] Follow-up Instructions Return for After tests. Your Appointments 1/5/2018  7:50 AM  
ESTABLISHED PATIENT with MD Marissa Max [facility]) Appt Note: 6 month fu $0cp 07.26.17; 6 month fu $0cp - pcp out 1/26/18  
 [address] [facility] [address] [phone]  
  
   
 [address] Upcoming Health Maintenance Date Due Influenza Age 5 to Adult 8/1/2017 COLONOSCOPY 4/30/2018 MEDICARE YEARLY EXAM 6/8/2018 GLAUCOMA SCREENING Q2Y 3/27/2019 DTaP/Tdap/Td series (3 - Td) 5/9/2026 Allergies as of 12/15/2017  Review Complete On: 12/15/2017 By: Zay Blanco LPN Severity Noted Reaction Type Reactions Prilosec [Omeprazole]  02/07/2011    Other (comments) Leg cramps Zinc  07/17/2015    Swelling Jewelry Current Immunizations  Reviewed on 6/7/2017 Name Date Influenza High Dose Vaccine PF 10/12/2015, 10/9/2014, 10/3/2013 Pneumococcal Conjugate (PCV-13) 6/7/2017 TDAP Vaccine 6/6/2006 Td, Adsorbed PF 5/9/2016 ZZZ-RETIRED (DO NOT USE) Pneumococcal Vaccine (Unspecified Type) 9/21/2011 Zoster 10/28/2011 Not reviewed this visit You Were Diagnosed With   
  
 Codes Comments Fasciculations of muscle    -  Primary ICD-10-CM: R25.3 ICD-9-CM: 781.0 New onset of headaches after age 48     ICD-10-CM: R46 ICD-9-CM: 071. 0 Vitals BP Pulse Temp Resp Height(growth percentile) Weight(growth percentile) 148/84 (BP 1 Location: Left arm, BP Patient Position: Sitting) 73 98 °F (36.7 °C) (Oral) 12 5' 3\" (1.6 m) 124 lb (56.2 kg) SpO2 BMI OB Status Smoking Status 99% 21.97 kg/m2 Postmenopausal Never Smoker Vitals History BMI and BSA Data Body Mass Index Body Surface Area  
 21.97 kg/m 2 1.58 m 2 Preferred Pharmacy Pharmacy Name Phone [facility]  [address] [address] [phone] Your Updated Medication List  
  
   
This list is accurate as of: 12/15/17  3:00 PM.  Always use your most recent med list. amLODIPine 2.5 mg tablet Commonly known as:  Teresa Edward TAKE 1 TABLET BY MOUTH DAILY FOR HIGH BLOOD PRESSURE  
  
 anastrozole 1 mg tablet Commonly known as:  ARIMIDEX Take 1 Tab by mouth daily. atorvastatin 10 mg tablet Commonly known as:  LIPITOR  
TAKE 1 TABLET DAILY Follow-up Instructions Return for After tests. To-Do List   
 12/15/2017 Imaging:  CT HEAD WO CONT   
  
 12/15/2017 Neurology:  EMG LIMITED Patient Instructions Information Regarding Testing If you have physican order for a test or a medication denied by your insurance company, this does not mean the test or medication is not appropriate for you as that is a medical decision, not a decision to be made by an insurance company representative or by an [facility] physician who has not interviewed and examined you. This is a decision to be made between you and your physician. The denial of services is a contractual matter between you and your insurance company, not an issue between your physician and the insurance company. If your test or medication is denied, you can take the following steps to help resolve the issue: 1. File a complaint with the [facility] of Insurance regarding your insurance company's denial of services ordered for you. You can do this either by calling them directly or by completing an on-line complaint form on the [place]. This can be found at www.[place].gov 2.    Also file a formal complaint with your insurance company and ask to have the name of the person denying the service so that you may explore a legal option should you be harmed by this denial of service. Again, the fact the insurance company will not pay for the service does not mean it is not medically necessary and I would encourage you to follow through with the plan that was made with your physician 3. File a written complaint with your employer so your employer and benefit manager is aware of the poor coverage they are providing their employees. If you have medicare/medicaid, complain to your representative in the House and to your Serge Edgar. PRESCRIPTION REFILL POLICY [facility] Statement to Patients April 1, 2014 In an effort to ensure the large volume of patient prescription refills is processed in the most efficient and expeditious manner, we are asking our patients to assist us by calling your Pharmacy for all prescription refills, this will include also your  Mail Order Pharmacy. The pharmacy will contact our office electronically to continue the refill process. Please do not wait until the last minute to call your pharmacy. We need at least 48 hours (2days) to fill prescriptions. We also encourage you to call your pharmacy before going to  your prescription to make sure it is ready. With regard to controlled substance prescription refill requests (narcotic refills) that need to be picked up at our office, we ask your cooperation by providing us with at least 72 hours (3days) notice that you will need a refill. We will not refill narcotic prescription refill requests after 4:00pm on any weekday, Monday through Thursday, or after 2:00pm on Fridays, or on the weekends. We encourage everyone to explore another way of getting your prescription refill request processed using [employer], our patient web portal through our electronic medical record system.  [employer] is an efficient and effective way to communicate your medication request directly to the office and  downloadable as an ainsley on your smart phone . icomply also features a review functionality that allows you to view your medication list as well as leave messages for your physician. Are you ready to get connected? If so please review the attatched instructions or speak to any of our staff to get you set up right away! Thank you so much for your cooperation. Should you have any questions please contact our Practice Administrator. The Physicians and Staff,  [facility] Patient received copy of \"Your Right to Decide\" pamphlet and a healthcare advanced directive form as part of today's visitIf we have ordered testing for you, we do not call patients with results and we do not give test results over the phone. We schedule follow up appointments so that your results can be discussed in person and any questions you have regarding them may be addressed. If something of concern is revealed on your test, we will call you for a sooner follow up appointment. Additionally, results may be found by using the My Chart feature and one of our patient service representatives at the  can give you instructions on how to access this feature of our electronic medical record system. [address] V[address] What is a living will? A living will is a legal form you use to write down the kind of care you want at the end of your life. It is used by the health professionals who will treat you if you aren't able to decide for yourself. If you put your wishes in writing, your loved ones and others will know what kind of care you want. They won't need to guess. This can ease your mind and be helpful to others. A living will is not the same as an estate or property will. An estate will explains what you want to happen with your money and property after you die. Is a living will a legal document? A living will is a legal document. Each state has its own laws about living mccollum. If you move to another state, make sure that your living will is legal in the state where you now live. Or you might use a universal form that has been approved by many states. This kind of form can sometimes be completed and stored online. Your electronic copy will then be available wherever you have a connection to the Internet. In most cases, doctors will respect your wishes even if you have a form from a different state. · You don't need an  to complete a living will. But legal advice can be helpful if your state's laws are unclear, your health history is complicated, or your family can't agree on what should be in your living will. · You can change your living will at any time. Some people find that their wishes about end-of-life care change as their health changes. · In addition to making a living will, think about completing a medical power of  form. This form lets you name the person you want to make end-of-life treatment decisions for you (your \"health care agent\") if you're not able to. Many hospitals and nursing homes will give you the forms you need to complete a living will and a medical power of . · Your living will is used only if you can't make or communicate decisions for yourself anymore. If you become able to make decisions again, you can accept or refuse any treatment, no matter what you wrote in your living will. · Your state may offer an online registry. This is a place where you can store your living will online so the doctors and nurses who need to treat you can find it right away. What should you think about when creating a living will? Talk about your end-of-life wishes with your family members and your doctor. Let them know what you want. That way the people making decisions for you won't be surprised by your choices. Think about these questions as you make your living will: · Do you know enough about life support methods that might be used? If not, talk to your doctor so you know what might be done if you can't breathe on your own, your heart stops, or you're unable to swallow. · What things would you still want to be able to do after you receive life-support methods? Would you want to be able to walk? To speak? To eat on your own? To live without the help of machines? · If you have a choice, where do you want to be cared for? In your home? At a hospital or nursing home? · Do you want certain Buddhism practices performed if you become very ill? · If you have a choice at the end of your life, where would you prefer to die? At home? In a hospital or nursing home? Somewhere else? · Would you prefer to be buried or cremated? · Do you want your organs to be donated after you die? What should you do with your living will? · Make sure that your family members and your health care agent have copies of your living will. · Give your doctor a copy of your living will to keep in your medical record. If you have more than one doctor, make sure that each one has a copy. · You may want to put a copy of your living will where it can be easily found. Where can you learn more? Go to http://scout-catalina.info/. Enter G892 in the search box to learn more about \"Learning About Living Christoph. \" Current as of: September 24, 2016 Content Version: 11.4 © 8696-4622 Healthwise, Incorporated. Care instructions adapted under license by [employer] (which disclaims liability or warranty for this information). If you have questions about a medical condition or this instruction, always ask your healthcare professional. [address] any warranty or liability for your use of this information. Introducing [facility]! Dear Brown Richardson: Thank you for requesting a R[employer]ter account.   Our records indicate that you already have an active [employer] account. You can access your account anytime at [email or web address] [email or web address] [email or web address] Did you know that you can access your hospital and ER discharge instructions at any time in [employer]? You can also review all of your test results from your hospital stay or ER visit. Additional Information If you have questions, please visit the Frequently Asked Questions section of the [employer] website at [email or web address] Remember, [employer] is NOT to be used for urgent needs. For medical emergencies, dial 911. Now available from your iPhone and Android! Please provide this summary of care documentation to your next provider. Your primary care clinician is listed as Shraddha Smith. If you have any questions after today's visit, please call [phone].

## 2017-12-15 NOTE — PATIENT INSTRUCTIONS
Information Regarding Testing     If you have physican order for a test or a medication denied by your insurance company, this does not mean the test or medication is not appropriate for you as that is a medical decision, not a decision to be made by an insurance company representative or by an [facility] physician who has not interviewed and examined you. This is a decision to be made between you and your physician. The denial of services is a contractual matter between you and your insurance company, not an issue between your physician and the insurance company. If your test or medication is denied, you can take the following steps to help resolve the issue:    1. File a complaint with the [facility] regarding your insurance company's denial of services ordered for you. You can do this either by calling them directly or by completing an on-line complaint form on the [employer]. This can be found at www.Buggl    2. Also file a formal complaint with your insurance company and ask to have the name of the person denying the service so that you may explore a legal option should you be harmed by this denial of service. Again, the fact the insurance company will not pay for the service does not mean it is not medically necessary and I would encourage you to follow through with the plan that was made with your physician    3. File a written complaint with your employer so your employer and benefit manager is aware of the poor coverage they are providing their employees. If you have medicare/medicaid, complain to your representative in the House and to your Serge Edgar.         [facility]   Statement to Patients  April 1, 2014      In an effort to ensure the large volume of patient prescription refills is processed in the most efficient and expeditious manner, we are asking our patients to assist us by calling your Pharmacy for all prescription refills, this will include also your  Mail Order Pharmacy. The pharmacy will contact our office electronically to continue the refill process. Please do not wait until the last minute to call your pharmacy. We need at least 48 hours (2days) to fill prescriptions. We also encourage you to call your pharmacy before going to  your prescription to make sure it is ready. With regard to controlled substance prescription refill requests (narcotic refills) that need to be picked up at our office, we ask your cooperation by providing us with at least 72 hours (3days) notice that you will need a refill. We will not refill narcotic prescription refill requests after 4:00pm on any weekday, Monday through Thursday, or after 2:00pm on Fridays, or on the weekends. We encourage everyone to explore another way of getting your prescription refill request processed using [employer], our patient web portal through our electronic medical record system. [employer] is an efficient and effective way to communicate your medication request directly to the office and  downloadable as an ainsley on your smart phone . [employer] also features a review functionality that allows you to view your medication list as well as leave messages for your physician. Are you ready to get connected? If so please review the attatched instructions or speak to any of our staff to get you set up right away! Thank you so much for your cooperation. Should you have any questions please contact our Practice Administrator. The Physicians and Staff,  [facility]       Patient received copy of \"Your Right to Decide\" pamphlet and a healthcare advanced directive form as part of today's visitIf we have ordered testing for you, we do not call patients with results and we do not give test results over the phone.   We schedule follow up appointments so that your results can be discussed in person and any questions you have regarding them may be addressed. If something of concern is revealed on your test, we will call you for a sooner follow up appointment. Additionally, results may be found by using the My Chart feature and one of our patient service representatives at the  can give you instructions on how to access this feature of our electronic medical record system. Learning About Living Perroy  What is a living will? A living will is a legal form you use to write down the kind of care you want at the end of your life. It is used by the health professionals who will treat you if you aren't able to decide for yourself. If you put your wishes in writing, your loved ones and others will know what kind of care you want. They won't need to guess. This can ease your mind and be helpful to others. A living will is not the same as an estate or property will. An estate will explains what you want to happen with your money and property after you die. Is a living will a legal document? A living will is a legal document. Each state has its own laws about living mccollum. If you move to another state, make sure that your living will is legal in the state where you now live. Or you might use a universal form that has been approved by many states. This kind of form can sometimes be completed and stored online. Your electronic copy will then be available wherever you have a connection to the Internet. In most cases, doctors will respect your wishes even if you have a form from a different state. · You don't need an  to complete a living will. But legal advice can be helpful if your state's laws are unclear, your health history is complicated, or your family can't agree on what should be in your living will. · You can change your living will at any time. Some people find that their wishes about end-of-life care change as their health changes.   · In addition to making a living will, think about completing a medical power of  form. This form lets you name the person you want to make end-of-life treatment decisions for you (your \"health care agent\") if you're not able to. Many hospitals and nursing homes will give you the forms you need to complete a living will and a medical power of . · Your living will is used only if you can't make or communicate decisions for yourself anymore. If you become able to make decisions again, you can accept or refuse any treatment, no matter what you wrote in your living will. · Your state may offer an online registry. This is a place where you can store your living will online so the doctors and nurses who need to treat you can find it right away. What should you think about when creating a living will? Talk about your end-of-life wishes with your family members and your doctor. Let them know what you want. That way the people making decisions for you won't be surprised by your choices. Think about these questions as you make your living will:  · Do you know enough about life support methods that might be used? If not, talk to your doctor so you know what might be done if you can't breathe on your own, your heart stops, or you're unable to swallow. · What things would you still want to be able to do after you receive life-support methods? Would you want to be able to walk? To speak? To eat on your own? To live without the help of machines? · If you have a choice, where do you want to be cared for? In your home? At a hospital or nursing home? · Do you want certain Religion practices performed if you become very ill? · If you have a choice at the end of your life, where would you prefer to die? At home? In a hospital or nursing home? Somewhere else? · Would you prefer to be buried or cremated? · Do you want your organs to be donated after you die? What should you do with your living will?   · Make sure that your family members and your health care agent have copies of your living will. · Give your doctor a copy of your living will to keep in your medical record. If you have more than one doctor, make sure that each one has a copy. · You may want to put a copy of your living will where it can be easily found. Where can you learn more? Go to http://scout-catalina.info/. Enter H649 in the search box to learn more about \"Learning About Living Jordan Allen. \"  Current as of: September 24, 2016  Content Version: 11.4  © 7823-6221 Healthwise, Incorporated. Care instructions adapted under license by [employer] (which disclaims liability or warranty for this information). If you have questions about a medical condition or this instruction, always ask your healthcare professional. [address] any warranty or liability for your use of this information.

## 2017-12-15 NOTE — PROGRESS NOTES
Angella Carbajal is a 71 y.o. female      Chief Complaint   Patient presents with    Head Pain     new patient    Other     severe pain on back of the head on the right side before she falls asleep feels like a bee sting          1. Have you been to the ER, urgent care clinic since your last visit? Hospitalized since your last visit? No    2. Have you seen or consulted any other health care providers outside of the [address] [place] [address] since your last visit? Include any pap smears or colon screening.  No

## 2017-12-27 ENCOUNTER — OFFICE VISIT (OUTPATIENT)
Dept: NEUROLOGY | Age: 72
End: 2017-12-27

## 2018-01-03 ENCOUNTER — HOSPITAL ENCOUNTER (OUTPATIENT)
Dept: CT IMAGING | Age: 73
Discharge: HOME | End: 2018-01-03
Attending: PSYCHIATRY & NEUROLOGY
Payer: MEDICARE

## 2018-01-03 DIAGNOSIS — R25.3 FASCICULATIONS OF MUSCLE: ICD-10-CM

## 2018-01-03 DIAGNOSIS — R51.9 NEW ONSET OF HEADACHES AFTER AGE 50: ICD-10-CM

## 2018-01-03 PROCEDURE — 70450 CT HEAD/BRAIN W/O DYE: CPT

## 2018-01-05 ENCOUNTER — TELEPHONE (OUTPATIENT)
Dept: NEUROLOGY | Age: 73
End: 2018-01-05

## 2018-01-05 NOTE — TELEPHONE ENCOUNTER
----- Message from Treasure Galvez sent at 1/4/2018  4:34 PM EST -----  Regarding: /telephone  Pt is requesting a call back from the practice. Best contact [phone]

## 2018-01-08 NOTE — TELEPHONE ENCOUNTER
Patient is trying to schedule an EMG. She stated that she would like a call today but if she does hear anything today. She will come into office in person and schedule.

## 2018-01-11 ENCOUNTER — OFFICE VISIT (OUTPATIENT)
Dept: NEUROLOGY | Age: 73
End: 2018-01-11

## 2018-01-11 DIAGNOSIS — R25.3 FASCICULATIONS: Primary | ICD-10-CM

## 2018-01-11 NOTE — PROCEDURES
EMG/ NCS Report  [facility], [address]  Ph: [phone]/ [phone]  FAX: [phone]/ [phone]  Test Date:  2018    Patient: Angus Lamb : 1945 Physician: Roby Burnham MD   Sex: Male Height: ' \" Ref Phys: Wanda Brooke MD   ID#: [id number] Weight:  lbs. Technician: Laura Chase     Patient History / Exam:  CC:FASCULATION,SYMPTOMS X 1 YR. (-) Weakness, (-) Numbness (-) neck pain (-) lower back pain     Patient is coming for motor neuron evaluation. EMG & NCV Findings:  Evaluation of the left Fibular motor nerve showed normal distal onset latency (4.1 ms), normal amplitude (5.4 mV), normal conduction velocity (B Fib-Ankle, 46 m/s), and normal conduction velocity (Poplt-B Fib, 62 m/s). The left median motor nerve showed normal distal onset latency (3.0 ms), normal amplitude (9.6 mV), and normal conduction velocity (Elbow-Wrist, 53 m/s). The left tibial motor nerve showed normal distal onset latency (4.1 ms), normal amplitude (17.6 mV), and normal conduction velocity (Knee-Ankle, 46 m/s). The left ulnar motor nerve showed normal distal onset latency (2.7 ms), normal amplitude (8.9 mV), normal conduction velocity (B Elbow-Wrist, 61 m/s), and normal conduction velocity (A Elbow-B Elbow, 63 m/s). The left median sensory, the left radial sensory, the left sural sensory, and the left ulnar sensory nerves showed normal distal peak latency (L3.1, L1.8, L2.0, L3.3 ms) and normal amplitude (L65.5, L70.9, L11.1, L77.8 µV). All F Wave latencies were within normal limits. All examined muscles (as indicated in the following table) showed no evidence of electrical instability.           Impression:    Extensive electrodiagnostic examination of the right upper and right lower extremities is normal.    Specifically, there is no evidence of a peripheral neuropathy, cervical motor radiculopathy or lumbosacral motor radiculopathy on the right.    Incidental finding of a normal variant right accessory peroneal nerve is noted.           Sadie Roger MD  Diplomate, American Board of Psychiatry and Neurology  Diplomate, Neuromuscular Medicine  Diplomate, American Board of Electrodiagnostic Medicine          Nerve Conduction Studies  Anti Sensory Summary Table     Stim Site NR Peak (ms) Norm Peak (ms) P-T Amp (µV) Norm P-T Amp Site1 Site2 Dist (cm)   Left Median Anti Sensory (2nd Digit)  35.4°C   Wrist    3.1 <4 65.5 >13 Wrist 2nd Digit 14.0   Left Radial Anti Sensory (Base 1st Digit)  33°C   Wrist    1.8 <2.8 70.9 >11 Wrist Base 1st Digit 10.0   Left Sural Anti Sensory (Lat Mall)  31.4°C   Calf    2.0 <4.5 11.1 >4.0 Calf Lat Mall 14.0   Left Ulnar Anti Sensory (5th Digit)  34.3°C   Wrist    3.3 <4.0 77.8 >9 Wrist 5th Digit 14.0     Motor Summary Table     Stim Site NR Onset (ms) Norm Onset (ms) O-P Amp (mV) Norm O-P Amp Amp (Prev) (%) Site1 Site2 Dist (cm) Jason (m/s) Norm Jason (m/s)   Left Fibular Motor (Ext Dig Brev)  29.7°C   Ankle    4.1 <6.5 5.4 >1.1 100.0 Ankle Ext Dig Brev 8.0     B Fib    10.2  9.2  170.4 B Fib Ankle 28.0 46 >38   Poplt    11.8  9.0  97.8 Poplt B Fib 10.0 62 >42   Lat ankle    4.8  3.1  34.4        Left Median Motor (Abd Poll Brev)  30.5°C   Wrist    3.0 <4.5 9.6 >4.1 100.0 Wrist Abd Poll Brev 8.0     Elbow    6.6  9.3  96.9 Elbow Wrist 19.0 53 >49   Left Tibial Motor (Abd Fan Brev)  32.7°C   Ankle    4.1 <6.1 17.6 >1.1 100.0 Ankle Abd Fan Brev 80.0     Knee    11.7  14.0  79.5 Knee Ankle 35.0 46 >39   Left Ulnar Motor (Abd Dig Minimi)  31.4°C   Wrist    2.7 <3.1 8.9 >7.0 100.0 Wrist Abd Dig Minimi 8.0  >50   B Elbow    5.9  8.9  100.0 B Elbow Wrist 19.5 61 >50   A Elbow    7.5  8.7  97.8 A Elbow B Elbow 10.0 63 >50     F Wave Studies     NR F-Lat (ms) Lat Norm (ms) L-R F-Lat (ms) L-R Lat Norm   Left Tibial (Mrkrs) (Abd Hallucis)  34.1°C      40.28 <56  <5.7   Left Ulnar (Mrkrs) (Abd Dig Min)  30.2°C      25.73 <32  <2.5     H Reflex Studies     NR H-Lat (ms) L-R H-Lat (ms) L-R Lat Norm   Left Tibial (Gastroc)  32.4°C      34.00  <2.0     EMG     Side Muscle Nerve Root Ins Act Fibs Psw Recrt Duration Amp Poly Comment   Left Ext Dig Brev Dp Br Peron L5, S1 Nml Nml Nml Nml Nml Nml Nml    Left AbdHallucis MedPlantar S1-2 Nml Nml Nml Nml Nml Nml Nml    Left AntTibialis Dp Br Peron L4-5 Nml Nml Nml Nml Nml Nml Nml    Left MedGastroc Tibial S1-2 Nml Nml Nml Nml Nml Nml Nml    Left VastusLat Femoral L2-4 Nml Nml Nml Nml Nml Nml Nml    Left 1stDorInt Ulnar C8-T1 Nml Nml Nml Nml Nml Nml Nml    Left ExtIndicis Radial (Post Int) C7-8 Nml Nml Nml Nml Nml Nml Nml    Left Abd Poll Brev Median C8-T1 Nml Nml Nml Nml Nml Nml Nml    Left Biceps Musculocut C5-6 Nml Nml Nml Nml Nml Nml Nml    Left Triceps Radial C6-7-8 Nml Nml Nml Nml Nml Nml Nml    Left Deltoid Axillary C5-6 Nml Nml Nml Nml Nml Nml Nml    Left Lower Cerv Parasp Rami C7,T1 Nml Nml Nml Nml Nml Nml Nml    Left Lower Lumb Parasp Rami L5,S1 Nml Nml Nml Nml Nml Nml Nml                Nerve Conduction Studies  Anti Sensory Left/Right Comparison     Stim Site L Lat (ms) R Lat (ms) L-R Lat (ms) L Amp (µV) R Amp (µV) L-R Amp (%) Site1 Site2 L Jason (m/s) R Jason (m/s) L-R Jason (m/s)   Median Anti Sensory (2nd Digit)  35.4°C   Wrist 2.4   65.5   Wrist 2nd Digit 58     Radial Anti Sensory (Base 1st Digit)  33°C   Wrist 1.3   70.9   Wrist Base 1st Digit 77     Sural Anti Sensory (Lat Mall)  31.4°C   Calf 2.6   11.1   Calf Lat Mall 54     Ulnar Anti Sensory (5th Digit)  34.3°C   Wrist 2.7   77.8   Wrist 5th Digit 52       Motor Left/Right Comparison     Stim Site L Lat (ms) R Lat (ms) L-R Lat (ms) L Amp (mV) R Amp (mV) L-R Amp (%) Site1 Site2 L Jason (m/s) R Jason (m/s) L-R Jason (m/s)   Fibular Motor (Ext Dig Brev)  29.7°C   Ankle 4.1   5.4   Ankle Ext Dig Brev      B Fib 10.2   9.2   B Fib Ankle 46     Poplt 11.8   9.0   Poplt B Fib 62     Lat ankle 4.8   3.1          Median Motor (Abd Poll Brev)  30.5°C   Wrist 3.0   9.6   Wrist Abd Poll Brev      Elbow 6.6   9.3   Elbow Wrist 53     Tibial Motor (Abd Fan Brev)  32.7°C   Ankle 4.1   17.6   Ankle Abd Fan Brev      Knee 11.7   14.0   Knee Ankle 46     Ulnar Motor (Abd Dig Minimi)  31.4°C   Wrist 2.7   8.9   Wrist Abd Dig Minimi      B Elbow 5.9   8.9   B Elbow Wrist 61     A Elbow 7.5   8.7   A Elbow B Elbow 63           Waveforms:

## 2018-01-11 NOTE — TELEPHONE ENCOUNTER
----- Message from Polina Rojsa LPN sent at 4/0/2273  8:46 AM EST -----  Regarding: FW:Artemionena After Visit Follow-Up Message. Contact: [phone]      ----- Message -----     From: Sharon Samaniego     Sent: 1/8/2018  11:01 PM       To: Dorota Chan Pool  Subject: Virginia Guillermo After Visit Follow-Up Message. I have tried to schedule EMG at your office - cannot get appt - saw Dr. Kandee Peabody on December 15 - not sure how long the insurance is valid with three weeks past - called main number every day - Dec. 20th tech sick - Dec. 27th 3 hrs in waiting room. one person in front of me.he never came out and at 4 pm I had to reschedule - Walker 4th snow I called immediately and every day since - have not been able to reschedule - Javier Clifford was suppose to call me back today - was in office - no call. Not sure what to do - I have my cell at all times. Got CT scan My appt with PA is Jan. 26th - no need if I cannot get this test.    Yoli Rizvi [birth year]  ----- Message -----  From: Ibeth Rae MD  Sent: 1/3/2018 12:00 AM EST  To: Rickie Carrillo  Subject: [facility] After Visit Follow-Up Message. Raquel Ms. Chino Zita you for your recent visit to [facility]. Your health is important to us and we are privileged to be your healthcare provider and partner. If you have follow-up questions regarding your treatment plan from todays visit, please contact us through the [facility] Patient Portal by replying to this message. In the near future, you may receive a survey about your experience with us. We hope you will take the time to let us know how we did so we can continue to improve the care you receive.       Thank you,    [facility]

## 2018-01-26 ENCOUNTER — OFFICE VISIT (OUTPATIENT)
Dept: NEUROLOGY | Age: 73
End: 2018-01-26

## 2018-01-26 VITALS
HEART RATE: 75 BPM | SYSTOLIC BLOOD PRESSURE: 124 MMHG | DIASTOLIC BLOOD PRESSURE: 66 MMHG | HEIGHT: 63 IN | TEMPERATURE: 98.1 F | RESPIRATION RATE: 16 BRPM | OXYGEN SATURATION: 98 %

## 2018-01-26 DIAGNOSIS — R25.3 BENIGN FASCICULATIONS: Primary | ICD-10-CM

## 2018-01-26 DIAGNOSIS — M54.81 OCCIPITAL NEURALGIA OF LEFT SIDE: ICD-10-CM

## 2018-01-26 NOTE — PROGRESS NOTES
Date:  18     Name:  Chad Fitzgerald  :  1945  MRN:  [id number]     PCP:  Ajith Daugherty MD    Chief Complaint   Patient presents with    Results     HISTORY OF PRESENT ILLNESS: Follow up for muscle fasciculations and headache. EMG and CT scan were both normal.  She does continue to have some mild muscle fasciculations that she can feel as well as some mild headache on the left side. This is largely a sharp pain that is brief and sometimes will leave her with a lingering left-sided headache. This only occurs periodically. It is not bothersome enough for her to wish to take anything for it. Except as noted above, denies  fever, chills, cough. No CP or SOB. No dysuria, loss of bowel or bladder control. No Weight loss. Appetite good. Sleeping well. No sweats. No edema. No bruising or bleeding. No nausea or vomit. No diarrhea. No frequency, urgency, No depressive sxs. No anxiety. Denies sore throat, nasal congestion, nasal discharge, epistaxis, tinnitus, hearing loss, back pain, muscle pain, or joint pain. Current Outpatient Prescriptions   Medication Sig    amLODIPine (NORVASC) 2.5 mg tablet TAKE 1 TABLET BY MOUTH DAILY FOR HIGH BLOOD PRESSURE    atorvastatin (LIPITOR) 10 mg tablet TAKE 1 TABLET DAILY    anastrozole (ARIMIDEX) 1 mg tablet Take 1 Tab by mouth daily. No current facility-administered medications for this visit.       Allergies   Allergen Reactions    Prilosec [Omeprazole] Other (comments)     Leg cramps    Zinc Swelling     Jewelry     Past Medical History:   Diagnosis Date    Breast CA [facility])     breast cancer May 2010    R breast, stage IA; T1b,pN0, M0; Dr. Nieves Lamar    Hypercholesterolemia     Osteoporosis     Pancreatitis      Past Surgical History:   Procedure Laterality Date    HX BREAST LUMPECTOMY       Social History     Social History    Marital status:      Spouse name: N/A    Number of children: N/A    Years of education: N/A     Occupational History    Not on file. Social History Main Topics    Smoking status: Never Smoker    Smokeless tobacco: Never Used    Alcohol use 3.5 oz/week     7 Glasses of wine per week    Drug use: Not on file    Sexual activity: Not on file     Other Topics Concern    Not on file     Social History Narrative     Family History   Problem Relation Age of Onset    Osteoporosis Mother     Heart Failure Mother     Heart Disease Mother     Colon Cancer Father     Cancer Father      lung & colon    Cancer Sister      skin       PHYSICAL EXAMINATION:    Visit Vitals    /66    Pulse 75    Temp 98.1 °F (36.7 °C) (Oral)    Resp 16    Ht 5' 3\" (1.6 m)    SpO2 98%     General:  Well defined, nourished, and groomed individual in no acute distress. Neck: Supple, nontender, no bruits, no pain with resistance to active range of motion. Heart: Regular rate and rhythm, no murmurs, rub, or gallop. Normal S1S2. Lungs:  Clear to auscultation bilaterally with equal chest expansion, no cough, no wheeze  Musculoskeletal:  Extremities revealed no edema and had full range of motion of joints. Psych:  Good mood and bright affect    NEUROLOGICAL EXAMINATION:     Mental Status:   Alert and oriented to person, place, and time with recent and remote memory intact. Attention span and concentration are normal. Speech is fluent with a full fund of knowledge. Cranial Nerves:    II, III, IV, VI:  Visual acuity grossly intact. Visual fields are normal.    Pupils are equal, round, and reactive to light and accommodation. Extra-ocular movements are full and fluid. Fundoscopic exam was benign, no ptosis or nystagmus. V-XII: Hearing is grossly intact. Facial features are symmetric, with normal sensation and strength. The palate rises symmetrically and the tongue protrudes midline. Sternocleidomastoids 5/5.       Motor Examination: Normal tone, bulk, and strength, 5/5 muscle strength throughout. Coordination:  Finger to nose and rapid arm movement testing was normal.   No resting or intention tremor    Gait and Station:  Steady while walking. Normal arm swing. No pronator drift. No muscle wasting or fasiculations noted. Reflexes:  DTRs 2+ throughout. ASSESSMENT AND PLAN    ICD-10-CM ICD-9-CM    1. Benign fasciculations R25.3 781.0    2. Occipital neuralgia of left side M54.81 723.8      Discussed her results which were normal.  Discussed diagnosis of benign fasciculations and occipital neuralgia of the left side. She was given the opportunity to ask questions. In our discussion, she indicates that the symptoms are not bothersome to initiate any type of therapy. Follow-up in neurology as needed. Anais Castillo

## 2018-01-26 NOTE — MR AVS SNAPSHOT
[address] [place] [address] [address] [address] [phone] Patient: Gold Pulido MRN:  [id number] Visit Information Date & Time Provider Department Dept. Phone Encounter #  
 1/26/2018  1:00 PM Luiza Capellan NP [facility] [phone] [id number] Follow-up Instructions Return if symptoms worsen or fail to improve. Your Appointments 2/21/2018  2:00 PM  
ESTABLISHED PATIENT with MD Carmen Mack [facility]) Appt Note: 6 month f/u/$25CP KMP 1/4/18  
 [address] [facility] [address] [phone]  
  
   
 [address] Upcoming Health Maintenance Date Due Influenza Age 5 to Adult 8/1/2017 COLONOSCOPY 4/30/2018 BREAST CANCER SCRN MAMMOGRAM 6/6/2018 MEDICARE YEARLY EXAM 6/8/2018 GLAUCOMA SCREENING Q2Y 3/27/2019 DTaP/Tdap/Td series (3 - Td) 5/9/2026 Allergies as of 1/26/2018  Review Complete On: 1/26/2018 By: Luiza Capellan NP Severity Noted Reaction Type Reactions Prilosec [Omeprazole]  02/07/2011    Other (comments) Leg cramps Zinc  07/17/2015    Swelling Jewelry Current Immunizations  Reviewed on 6/7/2017 Name Date Influenza High Dose Vaccine PF 10/12/2015, 10/9/2014, 10/3/2013 Pneumococcal Conjugate (PCV-13) 6/7/2017 TDAP Vaccine 6/6/2006 Td, Adsorbed PF 5/9/2016 ZZZ-RETIRED (DO NOT USE) Pneumococcal Vaccine (Unspecified Type) 9/21/2011 Zoster 10/28/2011 Not reviewed this visit You Were Diagnosed With   
  
 Codes Comments Benign fasciculations    -  Primary ICD-10-CM: R25.3 ICD-9-CM: 781.0 Occipital neuralgia of left side     ICD-10-CM: M54.81 ICD-9-CM: 723.8 Vitals BP Pulse Temp Resp Height(growth percentile) SpO2  
 124/66 75 98.1 °F (36.7 °C) (Oral) 16 5' 3\" (1.6 m) 98% OB Status Smoking Status Postmenopausal Never Smoker Vitals History Preferred Pharmacy Pharmacy Name Phone [facility], [address] AT [facility] [facility] [address] [phone] Your Updated Medication List  
  
   
This list is accurate as of: 1/26/18  1:42 PM.  Always use your most recent med list. amLODIPine 2.5 mg tablet Commonly known as:  Lissette Glatter TAKE 1 TABLET BY MOUTH DAILY FOR HIGH BLOOD PRESSURE  
  
 anastrozole 1 mg tablet Commonly known as:  ARIMIDEX Take 1 Tab by mouth daily. atorvastatin 10 mg tablet Commonly known as:  LIPITOR  
TAKE 1 TABLET DAILY Follow-up Instructions Return if symptoms worsen or fail to improve. Patient Instructions PRESCRIPTION REFILL POLICY [facility] Statement to Patients April 1, 2014 In an effort to ensure the large volume of patient prescription refills is processed in the most efficient and expeditious manner, we are asking our patients to assist us by calling your Pharmacy for all prescription refills, this will include also your  Mail Order Pharmacy. The pharmacy will contact our office electronically to continue the refill process. Please do not wait until the last minute to call your pharmacy. We need at least 48 hours (2days) to fill prescriptions. We also encourage you to call your pharmacy before going to  your prescription to make sure it is ready. With regard to controlled substance prescription refill requests (narcotic refills) that need to be picked up at our office, we ask your cooperation by providing us with at least 72 hours (3days) notice that you will need a refill. We will not refill narcotic prescription refill requests after 4:00pm on any weekday, Monday through Thursday, or after 2:00pm on Fridays, or on the weekends. We encourage everyone to explore another way of getting your prescription refill request processed using [employer], our patient web portal through our electronic medical record system. [employer] is an efficient and effective way to communicate your medication request directly to the office and  downloadable as an ainsley on your smart phone . [employer] also features a review functionality that allows you to view your medication list as well as leave messages for your physician. Are you ready to get connected? If so please review the attatched instructions or speak to any of our staff to get you set up right away! Thank you so much for your cooperation. Should you have any questions please contact our Practice Administrator. The Physicians and Staff,  [facility] Introducing [facility]! Dear [place]: Thank you for requesting a [employer] account. Our records indicate that you already have an active [employer] account. You can access your account anytime at [email or web address] e [email or web address] Did you know that you can access your hospital and ER discharge instructions at any time in [employer]? You can also review all of your test results from your hospital stay or ER visit. Additional Information If you have questions, please visit the Frequently Asked Questions section of the [employer] website at [email or web address] Remember, [employer] is NOT to be used for urgent needs. For medical emergencies, dial 911. Now available from your iPhone and Android! Please provide this summary of care documentation to your next provider. Your primary care clinician is listed as Dang Pantoja. If you have any questions after today's visit, please call [phone].

## 2018-01-26 NOTE — PATIENT INSTRUCTIONS
[facility]   Statement to Patients  April 1, 2014      In an effort to ensure the large volume of patient prescription refills is processed in the most efficient and expeditious manner, we are asking our patients to assist us by calling your Pharmacy for all prescription refills, this will include also your  Mail Order Pharmacy. The pharmacy will contact our office electronically to continue the refill process. Please do not wait until the last minute to call your pharmacy. We need at least 48 hours (2days) to fill prescriptions. We also encourage you to call your pharmacy before going to  your prescription to make sure it is ready. With regard to controlled substance prescription refill requests (narcotic refills) that need to be picked up at our office, we ask your cooperation by providing us with at least 72 hours (3days) notice that you will need a refill. We will not refill narcotic prescription refill requests after 4:00pm on any weekday, Monday through Thursday, or after 2:00pm on Fridays, or on the weekends. We encourage everyone to explore another way of getting your prescription refill request processed using [employer], our patient web portal through our electronic medical record system. [employer] is an efficient and effective way to communicate your medication request directly to the office and  downloadable as an ainsley on your smart phone . [employer] also features a review functionality that allows you to view your medication list as well as leave messages for your physician. Are you ready to get connected? If so please review the attatched instructions or speak to any of our staff to get you set up right away! Thank you so much for your cooperation. Should you have any questions please contact our Practice Administrator.     The Physicians and Staff,  [facility]

## 2018-02-13 ENCOUNTER — OFFICE VISIT (OUTPATIENT)
Dept: CARDIOLOGY CLINIC | Age: 73
End: 2018-02-13

## 2018-02-13 ENCOUNTER — CLINICAL SUPPORT (OUTPATIENT)
Dept: CARDIOLOGY CLINIC | Age: 73
End: 2018-02-13

## 2018-02-13 VITALS
SYSTOLIC BLOOD PRESSURE: 150 MMHG | DIASTOLIC BLOOD PRESSURE: 90 MMHG | BODY MASS INDEX: 22.14 KG/M2 | OXYGEN SATURATION: 98 % | HEART RATE: 71 BPM | WEIGHT: 125 LBS

## 2018-02-13 DIAGNOSIS — R07.9 CHEST PAIN, UNSPECIFIED TYPE: Primary | ICD-10-CM

## 2018-02-13 DIAGNOSIS — R07.89 CHEST DISCOMFORT: Primary | ICD-10-CM

## 2018-02-13 DIAGNOSIS — I10 ESSENTIAL HYPERTENSION: ICD-10-CM

## 2018-02-13 DIAGNOSIS — E78.2 MIXED HYPERLIPIDEMIA: ICD-10-CM

## 2018-02-13 RX ORDER — CALCIUM CARBONATE 200(500)MG
1 TABLET,CHEWABLE ORAL DAILY
COMMUNITY
End: 2018-08-21 | Stop reason: ALTCHOICE

## 2018-02-13 NOTE — PROGRESS NOTES
Pt wants clearance for exercising    Pt did not take meds today    Visit Vitals    /90 (BP 1 Location: Left arm, BP Patient Position: Sitting)    Pulse 71    Wt 125 lb (56.7 kg)    SpO2 98%    BMI 22.14 kg/m2

## 2018-02-13 NOTE — PROGRESS NOTES
Kiah Rosales MD. [facility]              Patient: Clinton Nicole  : 1945      Today's Date: 2018            HISTORY OF PRESENT ILLNESS:     History of Present Illness:  Ms. Kyler Montgomery is self referred for a cardiac evaluation. She has had heart burn for years, but no having it during the day time . She also has had some neck/back and head pain randomly. She feels at night, her legs seem \"frozen\". She is to join [facility] but wants a cardiac evaluation. No exertional chest pain. No SOB. PAST MEDICAL HISTORY:     Past Medical History:   Diagnosis Date    Breast CA [facility])     breast cancer May 2010    R breast, stage IA; T1b,pN0, M0; Dr. Sahara Boo    HTN (hypertension)     Hypercholesterolemia     Osteoporosis     Pancreatitis          Past Surgical History:   Procedure Laterality Date    HX BREAST LUMPECTOMY             MEDICATIONS:     Current Outpatient Prescriptions   Medication Sig Dispense Refill    calcium carbonate (TUMS) 200 mg calcium (500 mg) chew Take 1 Tab by mouth daily.  denosumab (PROLIA) 60 mg/mL injection 60 mg by SubCUTAneous route every 6 months.  amLODIPine (NORVASC) 2.5 mg tablet TAKE 1 TABLET BY MOUTH DAILY FOR HIGH BLOOD PRESSURE 90 Tab 1    atorvastatin (LIPITOR) 10 mg tablet TAKE 1 TABLET DAILY 90 Tab 3    anastrozole (ARIMIDEX) 1 mg tablet Take 1 Tab by mouth daily.          Allergies   Allergen Reactions    Prilosec [Omeprazole] Other (comments)     Leg cramps    Zinc Swelling     Jewelry             SOCIAL HISTORY:     Social History   Substance Use Topics    Smoking status: Never Smoker    Smokeless tobacco: Never Used    Alcohol use 3.5 oz/week     7 Glasses of wine per week      Comment: rarely           FAMILY HISTORY:     Family History   Problem Relation Age of Onset    Osteoporosis Mother     Heart Failure Mother     Heart Disease Mother     Colon Cancer Father     Cancer Father      lung & colon    Cancer Sister      skin REVIEW OF SYMPTOMS:     Review of Symptoms:  Constitutional: Negative for fever, chills  HEENT: Negative for nosebleeds, tinnitus, and vision changes. Respiratory: Negative for cough, wheezing  Cardiovascular: Negative for orthopnea, claudication, syncope, and PND. Gastrointestinal: Negative for abdominal pain, diarrhea, melena. Genitourinary: Negative for dysuria  Musculoskeletal: Negative for myalgias. + back/ castellon pain. Skin: Negative for rash  Heme: No problems bleeding. Neurological: Negative for speech change and focal weakness. PHYSICAL EXAM:     Physical Exam:  Visit Vitals    /90 (BP 1 Location: Left arm, BP Patient Position: Sitting)    Pulse 71    Wt 125 lb (56.7 kg)    SpO2 98%    BMI 22.14 kg/m2     Patient appears generally well, mood and affect are appropriate and pleasant. HEENT:  Hearing intact, non-icteric, normocephalic, atraumatic. Neck Exam: Supple, No JVD or carotid bruits. Lung Exam: Clear to auscultation, even breath sounds. Cardiac Exam: Regular rate and rhythm with no murmur  Abdomen: Soft, non-tender, normal bowel sounds. No bruits or masses. Extremities: Moves all ext well. No lower extremity edema. Vascular: 2+ dorsalis pedis pulses bilaterally. Psych: Appropriate affect  Neuro - Grossly intact          LABS / OTHER STUDIES:       Lab Results   Component Value Date/Time    Sodium 147 (H) 06/07/2017 08:56 AM    Potassium 4.1 06/07/2017 08:56 AM    Chloride 106 06/07/2017 08:56 AM    CO2 24 06/07/2017 08:56 AM    Anion gap 6 04/14/2017 03:33 AM    Glucose 89 06/07/2017 08:56 AM    BUN 17 06/07/2017 08:56 AM    Creatinine 0.81 06/07/2017 08:56 AM    BUN/Creatinine ratio 21 06/07/2017 08:56 AM    GFR est AA 85 06/07/2017 08:56 AM    GFR est non-AA 73 06/07/2017 08:56 AM    Calcium 9.6 06/07/2017 08:56 AM    Bilirubin, total 0.5 06/07/2017 08:56 AM    AST (SGOT) 14 06/07/2017 08:56 AM    Alk.  phosphatase 52 06/07/2017 08:56 AM    Protein, total 7.1 06/07/2017 08:56 AM    Albumin 4.5 06/07/2017 08:56 AM    Globulin 3.4 04/13/2017 03:25 AM    A-G Ratio 1.7 06/07/2017 08:56 AM    ALT (SGPT) 12 06/07/2017 08:56 AM       Lab Results   Component Value Date/Time    WBC 6.2 06/07/2017 08:56 AM    HGB 12.0 06/07/2017 08:56 AM    HCT 35.3 06/07/2017 08:56 AM    PLATELET 646 55/01/8774 08:56 AM    MCV 90 06/07/2017 08:56 AM       Lab Results   Component Value Date/Time    Cholesterol, total 163 06/07/2017 08:56 AM    HDL Cholesterol 75 06/07/2017 08:56 AM    LDL, calculated 71 06/07/2017 08:56 AM    VLDL, calculated 17 06/07/2017 08:56 AM    Triglyceride 84 06/07/2017 08:56 AM    CHOL/HDL Ratio 2.0 04/10/2017 11:23 AM       Lab Results   Component Value Date/Time    TSH 1.130 06/07/2017 08:56 AM             CARDIAC DIAGNOSTICS:     Cardiac Evaluation Includes:    EKG 4/10/17 - NSR, non-specific T wave abn           ASSESSMENT AND PLAN:     Assessment and Plan:  1) Atypical chest pain   - Check an echo and a treadmill stress test to rule out high risk findings     2) HTN  - BP high today   - Have asked her to follow BP at home - goal BP < 130/80    3) CV Risk assessment   - ASCVD Risk is 17% over 10 years   - discussed ASA 81 mg daily as an option for primary prevention     4) Phone FU after testing. Patient expressed understanding of the plan - questions were answered. Kellen Fuentes MD, [place] [address], [address] [place] [address], [address] [place] [address] Drive  [address]  Ph: [phone]    [phone]        ADDENDUM   2/13/2018  Treadmill Stress Test 2/13/18 - walked 5:15 (7 METS). She complained of mild chest pressure with exercise, resolving with recovery. She had mild (0.5 -1.0 mm) ST depression in recovery  Echo 2/13/18 - LVEF 60-65%, RV normal;  normal study     Reviewed results with patient.   Given exertional chest pain and borderline EKG changes on the treadmill, will check an exercise cardiolite for further evaluation. ADDENDUM   2/23/2018  Exercise Cardiolite 2/21/18 - walked 7:18 (10.1 METS). Non-diagnostic EKG changes. Non-limiting chest pain. Myocardial perfusion imaging is normal.   LVEF 78%    Will have nurse call with normal results.

## 2018-02-13 NOTE — MR AVS SNAPSHOT
[address] [facility] [address] [facility] 
[phone] Patient: Kevan Rodriguez MRN:  [id number] Visit Information Date & Time Provider Department Dept. Phone Encounter #  
 2/13/2018  9:00 AM Joseluis Fajardo MD [facility] Paul Avalos [phone] [id number] Your Appointments 2/13/2018  1:00 PM  
STRESS ECHOCARDIOGRAMS with BIGG SALVADOR  
[facility] (KAT SCHEDULING) Appt Note: same day add on per dr Ed Ruiz dx cp reg stress test  
 [address] [facility] [address] [facility] [address]  
[phone]  
  
   
 [address] [facility] [address] [facility] [address]  
  
    
 2/21/2018  8:00 AM  
NUCLEAR MEDICINE with NUCLEARJUANA  
[facility] (KAT SCHEDULING) Appt Note: 2 day s-card per dr Ed Ruiz dx cp  
 [address] [facility] [address] 100[address] [facility]  
[address] [facility] [address]  
  
    
 2/21/2018  2:00 PM  
ESTABLISHED PATIENT with MD Isaías Coleman [address] [place] [address]) Appt Note: 6 month f/u/$25CP KMP 1/4/18  
 [address] [facility] [address] [phone]  
  
   
 [address] Upcoming Health Maintenance Date Due Influenza Age 5 to Adult 8/1/2017 COLONOSCOPY 4/30/2018 BREAST CANCER SCRN MAMMOGRAM 6/6/2018 MEDICARE YEARLY EXAM 6/8/2018 GLAUCOMA SCREENING Q2Y 3/27/2019 DTaP/Tdap/Td series (3 - Td) 5/9/2026 Allergies as of 2/13/2018  Review Complete On: 2/13/2018 By: Prateek Marino RN Severity Noted Reaction Type Reactions Prilosec [Omeprazole]  02/07/2011    Other (comments) Leg cramps Zinc  07/17/2015    Swelling Jewelry Current Immunizations  Reviewed on 6/7/2017 Name Date Influenza High Dose Vaccine PF 10/12/2015, 10/9/2014, 10/3/2013 Pneumococcal Conjugate (PCV-13) 6/7/2017 TDAP Vaccine 6/6/2006 Td, Adsorbed PF 5/9/2016 ZZZ-RETIRED (DO NOT USE) Pneumococcal Vaccine (Unspecified Type) 9/21/2011 Zoster 10/28/2011 Not reviewed this visit You Were Diagnosed With   
  
 Codes Comments Chest pain, unspecified type    -  Primary ICD-10-CM: R07.9 ICD-9-CM: 786.50 Mixed hyperlipidemia     ICD-10-CM: E78.2 ICD-9-CM: 272.2 Essential hypertension     ICD-10-CM: I10 
ICD-9-CM: 401.9 Vitals BP Pulse Weight(growth percentile) SpO2 BMI OB Status 150/90 (BP 1 Location: Left arm, BP Patient Position: Sitting) 71 125 lb (56.7 kg) 98% 22.14 kg/m2 Postmenopausal  
 Smoking Status Never Smoker Vitals History BMI and BSA Data Body Mass Index Body Surface Area  
 22.14 kg/m 2 1.59 m 2 Preferred Pharmacy Pharmacy Name Phone [facility] [address] AT [address] [place] [address] Drive [phone] Your Updated Medication List  
  
   
This list is accurate as of: 2/13/18 11:28 AM.  Always use your most recent med list. amLODIPine 2.5 mg tablet Commonly known as:  Deborha Cords TAKE 1 TABLET BY MOUTH DAILY FOR HIGH BLOOD PRESSURE  
  
 anastrozole 1 mg tablet Commonly known as:  ARIMIDEX Take 1 Tab by mouth daily. atorvastatin 10 mg tablet Commonly known as:  LIPITOR  
TAKE 1 TABLET DAILY  
  
 calcium carbonate 200 mg calcium (500 mg) Chew Commonly known as:  TUMS Take 1 Tab by mouth daily. PROLIA 60 mg/mL injection Generic drug:  denosumab 60 mg by SubCUTAneous route every 6 months. We Performed the Following AMB POC EKG ROUTINE W/ 12 LEADS, INTER & REP [65744 CPT(R)] Introducing \[facility] [place] [facility]\"" & HEALTH SERVICES! Dear Froylan Stewart: Thank you for requesting a Hubbah[employer] account.   Our records indicate that you already have an active SwitchNote account. You can access your account anytime at [email or web address] Did you know that you can access your hospital and ER discharge instructions at any time in SwitchNote? You can also review all of your test results from your hospital stay or ER visit. Additional Information If you have questions, please visit the Frequently Asked Questions section of the SwitchNote website at [email or web address] Remember, SwitchNote is NOT to be used for urgent needs. For medical emergencies, dial 911. Now available from your iPhone and Android! Please provide this summary of care documentation to your next provider. Your primary care clinician is listed as Gonzalo Alvarado. If you have any questions after today's visit, please call [phone].

## 2018-02-13 NOTE — PROGRESS NOTES
Explained procedure to patient, monitoring EKG/HR/BP,  walking on treadmill,  and risks/discomforts. All concerns and questions addressed. Consent obtained. Pt achieved 7.00  METS,  (110% of THR) at peak exercise. Pt had chest tightness at peak exercise that immediately subside in recovery. See scanned report.  ordered and Dr. Kianna Rojo read study. ID verified per protocol. Pt  reported no symptoms at completion of protocol.

## 2018-02-21 ENCOUNTER — CLINICAL SUPPORT (OUTPATIENT)
Dept: CARDIOLOGY CLINIC | Age: 73
End: 2018-02-21

## 2018-02-21 DIAGNOSIS — R73.01 IFG (IMPAIRED FASTING GLUCOSE): ICD-10-CM

## 2018-02-21 DIAGNOSIS — R07.89 CHEST DISCOMFORT: Primary | ICD-10-CM

## 2018-02-21 DIAGNOSIS — E78.2 MIXED HYPERLIPIDEMIA: Primary | ICD-10-CM

## 2018-02-21 DIAGNOSIS — E55.9 VITAMIN D DEFICIENCY: ICD-10-CM

## 2018-02-21 DIAGNOSIS — E78.2 MIXED HYPERLIPIDEMIA: ICD-10-CM

## 2018-02-21 NOTE — PROGRESS NOTES
ID verified per protocol. Explained procedure to patient, Obtaining IV access, radiation exposure, risks and discomforts (for exercise stress test), waiting between injections and obtaining images. All concerns and questions addressed, prior to obtaining consent.

## 2018-02-23 ENCOUNTER — TELEPHONE (OUTPATIENT)
Dept: CARDIOLOGY CLINIC | Age: 73
End: 2018-02-23

## 2018-02-23 NOTE — TELEPHONE ENCOUNTER
----- Message from Katharine Perkins MD sent at 2/23/2018 12:25 AM EST -----  Regarding: please call with patient  Please call. Exercise Cardiolite 2/21/18 - walked 7:18 (10.1 METS). Non-diagnostic EKG changes. Non-limiting chest pain. Myocardial perfusion imaging is normal.   LVEF 78%    Will have nurse call with normal results.      Thanks,  SK

## 2018-04-02 DIAGNOSIS — I10 ESSENTIAL HYPERTENSION: ICD-10-CM

## 2018-04-03 RX ORDER — AMLODIPINE BESYLATE 2.5 MG/1
TABLET ORAL
Qty: 90 TAB | Refills: 0 | Status: SHIPPED | OUTPATIENT
Start: 2018-04-03 | End: 2018-07-06 | Stop reason: SDUPTHER

## 2018-04-27 ENCOUNTER — ANESTHESIA EVENT (OUTPATIENT)
Dept: ENDOSCOPY | Age: 73
End: 2018-04-27
Payer: MEDICARE

## 2018-04-27 ENCOUNTER — HOSPITAL ENCOUNTER (OUTPATIENT)
Age: 73
Setting detail: OUTPATIENT SURGERY
Discharge: HOME | End: 2018-04-27
Attending: INTERNAL MEDICINE | Admitting: INTERNAL MEDICINE
Payer: MEDICARE

## 2018-04-27 ENCOUNTER — ANESTHESIA (OUTPATIENT)
Dept: ENDOSCOPY | Age: 73
End: 2018-04-27
Payer: MEDICARE

## 2018-04-27 VITALS
HEART RATE: 68 BPM | OXYGEN SATURATION: 100 % | SYSTOLIC BLOOD PRESSURE: 153 MMHG | TEMPERATURE: 97.5 F | RESPIRATION RATE: 17 BRPM | DIASTOLIC BLOOD PRESSURE: 74 MMHG

## 2018-04-27 PROCEDURE — 88305 TISSUE EXAM BY PATHOLOGIST: CPT | Performed by: INTERNAL MEDICINE

## 2018-04-27 PROCEDURE — 74011250636 HC RX REV CODE- 250/636

## 2018-04-27 PROCEDURE — 76040000019: Performed by: INTERNAL MEDICINE

## 2018-04-27 PROCEDURE — 76060000031 HC ANESTHESIA FIRST 0.5 HR: Performed by: INTERNAL MEDICINE

## 2018-04-27 PROCEDURE — 77030027957 HC TBNG IRR ENDOGTR BUSS -B: Performed by: INTERNAL MEDICINE

## 2018-04-27 PROCEDURE — 77030013992 HC SNR POLYP ENDOSC BSC -B: Performed by: INTERNAL MEDICINE

## 2018-04-27 RX ORDER — SODIUM CHLORIDE 0.9 % (FLUSH) 0.9 %
5-10 SYRINGE (ML) INJECTION AS NEEDED
Status: DISCONTINUED | OUTPATIENT
Start: 2018-04-27 | End: 2018-04-27 | Stop reason: HOSPADM

## 2018-04-27 RX ORDER — FENTANYL CITRATE 50 UG/ML
100 INJECTION, SOLUTION INTRAMUSCULAR; INTRAVENOUS
Status: DISCONTINUED | OUTPATIENT
Start: 2018-04-27 | End: 2018-04-27 | Stop reason: HOSPADM

## 2018-04-27 RX ORDER — MIDAZOLAM HYDROCHLORIDE 1 MG/ML
.25-1 INJECTION, SOLUTION INTRAMUSCULAR; INTRAVENOUS
Status: DISCONTINUED | OUTPATIENT
Start: 2018-04-27 | End: 2018-04-27 | Stop reason: HOSPADM

## 2018-04-27 RX ORDER — RANITIDINE 150 MG/1
150 TABLET, FILM COATED ORAL DAILY
COMMUNITY
End: 2019-04-12

## 2018-04-27 RX ORDER — EPINEPHRINE 0.1 MG/ML
1 INJECTION INTRACARDIAC; INTRAVENOUS
Status: DISCONTINUED | OUTPATIENT
Start: 2018-04-27 | End: 2018-04-27 | Stop reason: HOSPADM

## 2018-04-27 RX ORDER — SODIUM CHLORIDE 9 MG/ML
INJECTION, SOLUTION INTRAVENOUS
Status: DISCONTINUED | OUTPATIENT
Start: 2018-04-27 | End: 2018-04-27 | Stop reason: HOSPADM

## 2018-04-27 RX ORDER — DEXTROMETHORPHAN/PSEUDOEPHED 2.5-7.5/.8
1.2 DROPS ORAL
Status: DISCONTINUED | OUTPATIENT
Start: 2018-04-27 | End: 2018-04-27 | Stop reason: HOSPADM

## 2018-04-27 RX ORDER — SODIUM CHLORIDE 0.9 % (FLUSH) 0.9 %
5-10 SYRINGE (ML) INJECTION EVERY 8 HOURS
Status: DISCONTINUED | OUTPATIENT
Start: 2018-04-27 | End: 2018-04-27 | Stop reason: HOSPADM

## 2018-04-27 RX ORDER — PROPOFOL 10 MG/ML
INJECTION, EMULSION INTRAVENOUS AS NEEDED
Status: DISCONTINUED | OUTPATIENT
Start: 2018-04-27 | End: 2018-04-27 | Stop reason: HOSPADM

## 2018-04-27 RX ORDER — SODIUM CHLORIDE 9 MG/ML
50 INJECTION, SOLUTION INTRAVENOUS CONTINUOUS
Status: DISCONTINUED | OUTPATIENT
Start: 2018-04-27 | End: 2018-04-27 | Stop reason: HOSPADM

## 2018-04-27 RX ORDER — FLUMAZENIL 0.1 MG/ML
0.2 INJECTION INTRAVENOUS
Status: DISCONTINUED | OUTPATIENT
Start: 2018-04-27 | End: 2018-04-27 | Stop reason: HOSPADM

## 2018-04-27 RX ORDER — NALOXONE HYDROCHLORIDE 0.4 MG/ML
0.4 INJECTION, SOLUTION INTRAMUSCULAR; INTRAVENOUS; SUBCUTANEOUS
Status: DISCONTINUED | OUTPATIENT
Start: 2018-04-27 | End: 2018-04-27 | Stop reason: HOSPADM

## 2018-04-27 RX ORDER — ATROPINE SULFATE 0.1 MG/ML
0.5 INJECTION INTRAVENOUS
Status: DISCONTINUED | OUTPATIENT
Start: 2018-04-27 | End: 2018-04-27 | Stop reason: HOSPADM

## 2018-04-27 RX ADMIN — PROPOFOL 25 MG: 10 INJECTION, EMULSION INTRAVENOUS at 09:49

## 2018-04-27 RX ADMIN — PROPOFOL 70 MG: 10 INJECTION, EMULSION INTRAVENOUS at 09:39

## 2018-04-27 RX ADMIN — PROPOFOL 25 MG: 10 INJECTION, EMULSION INTRAVENOUS at 09:46

## 2018-04-27 RX ADMIN — PROPOFOL 80 MG: 10 INJECTION, EMULSION INTRAVENOUS at 09:42

## 2018-04-27 RX ADMIN — SODIUM CHLORIDE: 9 INJECTION, SOLUTION INTRAVENOUS at 09:32

## 2018-04-27 NOTE — PROCEDURES
[address] [facility] [address]  [address], [address]  [phone]                              Colonoscopy Procedure Note      Indications:    Family history of coloretal cancer (screening only), Personal history of colon polyps (screening only)     :  Romy Patrick MD    Referring Provider: Nitin Harkins MD    Sedation:  MAC anesthesia    Procedure Details:  After informed consent was obtained with all risks and benefits of procedure explained and preoperative exam completed, the patient was taken to the endoscopy suite and placed in the left lateral decubitus position. Upon sequential sedation as per above, a digital rectal exam was performed  And was normal.  The Olympus videocolonoscope  was inserted in the rectum and carefully advanced to the cecum, which was identified by the ileocecal valve and appendiceal orifice. The quality of preparation was good. The colonoscope was slowly withdrawn with careful evaluation between folds. Retroflexion in the rectum was performed and was normal..     Findings:   Rectum: A sessile polyp 6 mm in size was seen in proximal rectum. Small internal and external hemorrhoids were seen. No bleeding or stigmata were seen. Sigmoid: no mucosal lesion appreciated      - Diverticulosis  Descending Colon: no mucosal lesion appreciated  Transverse Colon: no mucosal lesion appreciated  Ascending Colon: no mucosal lesion appreciated      - Diverticulosis  Cecum: 1  Sessile polyp(s), the largest 6 mm in size; Terminal Ileum: not intubated    Interventions:  2 complete polypectomy were performed using cold snare  and the polyps were  retrieved    Specimen Removed:    ID Type Source Tests Collected by Time Destination   1 : cecum polyp Preservative   Romy Patrick MD 4/27/2018 1073 Pathology   2 : rectal polyp Preservative   Romy Patrick MD 4/27/2018 8428 Pathology       Complications: None. EBL:  None.     Recommendations: -Repeat colonoscopy in 5 years.     -Resume normal medication(s). -NO aspirin for 7 days   -High fiber diet    Discharge Disposition:  Home in the company of a  when able to ambulate.     Marilee Hernandez MD  4/27/2018  9:54 AM

## 2018-04-27 NOTE — ANESTHESIA POSTPROCEDURE EVALUATION
Post-Anesthesia Evaluation and Assessment    Patient: Sherial Severance MRN: [id number]  SSN: [id number]    YOB: 1945  Age: 68 y.o. Sex: female       Cardiovascular Function/Vital Signs  Visit Vitals    /74    Pulse 68    Temp 36.4 °C (97.5 °F)    Resp 17    SpO2 100%       Patient is status post MAC anesthesia for Procedure(s):  COLONOSCOPY  ENDOSCOPIC POLYPECTOMY. Nausea/Vomiting: None    Postoperative hydration reviewed and adequate. Pain:  Pain Scale 1: (P) Numeric (0 - 10) (04/27/18 1014)  Pain Intensity 1: (P) 0 (04/27/18 1014)   Managed    Neurological Status: At baseline    Mental Status and Level of Consciousness: Arousable    Pulmonary Status:   O2 Device: (P) Room air (04/27/18 1014)   Adequate oxygenation and airway patent    Complications related to anesthesia: None    Post-anesthesia assessment completed.  No concerns    Signed By: Pari Edmonds DO     April 27, 2018

## 2018-04-27 NOTE — ROUTINE PROCESS
Carla Spatz  1945  [id number]    Situation:  Verbal report received from: Jennifer Saldana RN  Procedure: Procedure(s):  COLONOSCOPY  ENDOSCOPIC POLYPECTOMY    Background:    Preoperative diagnosis: FAMILY HISTORY OF MALIGNANT NEOPLASM OF GASTROINTESTINAL TRACT  Postoperative diagnosis: 1. internal and external hemorrhoids  2. diverticulosis  3. two polyps    :  Dr. Glen Jackson  Assistant(s): Endoscopy Technician-1: Irena Roberts  Endoscopy RN-1: Kari Encinas RN    Specimens:   ID Type Source Tests Collected by Time Destination   1 : cecum polyp Preservative   Melany Anderson MD 4/27/2018 1317 Pathology   2 : rectal polyp Preservative   Melany Anderson MD 4/27/2018 0221 Pathology     H. Pylori  no    Assessment:  Intra-procedure medications         Anesthesia gave intra-procedure sedation and medications, see anesthesia flow sheet yes    Intravenous fluids: NS@ KVO     Vital signs stable     Abdominal assessment: round and soft     Recommendation:  Discharge patient per MD order  Family or Friend Pt   Permission to share finding with family or friend yes

## 2018-04-27 NOTE — DISCHARGE INSTRUCTIONS
[address] [facility] [address] [facility] [address]  [address] [facility] [address], [address]  [address]  [id number]  1945    COLON DISCHARGE INSTRUCTIONS    DISCOMFORT:  Redness at IV site- apply warm compress to area; if redness or soreness persist- contact your physician  There may be a slight amount of blood passed from the rectum  Gaseous discomfort- walking, belching will help relieve any discomfort  You may not operate a vehicle for 12 hours  You may not  engage in an occupation involving machinery or appliances for rest of today  You may not  drink alcoholic beverages for at least 12 hours  Avoid making any critical decisions for at least 24 hour    DIET:   High fiber diet. - however -  remember your colon is empty and a heavy meal will produce gas. Avoid these foods:  vegetables, fried / greasy foods, carbonated drinks for today     ACTIVITY:  It is recommended that you spend the remainder of the day resting -  avoid any strenuous activity. CALL M.D. ANY SIGN OF:   Increasing pain, nausea, vomiting  Abdominal distension (swelling)  New increased bleeding (oral or rectal)  Fever (chills)  Pain in chest area  Bloody discharge from nose or mouth  Shortness of breath    You may not  take any Advil, Aspirin, Ibuprofen, Motrin, Aleve, or Goodys for 7 days, ONLY  Tylenol as needed for pain. Post procedure diagnosis:  1. internal and external hemorrhoids  2. diverticulosis  3. two polyps    Follow-up Instructions:    Call Dr. Natalya Littlejohn for any questions or problems. If we took a biopsy please call the office within 2 weeks to discuss your                             pathology results.  Telephone # [phone]

## 2018-04-27 NOTE — IP AVS SNAPSHOT
[address] 
[phone] Patient: Camila Carmona MRN: [id number] [facility]:5/50/4958 About your hospitalization You were admitted on:  April 27, 2018 You last received care in the:  [facility] ENDOSCOPY You were discharged on:  April 27, 2018 Why you were hospitalized Your primary diagnosis was:  Not on File Follow-up Information None Discharge Orders None A check jared indicates which time of day the medication should be taken. My Medications CONTINUE taking these medications Instructions Each Dose to Equal  
 Morning Noon Evening Bedtime  
 amLODIPine 2.5 mg tablet Commonly known as:  Remonia Grates Your last dose was: Your next dose is: TAKE 1 TABLET BY MOUTH DAILY FOR HIGH BLOOD PRESSURE  
     
   
   
   
  
 anastrozole 1 mg tablet Commonly known as:  ARIMIDEX Your last dose was: Your next dose is: Take 1 Tab by mouth daily. 1 mg  
    
   
   
   
  
 atorvastatin 10 mg tablet Commonly known as:  LIPITOR Your last dose was: Your next dose is: TAKE 1 TABLET DAILY  
     
   
   
   
  
 calcium carbonate 200 mg calcium (500 mg) Chew Commonly known as:  TUMS Your last dose was: Your next dose is: Take 1 Tab by mouth daily. 1 Tab PROLIA 60 mg/mL injection Generic drug:  denosumab Your last dose was: Your next dose is:    
   
   
 60 mg by SubCUTAneous route every 6 months. 60 mg  
    
   
   
   
  
 raNITIdine 150 mg tablet Commonly known as:  ZANTAC Your last dose was: Your next dose is: Take 150 mg by mouth two (2) times a day. 150 mg Discharge Instructions [address] 
[address] 
[phone] Salome Margarita [id number] 1945 COLON DISCHARGE INSTRUCTIONS DISCOMFORT: 
Redness at IV site- apply warm compress to area; if redness or soreness persist- contact your physician There may be a slight amount of blood passed from the rectum Gaseous discomfort- walking, belching will help relieve any discomfort You may not operate a vehicle for 12 hours You may not  engage in an occupation involving machinery or appliances for rest of today You may not  drink alcoholic beverages for at least 12 hours Avoid making any critical decisions for at least 24 hour DIET: 
 High fiber diet.  however -  remember your colon is empty and a heavy meal will produce gas. Avoid these foods:  vegetables, fried / greasy foods, carbonated drinks for today ACTIVITY: It is recommended that you spend the remainder of the day resting -  avoid any strenuous activity. CALL M.D. ANY SIGN OF: Increasing pain, nausea, vomiting Abdominal distension (swelling) New increased bleeding (oral or rectal) Fever (chills) Pain in chest area Bloody discharge from nose or mouth Shortness of breath You may not  take any Advil, Aspirin, Ibuprofen, Motrin, Aleve, or Goodys for 7 days, ONLY  Tylenol as needed for pain. Post procedure diagnosis:  1. internal and external hemorrhoids 2. diverticulosis 3. two polyps Follow-up Instructions: 
 
Call Dr. Clementine Weiss for any questions or problems. If we took a biopsy please call the office within 2 weeks to discuss your                             pathology results. Telephone # [phone] Introducing hospitals & HEALTH SERVICES! Dear Costa Porter: Thank you for requesting a [employer] account. Our records indicate that you already have an active [employer] account. You can access your account anytime at [email or web address] Did you know that you can access your hospital and ER discharge instructions at any time in Machiniohart? You can also review all of your test results from your hospital stay or ER visit. Additional Information If you have questions, please visit the Frequently Asked Questions section of the Roadtripperst website at [email or web address] Remember, Roadtripperst is NOT to be used for urgent needs. For medical emergencies, dial 911. Now available from your iPhone and Android! Introducing Zay Angulo As a [facility] patient, I wanted to make you aware of our electronic visit tool called Zay Angulo. [employer] 24/7 allows you to connect within minutes with a medical provider 24 hours a day, seven days a week via a mobile device or tablet or logging into a secure website from your computer. You can access Zay Angulo from anywhere in the [place]. A virtual visit might be right for you when you have a simple condition and feel like you just dont want to get out of bed, or cant get away from work for an appointment, when your regular [facility] provider is not available (evenings, weekends or holidays), or when youre out of town and need minor care. Electronic visits cost only $49 and if the [employer] 24/R[employer] provider determines a prescription is needed to treat your condition, one can be electronically transmitted to a nearby pharmacy*. Please take a moment to enroll today if you have not already done so. The enrollment process is free and takes just a few minutes. To enroll, please download the [employer] 24/[employer] ainsley to your tablet or phone, or visit [email or web address] to enroll on your computer. And, as an [address] [place] [address] patient with a [employer] account, the results of your visits will be scanned into your electronic medical record and your primary care provider will be able to view the scanned results.    
We urge you to continue to see your regular [facility] provider for your ongoing medical care. And while your primary care provider may not be the one available when you seek a Zay Angulo virtual visit, the peace of mind you get from getting a real diagnosis real time can be priceless. For more information on Zay Davycindy, view our Frequently Asked Questions (FAQs) at [email or web address]. Sincerely, 
 
Torrey Ashley MD 
Chief Medical Officer Jazlyn Saha *:  certain medications cannot be prescribed via Zay Angulo Providers Seen During Your Hospitalization Provider Specialty Primary office phone Shavonne French MD Gastroenterology [phone] Your Primary Care Physician (PCP) Primary Care Physician Office Phone Office Fax Parker Dinero [phone] You are allergic to the following Allergen Reactions Prilosec (Omeprazole) Other (comments) Leg cramps Zinc Swelling Jewelry Recent Documentation OB Status Smoking Status Postmenopausal Never Smoker Emergency Contacts Name Discharge Info Relation Home Work Mobile [address] CAREGIVER [3] Daughter [21] [phone] Emeka Tang  Child [2] [phone] Patient Belongings The following personal items are in your possession at time of discharge: 
  Dental Appliances: None  Visual Aid: Glasses, With patient Please provide this summary of care documentation to your next provider. Signatures-by signing, you are acknowledging that this After Visit Summary has been reviewed with you and you have received a copy. Patient Signature:  ____________________________________________________________ Date:  ____________________________________________________________  
  
Sammie Romo Provider Signature:  ____________________________________________________________ Date:  ____________________________________________________________

## 2018-04-27 NOTE — ANESTHESIA PREPROCEDURE EVALUATION
Anesthetic History   No history of anesthetic complications            Review of Systems / Medical History  Patient summary reviewed, nursing notes reviewed and pertinent labs reviewed    Pulmonary  Within defined limits                 Neuro/Psych   Within defined limits           Cardiovascular    Hypertension                   GI/Hepatic/Renal  Within defined limits              Endo/Other        Cancer     Other Findings              Physical Exam    Airway  Mallampati: II  TM Distance: > 6 cm  Neck ROM: normal range of motion   Mouth opening: Normal     Cardiovascular  Regular rate and rhythm,  S1 and S2 normal,  no murmur, click, rub, or gallop             Dental  No notable dental hx       Pulmonary  Breath sounds clear to auscultation               Abdominal  GI exam deferred       Other Findings            Anesthetic Plan    ASA: 2  Anesthesia type: MAC          Induction: Intravenous  Anesthetic plan and risks discussed with: Patient

## 2018-04-27 NOTE — PROGRESS NOTES

## 2018-04-27 NOTE — H&P
[address]   217 [facility] [address], [address]   [phone]                                History and Physical     NAME: Jv Bella   :  1945   MRN:  [id number]     HPI:  The patient was seen and examined. Date of last colonoscopy: 5 years, Polyps  Yes     Past Surgical History:   Procedure Laterality Date    HX BREAST LUMPECTOMY      right     Past Medical History:   Diagnosis Date    breast cancer May 2010    R breast, stage IA; T1b,pN0, M0; Dr. Alvarez Dears HTN (hypertension)     Hypercholesterolemia     Osteoporosis     Pancreatitis      Social History   Substance Use Topics    Smoking status: Never Smoker    Smokeless tobacco: Never Used    Alcohol use Yes      Comment: rarely     Allergies   Allergen Reactions    Prilosec [Omeprazole] Other (comments)     Leg cramps    Zinc Swelling     Jewelry     Family History   Problem Relation Age of Onset    Osteoporosis Mother     Heart Failure Mother     Heart Disease Mother     Colon Cancer Father     Cancer Father      lung & colon    Cancer Sister      skin - BCCA     No current facility-administered medications for this encounter. PHYSICAL EXAM:  General: WD, WN. Alert, cooperative, no acute distress    HEENT: NC, Atraumatic. PERRLA, EOMI. Anicteric sclerae. Lungs:  CTA Bilaterally. No Wheezing/Rhonchi/Rales. Heart:  Regular  rhythm,  No murmur, No Rubs, No Gallops  Abdomen: Soft, Non distended, Non tender.  +Bowel sounds, no HSM  Extremities: No c/c/e  Neurologic:  CN 2-12 gi, Alert and oriented X 3. No acute neurological distress   Psych:   Good insight. Not anxious nor agitated. The heart, lungs and mental status were satisfactory for the administration of MAC sedation and for the procedure.       Mallampati score: 2       Assessment:   · Personal history colon polyps    Plan:   · Endoscopic procedure  · MAC sedation   ·   ·

## 2018-07-06 DIAGNOSIS — I10 ESSENTIAL HYPERTENSION: ICD-10-CM

## 2018-07-06 RX ORDER — AMLODIPINE BESYLATE 2.5 MG/1
TABLET ORAL
Qty: 90 TAB | Refills: 0 | Status: SHIPPED | OUTPATIENT
Start: 2018-07-06 | End: 2018-10-07 | Stop reason: SDUPTHER

## 2018-07-27 RX ORDER — ATORVASTATIN CALCIUM 10 MG/1
TABLET, FILM COATED ORAL
Qty: 90 TAB | Refills: 0 | Status: SHIPPED | OUTPATIENT
Start: 2018-07-27 | End: 2018-11-11 | Stop reason: SDUPTHER

## 2018-08-21 ENCOUNTER — OFFICE VISIT (OUTPATIENT)
Dept: INTERNAL MEDICINE CLINIC | Facility: CLINIC | Age: 73
End: 2018-08-21

## 2018-08-21 VITALS
SYSTOLIC BLOOD PRESSURE: 132 MMHG | HEIGHT: 63 IN | WEIGHT: 125 LBS | TEMPERATURE: 98.3 F | BODY MASS INDEX: 22.15 KG/M2 | OXYGEN SATURATION: 97 % | DIASTOLIC BLOOD PRESSURE: 65 MMHG | RESPIRATION RATE: 16 BRPM | HEART RATE: 72 BPM

## 2018-08-21 DIAGNOSIS — K57.30 DIVERTICULOSIS OF COLON: ICD-10-CM

## 2018-08-21 DIAGNOSIS — M21.961 FOOT DEFORMITY, ACQUIRED, RIGHT: ICD-10-CM

## 2018-08-21 DIAGNOSIS — K21.9 GASTROESOPHAGEAL REFLUX DISEASE WITHOUT ESOPHAGITIS: ICD-10-CM

## 2018-08-21 DIAGNOSIS — Z78.9 ADVANCE DIRECTIVE ON FILE: ICD-10-CM

## 2018-08-21 DIAGNOSIS — I10 ESSENTIAL HYPERTENSION: ICD-10-CM

## 2018-08-21 DIAGNOSIS — Z13.31 SCREENING FOR DEPRESSION: ICD-10-CM

## 2018-08-21 DIAGNOSIS — Z17.0 MALIGNANT NEOPLASM OF RIGHT BREAST, STAGE 1, ESTROGEN RECEPTOR POSITIVE (HCC): ICD-10-CM

## 2018-08-21 DIAGNOSIS — Z00.00 MEDICARE ANNUAL WELLNESS VISIT, INITIAL: Primary | ICD-10-CM

## 2018-08-21 DIAGNOSIS — M81.0 OSTEOPOROSIS, UNSPECIFIED OSTEOPOROSIS TYPE, UNSPECIFIED PATHOLOGICAL FRACTURE PRESENCE: ICD-10-CM

## 2018-08-21 DIAGNOSIS — D12.6 TUBULAR ADENOMA OF COLON: ICD-10-CM

## 2018-08-21 DIAGNOSIS — H61.21 IMPACTED CERUMEN OF RIGHT EAR: ICD-10-CM

## 2018-08-21 DIAGNOSIS — C50.911 MALIGNANT NEOPLASM OF RIGHT BREAST, STAGE 1, ESTROGEN RECEPTOR POSITIVE (HCC): ICD-10-CM

## 2018-08-21 DIAGNOSIS — E78.2 MIXED HYPERLIPIDEMIA: ICD-10-CM

## 2018-08-21 RX ORDER — ASPIRIN 81 MG/1
TABLET ORAL DAILY
COMMUNITY
End: 2019-04-12

## 2018-08-21 NOTE — PROGRESS NOTES
This is an Initial Medicare Annual Wellness Exam (AWV) (Performed 12 months after IPPE or effective date of Medicare Part B enrollment, Once in a lifetime)    I have reviewed the patient's medical history in detail and updated the computerized patient record. History   Gabriela Wilkinson is a 68 y.o. female. Presents for Welcome to [facility] Visit and annual physical exam.  She has hyperlipidemia, breast cancer diagnosed in 2010 (right breast, ER/ID positive, is on Arimidex), osteoporosis, history of acute pancreatitis requiring hospitalization in 4/17, ocular migraine headaches, diverticulosis, and eczema. Today she complains of right ear fullness for a week and new deformity at her right foot for the past 2 weeks. Broke her right 5th toe in 1/18; saw an orthopedist and wore a therapeutic boot on it for a while. Started noticing change in the shape of her right foot with new pain 2 weeks ago. Over the past year, has seen the following doctors:    1) Dr. Hilton Pimentel. Underwent hemorrhoidal banding procedure in the clinic in 3/18. 2) Dr. Abiola Wellington. Was taken off Prolia recently because it was not improving her osteoporosis. 3) Dr. Edwin Santana (Cardiology). Was having chest pain and heart palpitations. Had a negative cardiac stress test.   4) Dr. Kyler Garcia. Had colonoscopy on 4/28/18:  tubular adenoma at cecum, hyperplastic polyp at rectum, hemorrhoids, diverticulosis, repeat in 3 yrs. Has appointment with Dr. Kyler Garcia for pancreatitis follow up in Sept 2018.     Cardiovascular Review  The patient has hyperlipidemia. She reports taking medications as instructed, no medication side effects noted. Diet and Lifestyle: generally follows a low fat low cholesterol diet, no formal exercise but active during the day. Lab review: labs reviewed and discussed with patient. Breast cancer  Diagnosed 5/26/10; stage Ia. She is s/p R lumpectomy, radiation therapy, and tamoxifen/Arimidex.   Follows with Dr. Abiola Wellington every 6 months.      Soc Hx  . Has 2 living adult children (both live in [place]) and 4 grandchildren. She retired in 8/17; worked as the  for [employer] the [employer]. Never smoker. Drinks alcohol infrequently since having pancreatitis in 4/17. Exercises by walking at [address]  Flu vaccine: 10/1/16  Pneumonia vaccine: PPSV 9/21/11  Tetanus vaccine: Tdap 6/6/06  Zoster vaccine: 10/28/11  Colonoscopy: 4/27/18, Dr. Rosa Mendoza, tubular adenoma, repeat in 3 yrs; her father had colon cancer at age 66. Mammogram: 4/18, [address]  Bone density: 2018   Eye exam: 3/18, Dr. Logan Arriaga, [facility]  Lipids: 8/14/18 (tot chol 165, LDL 71)  A1c: 8/14/18 (normal at 5.5%)  Advanced Directives: on file  End of Life: on file      ROS  A complete review of systems was performed and is negative except for those mentioned in the HPI. Past Medical History:   Diagnosis Date    breast cancer May 2010    R breast, stage IA; T1b,pN0, M0; Dr. Padilla Figures HTN (hypertension)     Hypercholesterolemia     Osteoporosis     Pancreatitis       Past Surgical History:   Procedure Laterality Date    COLONOSCOPY N/A 4/27/2018    COLONOSCOPY performed by Moy Pastor MD at [facility] ENDOSCOPY; tubular adenoma at cecum, hyperplastic polyp at rectum, hemorrhoids, diverticulosis, repeat in 3 yrs    HX BREAST LUMPECTOMY      right     Current Outpatient Prescriptions   Medication Sig Dispense Refill    aspirin delayed-release 81 mg tablet Take  by mouth daily.  atorvastatin (LIPITOR) 10 mg tablet TAKE 1 TABLET BY MOUTH EVERY DAY 90 Tab 0    amLODIPine (NORVASC) 2.5 mg tablet TAKE 1 TABLET BY MOUTH DAILY FOR HIGH BLOOD PRESSURE 90 Tab 0    raNITIdine (ZANTAC) 150 mg tablet Take 150 mg by mouth daily.  anastrozole (ARIMIDEX) 1 mg tablet Take 1 Tab by mouth daily.        Allergies   Allergen Reactions    Prilosec [Omeprazole] Other (comments) Leg cramps    Zinc Swelling     Jewelry     Family History   Problem Relation Age of Onset    Osteoporosis Mother     Heart Failure Mother     Heart Disease Mother     Colon Cancer Father     Cancer Father      lung & colon    Cancer Sister      skin - BCCA     Social History   Substance Use Topics    Smoking status: Never Smoker    Smokeless tobacco: Never Used    Alcohol use Yes      Comment: rarely     Patient Active Problem List   Diagnosis Code    Malignant neoplasm of right breast, stage 1, estrogen receptor positive ([facility] [address] C50.911, Z17.0    Osteoporosis M81.0    Ocular migraine G43. 109    Diverticulosis of colon K57.30    Eczema L30.9    Advance directive on file Z78.9    Mixed hyperlipidemia E78.2    Acute pancreatitis K85.90         Depression Risk Factor Screening:     PHQ over the last two weeks 8/21/2018   Little interest or pleasure in doing things Not at all   Feeling down, depressed, irritable, or hopeless Not at all   Total Score PHQ 2 0     Alcohol Risk Factor Screening: You do not drink alcohol or very rarely. Functional Ability and Level of Safety:     Hearing Loss   Hearing is good. Activities of Daily Living   Self-care. Requires assistance with: no ADLs    Fall Risk     Fall Risk Assessment, last 12 mths 8/21/2018   Able to walk? Yes   Fall in past 12 months? No     Abuse Screen   Patient is not abused    Review of Systems   Pertinent items are noted in HPI. Physical Examination     Evaluation of Cognitive Function:  Mood/affect:  happy  Appearance: age appropriate  Family member/caregiver input: none    Visit Vitals    /65 (BP 1 Location: Left arm, BP Patient Position: Sitting)    Pulse 72    Temp 98.3 °F (36.8 °C) (Oral)    Resp 16    Ht 5' 3\" (1.6 m)    Wt 125 lb (56.7 kg)    SpO2 97%    BMI 22.14 kg/m2       General: Well-developed and well-nourished, no distress. HEENT:  Head normocephalic/atraumatic, no scleral icterus.  Left TM and ear canal normal.  Right ear canal with cerumen impaction. Neck: Supple. No carotid bruits, JVD, lymphadenopathy, or thyromegaly. Lungs:  Clear to ausculation bilaterally. Good air movement. Heart:  Regular rate and rhythm, normal S1 and S2, no murmur, gallop, or rub  Abdomen: Soft, non-distended, normal bowel sounds, no tenderness, no guarding, masses, rebound tenderness, or HSM. Extremities: No clubbing, cyanosis, or edema. Right lateral foot with more prominence compared to left lateral foot. Neurological: Alert and oriented. Psychiatric: Normal mood and affect. Behavior is normal.     Patient Care Team:  Jackson Fleming MD as PCP - General (Internal Medicine)  Allison Loja MD (Hematology and Oncology)  Alison Snyder MD (Dermatology)  Mitchel Somers MD (Gastroenterology)   Dr. Nando Silva and Sintia Leon NP (Neurology)        Advice/Referrals/Counseling   Education and counseling provided:  Are appropriate based on today's review and evaluation    Assessment/Plan       ICD-10-CM ICD-9-CM    1. Medicare annual wellness visit, initial Z00.00 V70.0    2. Essential hypertension I10 401.9    3. Mixed hyperlipidemia E78.2 272.2    4. Impacted cerumen of right ear H61.21 380.4 IN REMOVAL IMPACTED CERUMEN IRRIGATION/LVG UNILAT   5. Foot deformity, acquired, right M21.961 736.70 XR FOOT RT MIN 3 V   6. Malignant neoplasm of right breast, stage 1, estrogen receptor positive (HCC) C50.911 174.9     Z17.0 V86.0    7. Osteoporosis, unspecified osteoporosis type, unspecified pathological fracture presence M81.0 733.00    8. Diverticulosis of colon K57.30 562.10    9. Tubular adenoma of colon D12.6 211.3    10. Gastroesophageal reflux disease without esophagitis K21.9 530.81    11. Screening for depression Z13.89 V79.0    12. Advance directive on file Z78.9 V49.89      Discussed recent lab results with patient. Normal CMP, CBC, A1c, TSH, cholesterol, and vitamin D level.     Diagnoses and all orders for this visit: 1. Medicare annual wellness visit, initial    2. Essential hypertension  Controlled. Continue amlodipine 2.5 mg daily. Has variable BP, at times running high with SBP in 150's. Patient Instructions  1. Check your blood pressure 2-3 times a week. Write the down the numbers. 2.  Bring blood pressure record with you to your next clinic visit. 3. Mixed hyperlipidemia  Controlled. Continue atorvastatin 10 mg daily. 4. Impacted cerumen of right ear  -     VT REMOVAL IMPACTED CERUMEN IRRIGATION/LVG UNILAT    5. Foot deformity, acquired, right  Rule out new fracture or dislocation. -     XR FOOT RT MIN 3 V; Future    6. Malignant neoplasm of right breast, stage 1, estrogen receptor positive (HCC)  On Arimidex. Follow up with Dr. Maria A Gaticas. 7. Osteoporosis, unspecified osteoporosis type, unspecified pathological fracture presence  No longer on Prolia. Continue exercise, calcium, and vitamin D.    8. Diverticulosis of colon    9. Tubular adenoma of colon  To have repeat colonoscopy in 3 years. 10. Gastroesophageal reflux disease without esophagitis  Controlled on ranitidine. 11. Screening for depression    12. Advance directive on file      Follow-up Disposition:  Return in about 3 months (around 11/21/2018), or if symptoms worsen or fail to improve, for HTN. Patient seen and had Medicare Annual Wellness Exam; Wellness Schedule printed, reviewed, and given to patient.

## 2018-08-21 NOTE — MR AVS SNAPSHOT
[address] [address] [phone] Patient: Kay Miguel MRN: [id number] Visit Information Date & Time Provider Department Dept. Phone Encounter #  
 8/21/2018  2:00 PM Gilbert Bojorquez MD [facility] Internal Medicine of [address] [place], [place] [id number] Follow-up Instructions Return in about 3 months (around 11/21/2018), or if symptoms worsen or fail to improve, for HTN. Upcoming Health Maintenance Date Due  
 BREAST CANCER SCRN MAMMOGRAM 6/6/2018 Influenza Age 5 to Adult 8/1/2018 GLAUCOMA SCREENING Q2Y 3/27/2019 MEDICARE YEARLY EXAM 8/22/2019 COLONOSCOPY 4/27/2023 DTaP/Tdap/Td series (3 - Td) 5/9/2026 Allergies as of 8/21/2018  Review Complete On: 8/21/2018 By: Gilbert Bojorquez MD  
  
 Severity Noted Reaction Type Reactions Prilosec [Omeprazole]  02/07/2011    Other (comments) Leg cramps Zinc  07/17/2015    Swelling Jewelry Current Immunizations  Reviewed on 6/7/2017 Name Date Influenza High Dose Vaccine PF 10/12/2015, 10/9/2014, 10/3/2013 Pneumococcal Conjugate (PCV-13) 6/7/2017 TDAP Vaccine 6/6/2006 Td, Adsorbed PF 5/9/2016 ZZZ-RETIRED (DO NOT USE) Pneumococcal Vaccine (Unspecified Type) 9/21/2011 Zoster 10/28/2011 Not reviewed this visit You Were Diagnosed With   
  
 Codes Comments Medicare annual wellness visit, initial    -  Primary ICD-10-CM: Z00.00 ICD-9-CM: V70.0 Essential hypertension     ICD-10-CM: I10 
ICD-9-CM: 401.9 Mixed hyperlipidemia     ICD-10-CM: E78.2 ICD-9-CM: 272.2 Impacted cerumen of right ear     ICD-10-CM: H61.21 ICD-9-CM: 380.4 Foot deformity, acquired, right     ICD-10-CM: M21.961 ICD-9-CM: 736.70 Malignant neoplasm of right breast, stage 1, estrogen receptor positive ([facility] [address]     ICD-10-CM: C50.911, Z17.0 ICD-9-CM: 174.9, V86.0 Osteoporosis, unspecified osteoporosis type, unspecified pathological fracture presence     ICD-10-CM: M81.0 ICD-9-CM: 733.00 Diverticulosis of colon     ICD-10-CM: K57.30 ICD-9-CM: 562.10 Screening for depression     ICD-10-CM: Z13.89 ICD-9-CM: V79.0 Advance directive on file     ICD-10-CM: Z78.9 ICD-9-CM: V49.89 Vitals BP Pulse Temp Resp Height(growth percentile) Weight(growth percentile) 132/65 (BP 1 Location: Left arm, BP Patient Position: Sitting) 72 98.3 °F (36.8 °C) (Oral) 16 5' 3\" (1.6 m) 125 lb (56.7 kg) SpO2 BMI OB Status Smoking Status 97% 22.14 kg/m2 Postmenopausal Never Smoker Vitals History BMI and BSA Data Body Mass Index Body Surface Area  
 22.14 kg/m 2 1.59 m 2 Preferred Pharmacy Pharmacy Name Phone [facility] [address] AT [address] [phone] Your Updated Medication List  
  
   
This list is accurate as of 8/21/18  3:07 PM.  Always use your most recent med list. amLODIPine 2.5 mg tablet Commonly known as:  Lennis Eagles TAKE 1 TABLET BY MOUTH DAILY FOR HIGH BLOOD PRESSURE  
  
 anastrozole 1 mg tablet Commonly known as:  ARIMIDEX Take 1 Tab by mouth daily. aspirin delayed-release 81 mg tablet Take  by mouth daily. atorvastatin 10 mg tablet Commonly known as:  LIPITOR  
TAKE 1 TABLET BY MOUTH EVERY DAY  
  
 raNITIdine 150 mg tablet Commonly known as:  ZANTAC Take 150 mg by mouth daily. We Performed the Following OR REMOVAL IMPACTED CERUMEN IRRIGATION/LVG UNILAT [67927 CPT(R)] Follow-up Instructions Return in about 3 months (around 11/21/2018), or if symptoms worsen or fail to improve, for HTN. To-Do List   
 08/21/2018 Imaging:  XR FOOT RT MIN 3 V Patient Instructions Patient Instructions 1. Check your blood pressure 2-3 times a week. Write the down the numbers. 2.  Bring blood pressure record with you to your next clinic visit. Schedule of Personalized Health Plan The best way to stay healthy is to live a healthy lifestyle. A healthy lifestyle includes regular exercise, eating a well-balanced diet, keeping a healthy weight and not smoking. Regular physical exams and screening tests are another important way to take care of yourself. Preventive exams provided by health care providers can find health problems early when treatment works best and can keep you from getting certain diseases or illnesses. Preventive services include exams, lab tests, screenings, shots, monitoring and information to help you take care of your own health. All people over 65 should have a pneumonia shot. Pneumonia shots are usually only needed once in a lifetime unless your doctor decides differently. All people over 65 should have a yearly flu shot. People over 65 are at medium to high risk for Hepatitis B. Three shots are needed for complete protection. In addition to your physical exam, some screening tests are recommended: 
 
 
Screening for Breast Cancer is recommended yearly with a mammogram. 
 
Screening for Cervical Cancer is recommended every two years (annually for certain risk factors, such as previous history of STD or abnormal PAP in past 7 years), with a Pelvic Exam with PAP 
 
 Here is a list of your current Health Maintenance items with a due date: 
Health Maintenance Topic Date Due  
 BREAST CANCER SCRN MAMMOGRAM  06/06/2018  Influenza Age 5 to Adult  08/01/2018  GLAUCOMA SCREENING Q2Y  03/27/2019  MEDICARE YEARLY EXAM  08/22/2019  COLONOSCOPY  04/27/2023  DTaP/Tdap/Td series (3 - Td) 05/09/2026  Hepatitis C Screening  Completed  ZOSTER VACCINE AGE 60>  Completed  Pneumococcal 65+ High/Highest Risk  Completed [facility] SERVICES! Dear Lacy Stearns: Thank you for requesting a [employer] account. Our records indicate that you already have an active [employer] account. You can access your account anytime at [email or web address] Did you know that you can access your hospital and ER discharge instructions at any time in [employer]? You can also review all of your test results from your hospital stay or ER visit. Additional Information If you have questions, please visit the Frequently Asked Questions section of the [employer] website at [email or web address] Remember, [employer] is NOT to be used for urgent needs. For medical emergencies, dial 911. Now available from your iPhone and Android! Please provide this summary of care documentation to your next provider. Your primary care clinician is listed as Gabrielle Walsh. If you have any questions after today's visit, please call [phone].

## 2018-08-21 NOTE — PROGRESS NOTES
This is a \"Welcome to [employer]"  Initial Preventive Physical Examination (IPPE) providing Personalized Prevention Plan Services (Performed in the first 12 months of enrollment)    I have reviewed the patient's medical history in detail and updated the computerized patient record. History   Klarissa Lin is a 68 y.o. female. Presents for Welcome to [facility] Visit and annual physical exam.      She has a history of breast cancer in 2010 (follows with Dr. Aline Bunch every 6 mons), osteoporosis on Prolia infusions, hyperlipidemia, history of ocular migraine headaches, diverticulosis, and eczema. She does not have Medicare as her primary insurance. She complains sensation of bladder pressure with some urinary urgency. Denies dysuria or frequency. She ocmplains of wax at left ear.       Hospitalized at [address] from 4/10-4/14/17 with first episode of acute pancreatitis. Since discharge, has stopped all alcohol and stays on a low-fat diet. Had eye exam with Dr. Atiya Green at [facility] last month. Breast cancer  Diagnosed 5/26/10; stage Ia. She is s/p R lumpectomy, radiation therapy, and tamoxifen/Arimidex. Last mammogram 5/15. Follows with Dr. Aline Bunch every 6 months.     Osteoporosis  Receives Prolia infusion every 6 months at office of Dr. Aline Bunch; has been on for about 3 yrs. Exercises regularly by walking 30 mins 3 times a week. Had bone density. Was recently taken off      Ocular Migraine Headaches  Typically experiences dull ache at occipital area. Sometimes sees white spots flashing without headache.         Soc Hx  . Has 2 living adult children (both live in [place]) and 4 grandchildren. She works as the  for [employer] of the Department of [employer] and [employer]. Never smoker. Drinks alcohol infrequently.  Denies recreational drug use.     Saw GI Dr Tariq Alberto for hemorrhoid banding in office 3/18;  and Dr Suzan Vasquez- [facility] heart plapitations, had stress test, was negative. Had mammogram at [facility]  Used to see a docto  Has appt to see Dr. Shirley Morris in Sept ffor pancreatitis. Health Maintenance   Flu vaccine: willing to have today  Pneumonia vaccine: PPSV 9/21/11  Tetanus vaccine: Tdap 6/6/06  Zoster vaccine: 10/28/11  Colonoscopy: 2 yrs ago (Dr. Jorge Huerta); her father had colon cancer at age 66. Mammogram: 4/18  Bone density: 3 yrs ago. Eye exam: 3/18, Dr. Ina Diaz, [facility]; had cataract surgery (Dr. Marisa Greene)  Lipids: 10/19/14 (total 252, )  A1c: due for this; will check today  Advanced Directives: has at home; will bring in a copy   End of Life: has a living will at home; will bring in a copy     ROS    Breast cancer  Diagnosed 5/26/10; stage Ia. She is s/p R lumpectomy, radiation therapy, and tamoxifen/Arimidex. Last mammogram 5/15. Follows with Dr. Alayna Pedro every 6 months.     Osteoporosis  Receives Prolia infusion every 6 months at office of Dr. Alayna Pedro; has been on for about 3 yrs. Exercises regularly by walking 30 mins 3 times a week.     Ocular Migraine Headaches  Typically experiences dull ache at occipital area. Sometimes sees white spots flashing without headache.     Soc Hx  . Has 2 living adult children (both live in [place]) and 4 grandchildren. Retired 8//17. 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 Never smoker. Drinks alcohol socially. Denies recreational drug use. Joined the      Health Maintenance   Flu vaccine: willing to have today  Pneumonia vaccine: PPSV 9/21/11  Tetanus vaccine: Tdap 6/6/06  Zoster vaccine: 10/28/11  Colonoscopy: 4/27/18, Dr. Shirley Morris, tubular adenoma, repeat in 3 yrs (2021);  her father had colon cancer at age 78. Mammogram: 5/15  Bone density: 3 yrs ago.    Eye exam: 6 months ago; had cataract surgery (Dr. Marisa Greene)  Lipids: 10/19/14 (total 252, )  A1c: due for this; will check today  Advanced Directives: has at home; will bring in a copy End of Life: has a living will at home; will bring in a copy     ROS    Past Medical History:   Diagnosis Date    breast cancer May 2010    R breast, stage IA; T1b,pN0, M0; Dr. Radha Godinez HTN (hypertension)     Hypercholesterolemia     Osteoporosis     Pancreatitis       Past Surgical History:   Procedure Laterality Date    COLONOSCOPY N/A 2018    COLONOSCOPY [facility] by Izaiah Posada MD at [facility]; tubular adenoma at cecum, hyperplastic polyp at rectum, hemorrhoids, diverticulosis, repeat in 3 yrs    HX BREAST LUMPECTOMY      right     Current Outpatient Prescriptions   Medication Sig Dispense Refill    aspirin delayed-release 81 mg tablet Take  by mouth daily.  atorvastatin (LIPITOR) 10 mg tablet TAKE 1 TABLET BY MOUTH EVERY DAY 90 Tab 0    amLODIPine (NORVASC) 2.5 mg tablet TAKE 1 TABLET BY MOUTH DAILY FOR HIGH BLOOD PRESSURE 90 Tab 0    raNITIdine (ZANTAC) 150 mg tablet Take 150 mg by mouth daily.  anastrozole (ARIMIDEX) 1 mg tablet Take 1 Tab by mouth daily.        Allergies   Allergen Reactions    Prilosec [Omeprazole] Other (comments)     Leg cramps    Zinc Swelling     Jewelry     Family History   Problem Relation Age of Onset    Osteoporosis Mother     Heart Failure Mother     Heart Disease Mother     Colon Cancer Father     Cancer Father      lung & colon    Cancer Sister      skin - BCCA     Social History   Substance Use Topics    Smoking status: Never Smoker    Smokeless tobacco: Never Used    Alcohol use Yes      Comment: rarely     Diet, Lifestyle: {Diet, Lifestyle:08496}    Exercise level: {exercise level:30510}    Depression Risk Screen     PHQ over the last two weeks 2018   Little interest or pleasure in doing things Not at all   Feeling down, depressed, irritable, or hopeless Not at all   Total Score PHQ 2 0     Alcohol Risk Screen   {screenin}    Functional Ability and Level of Safety     Hearing Loss   {Desc; hearing loss:41442::\"Hearing is good. \"}    Activities of Daily Living   {ADL:16914::\"Self-care\"}     Fall Risk Screen     Fall Risk Assessment, last 12 mths 8/21/2018   Able to walk? Yes   Fall in past 12 months? No     Abuse Screen   {Abuse Screen:19752::\"Patient is not abused\"}    Review of Systems   {ros - complete:97997}    Physical Examination     Visit Vitals    /65 (BP 1 Location: Left arm, BP Patient Position: Sitting)    Pulse 72    Temp 98.3 °F (36.8 °C) (Oral)    Resp 16    Ht 5' 3\" (1.6 m)    Wt 125 lb (56.7 kg)    SpO2 97%    BMI 22.14 kg/m2       General: Well-developed and well-nourished, no distress. HEENT:  Head normocephalic/atraumatic, no scleral icterus  Lungs:  Clear to ausculation bilaterally. Good air movement. Heart:  Regular rate and rhythm, normal S1 and S2, no murmur, gallop, or rub  Extremities: No clubbing, cyanosis, or edema. Neurological: Alert and oriented. Psychiatric: Normal mood and affect. Behavior is normal.          EKG Screening: {ekg findings:728073::\"normal EKG, normal sinus rhythm\",\"unchanged from previous tracings\"}. Patient Care Team:  Deandre Hicks MD as PCP - General (Internal Medicine)  Val Rivas MD (Hematology and Oncology)  Misa Vincent MD (Gastroenterology)  Hnerik Haile MD (Dermatology)  Salma Jackson MD (Ophthalmology)  Zaki Smith RN as Nurse Navigator     End-of-life planning  Advanced Directive discussed and documented: {YES/NO:67395}    Assessment/Plan   Education and counseling provided:  {Education List, choose as appropriate:19754::\"Are appropriate based on today's review and evaluation\"}  {Assessment and Plan:18561}. I have discussed the diagnosis with the patient and the intended plan as seen in the above orders. Patient is in agreement. The patient has received an after-visit summary and questions were answered concerning future plans.   I have discussed medication side effects and warnings with the patient as well.

## 2018-08-21 NOTE — PROGRESS NOTES
Shyam Ginger  Identified pt with two pt identifiers(name and ). Chief Complaint   Patient presents with   Chace Yoder Annual Wellness Visit    Results       1. Have you been to the ER, urgent care clinic since your last visit? Hospitalized since your last visit? NO    2. Have you seen or consulted any other health care providers outside of the [facility] since your last visit? Include any pap smears or colon screening. Saw GI Dr Lori Schaefer for hemorrhoid banding and Dr Nirav Stevens)  Had mammogram at [address] provider has been notified of reason for visit, vitals and flowsheets obtained on patients. Advanced directives on file.     Reviewed record In preparation for visit, huddled with provider and have obtained necessary documentation      Health Maintenance Due   Topic    BREAST CANCER SCRN MAMMOGRAM     MEDICARE YEARLY EXAM     Influenza Age 5 to Adult        Wt Readings from Last 3 Encounters:   18 125 lb (56.7 kg)   18 125 lb (56.7 kg)   12/15/17 124 lb (56.2 kg)     Temp Readings from Last 3 Encounters:   18 98.3 °F (36.8 °C) (Oral)   18 97.5 °F (36.4 °C)   18 98.1 °F (36.7 °C) (Oral)     BP Readings from Last 3 Encounters:   18 132/65   18 153/74   18 150/90     Pulse Readings from Last 3 Encounters:   18 72   18 68   18 71     Vitals:    18 1405   BP: 132/65   Pulse: 72   Resp: 16   Temp: 98.3 °F (36.8 °C)   TempSrc: Oral   SpO2: 97%   Weight: 125 lb (56.7 kg)   Height: 5' 3\" (1.6 m)   PainSc:   0 - No pain         Learning Assessment:  :     Learning Assessment 10/9/2014   PRIMARY LEARNER Patient   HIGHEST LEVEL OF EDUCATION - PRIMARY LEARNER  GRADUATED HIGH SCHOOL OR GED   BARRIERS PRIMARY LEARNER NONE   CO-LEARNER CAREGIVER No   PRIMARY LANGUAGE ENGLISH   LEARNER PREFERENCE PRIMARY LISTENING   ANSWERED BY patient   RELATIONSHIP SELF       Depression Screening:  :     PHQ over the last two weeks 8/21/2018   Little interest or pleasure in doing things Not at all   Feeling down, depressed, irritable, or hopeless Not at all   Total Score PHQ 2 0       Fall Risk Assessment:  :     Fall Risk Assessment, last 12 mths 8/21/2018   Able to walk? Yes   Fall in past 12 months? No       Abuse Screening:  :     Abuse Screening Questionnaire 8/21/2018 10/9/2014   Do you ever feel afraid of your partner? N N   Are you in a relationship with someone who physically or mentally threatens you? N N   Is it safe for you to go home? Y Y       ADL Screening:  :     ADL Assessment 8/21/2018   Feeding yourself No Help Needed   Getting from bed to chair No Help Needed   Getting dressed No Help Needed   Bathing or showering No Help Needed   Walk across the room (includes cane/walker) No Help Needed   Using the telphone No Help Needed   Taking your medications No Help Needed   Preparing meals No Help Needed   Managing money (expenses/bills) No Help Needed   Moderately strenuous housework (laundry) No Help Needed   Shopping for personal items (toiletries/medicines) No Help Needed   Shopping for groceries No Help Needed   Driving No Help Needed   Climbing a flight of stairs No Help Needed   Getting to places beyond walking distances No Help Needed           Right ears flushed without difficulty, small amt of cerumen removed. Pt tolerated procedure well. Medication reconciliation up to date and corrected with patient at this time.

## 2018-08-21 NOTE — ACP (ADVANCE CARE PLANNING)
Advance Care Planning (ACP) Provider Conversation Snapshot    Date of ACP Conversation: 08/21/18  Persons included in Conversation:  patient  Length of ACP Conversation in minutes:  <16 minutes (Non-Billable)    Authorized Decision Maker (if patient is incapable of making informed decisions):    This person is:   Healthcare Agent/Medical Power of  under Advance Directive          For Patients with Decision Making Capacity:   Values/Goals: Exploration of values, goals, and preferences if recovery is not expected, even with continued medical treatment in the event of:  Imminent death    Conversation Outcomes / Follow-Up Plan:   Reviewed existing Advance Directive

## 2018-08-21 NOTE — PATIENT INSTRUCTIONS
Patient Instructions  1. Check your blood pressure 2-3 times a week. Write the down the numbers. 2.  Bring blood pressure record with you to your next clinic visit. Schedule of Personalized Health Plan    The best way to stay healthy is to live a healthy lifestyle. A healthy lifestyle includes regular exercise, eating a well-balanced diet, keeping a healthy weight and not smoking. Regular physical exams and screening tests are another important way to take care of yourself. Preventive exams provided by health care providers can find health problems early when treatment works best and can keep you from getting certain diseases or illnesses. Preventive services include exams, lab tests, screenings, shots, monitoring and information to help you take care of your own health. All people over 65 should have a pneumonia shot. Pneumonia shots are usually only needed once in a lifetime unless your doctor decides differently. All people over 65 should have a yearly flu shot. People over 65 are at medium to high risk for Hepatitis B. Three shots are needed for complete protection. In addition to your physical exam, some screening tests are recommended:    Bone mass measurement (dexa scan) is recommended every two years  Diabetes Mellitus screening is recommended every year. Glaucoma is an eye disease caused by high pressure in the eye. An eye exam is recommended every year. Cardiovascular screening tests that check your cholesterol and other blood fat (lipid) levels are recommended every five years. Colorectal Cancer screening tests help to find pre-cancerous polyps (growths in the colon) so they can be removed before they turn into cancer. Tests ordered for screening depend on your personal and family history risk factors.     Screening for Breast Cancer is recommended yearly with a mammogram.    Screening for Cervical Cancer is recommended every two years (annually for certain risk factors, such as previous history of STD or abnormal PAP in past 7 years), with a Pelvic Exam with PAP    Here is a list of your current Health Maintenance items with a due date:  Health Maintenance   Topic Date Due    BREAST CANCER SCRN MAMMOGRAM  06/06/2018    Influenza Age 5 to Adult  08/01/2018    GLAUCOMA SCREENING Q2Y  03/27/2019    MEDICARE YEARLY EXAM  08/22/2019    COLONOSCOPY  04/27/2023    DTaP/Tdap/Td series (3 - Td) 05/09/2026    Hepatitis C Screening  Completed    ZOSTER VACCINE AGE 60>  Completed    Pneumococcal 65+ High/Highest Risk  Completed

## 2018-10-07 DIAGNOSIS — I10 ESSENTIAL HYPERTENSION: ICD-10-CM

## 2018-10-07 RX ORDER — AMLODIPINE BESYLATE 2.5 MG/1
TABLET ORAL
Qty: 90 TAB | Refills: 0 | Status: SHIPPED | OUTPATIENT
Start: 2018-10-07 | End: 2019-01-04 | Stop reason: SDUPTHER

## 2019-01-04 DIAGNOSIS — I10 ESSENTIAL HYPERTENSION: ICD-10-CM

## 2019-01-04 RX ORDER — AMLODIPINE BESYLATE 2.5 MG/1
TABLET ORAL
Qty: 90 TAB | Refills: 0 | Status: SHIPPED | OUTPATIENT
Start: 2019-01-04 | End: 2019-03-20 | Stop reason: SDUPTHER

## 2019-03-20 DIAGNOSIS — I10 ESSENTIAL HYPERTENSION: ICD-10-CM

## 2019-03-20 RX ORDER — AMLODIPINE BESYLATE 2.5 MG/1
TABLET ORAL
Qty: 90 TAB | Refills: 0 | Status: SHIPPED | OUTPATIENT
Start: 2019-03-20 | End: 2019-06-30 | Stop reason: SDUPTHER

## 2019-04-12 ENCOUNTER — HOSPITAL ENCOUNTER (OUTPATIENT)
Dept: GENERAL RADIOLOGY | Age: 74
Discharge: HOME | End: 2019-04-12
Payer: MEDICARE

## 2019-04-12 ENCOUNTER — TELEPHONE (OUTPATIENT)
Dept: INTERNAL MEDICINE CLINIC | Facility: CLINIC | Age: 74
End: 2019-04-12

## 2019-04-12 ENCOUNTER — OFFICE VISIT (OUTPATIENT)
Dept: INTERNAL MEDICINE CLINIC | Facility: CLINIC | Age: 74
End: 2019-04-12

## 2019-04-12 VITALS
HEIGHT: 63 IN | TEMPERATURE: 98 F | WEIGHT: 121.8 LBS | DIASTOLIC BLOOD PRESSURE: 75 MMHG | BODY MASS INDEX: 21.58 KG/M2 | SYSTOLIC BLOOD PRESSURE: 125 MMHG | OXYGEN SATURATION: 98 % | HEART RATE: 64 BPM | RESPIRATION RATE: 18 BRPM

## 2019-04-12 DIAGNOSIS — R05.8 PRODUCTIVE COUGH: ICD-10-CM

## 2019-04-12 DIAGNOSIS — J20.9 ACUTE BRONCHITIS, UNSPECIFIED ORGANISM: Primary | ICD-10-CM

## 2019-04-12 DIAGNOSIS — Z20.89 EXPOSURE TO PNEUMONIA: ICD-10-CM

## 2019-04-12 DIAGNOSIS — J20.9 ACUTE BRONCHITIS, UNSPECIFIED ORGANISM: ICD-10-CM

## 2019-04-12 PROCEDURE — 71046 X-RAY EXAM CHEST 2 VIEWS: CPT

## 2019-04-12 RX ORDER — BENZONATATE 200 MG/1
200 CAPSULE ORAL
Qty: 30 CAP | Refills: 0 | Status: SHIPPED | OUTPATIENT
Start: 2019-04-12 | End: 2019-04-19

## 2019-04-12 RX ORDER — DOXYCYCLINE 100 MG/1
100 TABLET ORAL 2 TIMES DAILY
Qty: 20 TAB | Refills: 0 | Status: SHIPPED | OUTPATIENT
Start: 2019-04-12 | End: 2019-04-22

## 2019-04-12 NOTE — PROGRESS NOTES
HPI  Tho Mendez is a 69y.o. year old female patient of Erlinda Lara MD who presents with c/o persistent cough. Pt has history of has Malignant neoplasm of right breast, stage 1, estrogen receptor positive ([address] Osteoporosis, Ocular migraine, Diverticulosis of colon, Eczema, Advance directive on file, Mixed hyperlipidemia, Acute pancreatitis, and Tubular adenoma of colon on their problem list..    Pt c/o cough since she returned from [place] 1.5 weeks ago last Wed, cough started last Friday. Initially had sore throat. She [facility] told virus, prescribed tessalon pearls? And robitussin, just makes her sleepy not helping. Cough keeping her up at night. Cough is getting worse, productive of clear sputum and has low grade fever. Denies SOB or wheezing. Denies asthma or copd, denies smoking hx. Has hx of breast ca, on arimidex. Her  was seen yesterday for similar c/o and sent to the ED and dx with bilat pneumonia and admitted to hospital.   C/o frequent nasal discharge, blowing her nose a lot. Patient Active Problem List   Diagnosis Code    Malignant neoplasm of right breast, stage 1, estrogen receptor positive (HCC) C50.911, Z17.0    Osteoporosis M81.0    Ocular migraine G43. 80    Diverticulosis of colon K57.30    Eczema L30.9    Advance directive on file Z78.9    Mixed hyperlipidemia E78.2    Acute pancreatitis K85.90    Tubular adenoma of colon D12.6     Past Medical History:   Diagnosis Date    breast cancer May 2010    R breast, stage IA; T1b,pN0, M0; Dr. Micheal Venegas HTN (hypertension)     Hypercholesterolemia     Osteoporosis     Pancreatitis      Past Surgical History:   Procedure Laterality Date    COLONOSCOPY N/A 4/27/2018    COLONOSCOPY performed by Oscar Rivero MD at [facility] ENDOSCOPY; tubular adenoma at cecum, hyperplastic polyp at rectum, hemorrhoids, diverticulosis, repeat in 3 yrs    HX BREAST LUMPECTOMY right     Social History     Socioeconomic History    Marital status:      Spouse name: Not on file    Number of children: Not on file    Years of education: Not on file    Highest education level: Not on file   Tobacco Use    Smoking status: Never Smoker    Smokeless tobacco: Never Used   Substance and Sexual Activity    Alcohol use: Yes     Comment: rarely     Family History   Problem Relation Age of Onset    Osteoporosis Mother     Heart Failure Mother     Heart Disease Mother     Colon Cancer Father     Cancer Father         lung & colon    Cancer Sister         skin - [facility]     Allergies   Allergen Reactions    Prilosec [Omeprazole] Other (comments)     Leg cramps    Zinc Swelling     Jewelry       MEDICATIONS  Current Outpatient Medications   Medication Sig    amLODIPine (NORVASC) 2.5 mg tablet TAKE 1 TABLET BY MOUTH DAILY FOR HIGH BLOOD PRESSURE    atorvastatin (LIPITOR) 10 mg tablet TAKE 1 TABLET BY MOUTH EVERY DAY    aspirin delayed-release 81 mg tablet Take  by mouth daily.  raNITIdine (ZANTAC) 150 mg tablet Take 150 mg by mouth daily.  anastrozole (ARIMIDEX) 1 mg tablet Take 1 Tab by mouth daily. No current facility-administered medications for this visit. REVIEW OF SYSTEMS  Per HPI        Visit Vitals  /75 (BP 1 Location: Left arm, BP Patient Position: Sitting)   Pulse 64   Temp 98 °F (36.7 °C) (Oral)   Resp 18   Ht 5' 3\" (1.6 m)   Wt 121 lb 12.8 oz (55.2 kg)   SpO2 98%   BMI 21.58 kg/m²         General: Well-developed, well-nourished. In no distress. A&O x 3. Head: Normocephalic, atraumatic. Eyes: Conjunctiva clear. Pupils equal, round, reactive to light. Extraocular movements intact. Ears/Nose: TM's and ear canals normal bilaterally. Nares normal. Septum midline. Normal nasal mucosa. No drainage or sinus tenderness. Mouth/Throat: Lips, mucosa, and tongue normal. Oropharynx erythematous.    Neck: Supple, symmetrical, trachea midline, no lymphadenopathy, no carotid bruits, no JVD, thyroid: not enlarged, symmetric, no tenderness/mass/nodules. Lungs: Clear to auscultation bilaterally. No crackles or wheezes. No use of accessory muscles. Speaks in full sentences without SOB. Frequent cough. Chest Wall: No tenderness or deformity. Heart: RRR, normal S1 and S2, no murmur, click, rub, or gallop. Skin: No rashes or lesions. Neurovasc: No edema appreciated. Musculoskeletal: Gait normal.   Psychiatric: Normal mood and affect. Behavior is normal.       ASSESSMENT and PLAN  Diagnoses and all orders for this visit:    1. Acute bronchitis, unspecified organism  -     XR CHEST PA LAT; Future  -Will wait for CXR to choose antibiotic. Treat as bacterial given duration and worsening symptoms. [facility]. Recommend start Mucinex daily. 2. Productive cough  -     XR CHEST PA LAT; Future  -     benzonatate (TESSALON) 200 mg capsule; Take 1 Cap by mouth three (3) times daily as needed for Cough for up to 7 days. 3. Exposure to pneumonia  -     XR CHEST PA LAT; Future            Patient Instructions     We will call with your Xray results and send in an antibiotic at that time. Please contact our office if your symptoms worsen or do not improve. Please call 911 or go directly to the Emergency Department if you develop shortness of breath, chest pain, difficulty breathing or worsening of your symptoms. Bronchitis: Care Instructions  Your Care Instructions    Bronchitis is inflammation of the bronchial tubes, which carry air to the lungs. The tubes swell and produce mucus, or phlegm. The mucus and inflamed bronchial tubes make you cough. You may have trouble breathing. Most cases of bronchitis are caused by viruses like those that cause colds. Antibiotics usually do not help and they may be harmful. Bronchitis usually develops rapidly and lasts about 2 to 3 weeks in otherwise healthy people.   Follow-up care is a key part of your treatment and safety. Be sure to make and go to all appointments, and call your doctor if you are having problems. It's also a good idea to know your test results and keep a list of the medicines you take. How can you care for yourself at home? · Take all medicines exactly as prescribed. Call your doctor if you think you are having a problem with your medicine. · Get some extra rest.  · Take an over-the-counter pain medicine, such as acetaminophen (Tylenol), ibuprofen (Advil, Motrin), or naproxen (Aleve) to reduce fever and relieve body aches. Read and follow all instructions on the label. · Do not take two or more pain medicines at the same time unless the doctor told you to. Many pain medicines have acetaminophen, which is Tylenol. Too much acetaminophen (Tylenol) can be harmful. · Take an over-the-counter cough medicine that contains dextromethorphan to help quiet a dry, hacking cough so that you can sleep. Avoid cough medicines that have more than one active ingredient. Read and follow all instructions on the label. · Breathe moist air from a humidifier, hot shower, or sink filled with hot water. The heat and moisture will thin mucus so you can cough it out. · Do not smoke. Smoking can make bronchitis worse. If you need help quitting, talk to your doctor about stop-smoking programs and medicines. These can increase your chances of quitting for good. When should you call for help? Call 911 anytime you think you may need emergency care.  For example, call if:    · You have severe trouble breathing.    Call your doctor now or seek immediate medical care if:    · You have new or worse trouble breathing.     · You cough up dark brown or bloody mucus (sputum).     · You have a new or higher fever.     · You have a new rash.    Watch closely for changes in your health, and be sure to contact your doctor if:    · You cough more deeply or more often, especially if you notice more mucus or a change in the color of your mucus.     · You are not getting better as expected. Where can you learn more? Go to http://scout-catalina.info/. Enter H333 in the search box to learn more about \"Bronchitis: Care Instructions. \"  Current as of: September 5, 2018  Content Version: 11.9  © 4708-6006 [employer]. Care instructions adapted under license by [employer] (which disclaims liability or warranty for this information). If you have questions about a medical condition or this instruction, always ask your healthcare professional. [address] any warranty or liability for your use of this information. Cough: Care Instructions  Your Care Instructions    A cough is your body's response to something that bothers your throat or airways. Many things can cause a cough. You might cough because of a cold or the flu, bronchitis, or asthma. Smoking, postnasal drip, allergies, and stomach acid that backs up into your throat also can cause coughs. A cough is a symptom, not a disease. Most coughs stop when the cause, such as a cold, goes away. You can take a few steps at home to cough less and feel better. Follow-up care is a key part of your treatment and safety. Be sure to make and go to all appointments, and call your doctor if you are having problems. It's also a good idea to know your test results and keep a list of the medicines you take. How can you care for yourself at home? · Drink lots of water and other fluids. This helps thin the mucus and soothes a dry or sore throat. Honey or lemon juice in hot water or tea may ease a dry cough. · Take cough medicine as directed by your doctor. · Prop up your head on pillows to help you breathe and ease a dry cough. · Try cough drops to soothe a dry or sore throat. Cough drops don't stop a cough. Medicine-flavored cough drops are no better than candy-flavored drops or hard candy. · Do not smoke.  Avoid secondhand smoke. If you need help quitting, talk to your doctor about stop-smoking programs and medicines. These can increase your chances of quitting for good. When should you call for help? Call 911 anytime you think you may need emergency care. For example, call if:    · You have severe trouble breathing.    Call your doctor now or seek immediate medical care if:    · You cough up blood.     · You have new or worse trouble breathing.     · You have a new or higher fever.     · You have a new rash.    Watch closely for changes in your health, and be sure to contact your doctor if:    · You cough more deeply or more often, especially if you notice more mucus or a change in the color of your mucus.     · You have new symptoms, such as a sore throat, an earache, or sinus pain.     · You do not get better as expected. Where can you learn more? Go to http://scoutWahandacatalina.info/. Enter D279 in the search box to learn more about \"Cough: Care Instructions. \"  Current as of: September 5, 2018  Content Version: 11.9  © 5523-2779 [employer]. Care instructions adapted under license by [employer] (which disclaims liability or warranty for this information). If you have questions about a medical condition or this instruction, always ask your healthcare professional. [address] any warranty or liability for your use of this information. Please keep your follow-up appointment with Sarah Beth Diallo MD.         Health Maintenance Due   Topic Date Due    Shingrix Vaccine Age 49> (1 of 2) 02/17/1996    Pneumococcal 65+ years (2 of 2 - PPSV23) 06/07/2018    GLAUCOMA SCREENING Q2Y  03/27/2019       I have discussed the diagnosis with the patient and the intended plan as seen in the above orders. Patient is in agreement. The patient has received an after-visit summary and questions were answered concerning future plans.   I have discussed medication side effects and warnings with the patient as well. Warning signs for the above conditions were discussed including when to call our office or go to the emergency room. The nurse provided the patient and/or family with advanced directive information if needed and encouraged the patient to provide a copy to the office when available.

## 2019-04-12 NOTE — PROGRESS NOTES
Please let pt know xray is negative for pneumonia. I have sent an abx for bronchitis to her pharmacy.

## 2019-04-12 NOTE — PROGRESS NOTES
Verified two patient identifiers, name and . Patient provided with results and Np message. No questions at this time. Advised to take Musinex with Antibiotic.

## 2019-04-12 NOTE — PATIENT INSTRUCTIONS
We will call with your Xray results and send in an antibiotic at that time. Please contact our office if your symptoms worsen or do not improve. Please call 911 or go directly to the Emergency Department if you develop shortness of breath, chest pain, difficulty breathing or worsening of your symptoms. Bronchitis: Care Instructions  Your Care Instructions    Bronchitis is inflammation of the bronchial tubes, which carry air to the lungs. The tubes swell and produce mucus, or phlegm. The mucus and inflamed bronchial tubes make you cough. You may have trouble breathing. Most cases of bronchitis are caused by viruses like those that cause colds. Antibiotics usually do not help and they may be harmful. Bronchitis usually develops rapidly and lasts about 2 to 3 weeks in otherwise healthy people. Follow-up care is a key part of your treatment and safety. Be sure to make and go to all appointments, and call your doctor if you are having problems. It's also a good idea to know your test results and keep a list of the medicines you take. How can you care for yourself at home? · Take all medicines exactly as prescribed. Call your doctor if you think you are having a problem with your medicine. · Get some extra rest.  · Take an over-the-counter pain medicine, such as acetaminophen (Tylenol), ibuprofen (Advil, Motrin), or naproxen (Aleve) to reduce fever and relieve body aches. Read and follow all instructions on the label. · Do not take two or more pain medicines at the same time unless the doctor told you to. Many pain medicines have acetaminophen, which is Tylenol. Too much acetaminophen (Tylenol) can be harmful. · Take an over-the-counter cough medicine that contains dextromethorphan to help quiet a dry, hacking cough so that you can sleep. Avoid cough medicines that have more than one active ingredient. Read and follow all instructions on the label.   · Breathe moist air from a humidifier, hot shower, or sink filled with hot water. The heat and moisture will thin mucus so you can cough it out. · Do not smoke. Smoking can make bronchitis worse. If you need help quitting, talk to your doctor about stop-smoking programs and medicines. These can increase your chances of quitting for good. When should you call for help? Call 911 anytime you think you may need emergency care. For example, call if:    · You have severe trouble breathing.    Call your doctor now or seek immediate medical care if:    · You have new or worse trouble breathing.     · You cough up dark brown or bloody mucus (sputum).     · You have a new or higher fever.     · You have a new rash.    Watch closely for changes in your health, and be sure to contact your doctor if:    · You cough more deeply or more often, especially if you notice more mucus or a change in the color of your mucus.     · You are not getting better as expected. Where can you learn more? Go to http://scout-catalina.info/. Enter H333 in the search box to learn more about \"Bronchitis: Care Instructions. \"  Current as of: September 5, 2018  Content Version: 11.9  © 4272-0191 [employer]. Care instructions adapted under license by [employer] (which disclaims liability or warranty for this information). If you have questions about a medical condition or this instruction, always ask your healthcare professional. [address] any warranty or liability for your use of this information. Cough: Care Instructions  Your Care Instructions    A cough is your body's response to something that bothers your throat or airways. Many things can cause a cough. You might cough because of a cold or the flu, bronchitis, or asthma. Smoking, postnasal drip, allergies, and stomach acid that backs up into your throat also can cause coughs. A cough is a symptom, not a disease.  Most coughs stop when the cause, such as a cold, goes away. You can take a few steps at home to cough less and feel better. Follow-up care is a key part of your treatment and safety. Be sure to make and go to all appointments, and call your doctor if you are having problems. It's also a good idea to know your test results and keep a list of the medicines you take. How can you care for yourself at home? · Drink lots of water and other fluids. This helps thin the mucus and soothes a dry or sore throat. Honey or lemon juice in hot water or tea may ease a dry cough. · Take cough medicine as directed by your doctor. · Prop up your head on pillows to help you breathe and ease a dry cough. · Try cough drops to soothe a dry or sore throat. Cough drops don't stop a cough. Medicine-flavored cough drops are no better than candy-flavored drops or hard candy. · Do not smoke. Avoid secondhand smoke. If you need help quitting, talk to your doctor about stop-smoking programs and medicines. These can increase your chances of quitting for good. When should you call for help? Call 911 anytime you think you may need emergency care. For example, call if:    · You have severe trouble breathing.    Call your doctor now or seek immediate medical care if:    · You cough up blood.     · You have new or worse trouble breathing.     · You have a new or higher fever.     · You have a new rash.    Watch closely for changes in your health, and be sure to contact your doctor if:    · You cough more deeply or more often, especially if you notice more mucus or a change in the color of your mucus.     · You have new symptoms, such as a sore throat, an earache, or sinus pain.     · You do not get better as expected. Where can you learn more? Go to http://scout-catalina.info/. Enter D279 in the search box to learn more about \"Cough: Care Instructions. \"  Current as of: September 5, 2018  Content Version: 11.9  © 9695-4398 [employer].  Care instructions adapted under license by [employer] (which disclaims liability or warranty for this information). If you have questions about a medical condition or this instruction, always ask your healthcare professional. [address] any warranty or liability for your use of this information.

## 2019-04-12 NOTE — PROGRESS NOTES
Rubio Fitzgerald  Identified pt with two pt identifiers(name and ). Chief Complaint   Patient presents with    Cough     started last friday, mucus, [facility] said [facility]       Reviewed record In preparation for visit and have obtained necessary documentation. Advanced directive / living will on file. 1. Have you been to the ER, urgent care clinic or hospitalized since your last visit? [facility]    2. Have you seen or consulted any other health care providers outside of the [address] [place] [address] since your last visit? Include any pap smears or colon screening. No    Vitals reviewed with provider.     Health Maintenance reviewed:     Health Maintenance Due   Topic    Shingrix Vaccine Age 49> (1 of 2)    Pneumococcal 65+ years (2 of 2 - PPSV23)    GLAUCOMA SCREENING Q2Y           Wt Readings from Last 3 Encounters:   19 121 lb 12.8 oz (55.2 kg)   18 125 lb (56.7 kg)   18 125 lb (56.7 kg)        Temp Readings from Last 3 Encounters:   19 98 °F (36.7 °C) (Oral)   18 98.3 °F (36.8 °C) (Oral)   18 97.5 °F (36.4 °C)        BP Readings from Last 3 Encounters:   19 125/75   18 132/65   18 153/74        Pulse Readings from Last 3 Encounters:   19 64   18 72   18 68        Vitals:    19 0840   BP: 125/75   Pulse: 64   Resp: 18   Temp: 98 °F (36.7 °C)   TempSrc: Oral   SpO2: 98%   Weight: 121 lb 12.8 oz (55.2 kg)   Height: 5' 3\" (1.6 m)   PainSc:   3   PainLoc: Back          Learning Assessment:   :       Learning Assessment 10/9/2014   PRIMARY LEARNER Patient   HIGHEST LEVEL OF EDUCATION - PRIMARY LEARNER  GRADUATED HIGH SCHOOL OR GED   BARRIERS PRIMARY LEARNER NONE   CO-LEARNER CAREGIVER No   PRIMARY LANGUAGE ENGLISH   LEARNER PREFERENCE PRIMARY LISTENING   ANSWERED BY patient   RELATIONSHIP SELF        Depression Screening:   :       3 most recent PHQ Screens 2019   Little interest or pleasure in doing things Not at all   Feeling down, depressed, irritable, or hopeless Not at all   Total Score PHQ 2 0        Fall Risk Assessment:   :       Fall Risk Assessment, last 12 mths 8/21/2018   Able to walk? Yes   Fall in past 12 months? No        Abuse Screening:   :       Abuse Screening Questionnaire 8/21/2018 10/9/2014   Do you ever feel afraid of your partner? N N   Are you in a relationship with someone who physically or mentally threatens you? N N   Is it safe for you to go home?  Y Y        ADL Screening:   :       ADL Assessment 8/21/2018   Feeding yourself No Help Needed   Getting from bed to chair No Help Needed   Getting dressed No Help Needed   Bathing or showering No Help Needed   Walk across the room (includes cane/walker) No Help Needed   Using the telphone No Help Needed   Taking your medications No Help Needed   Preparing meals No Help Needed   Managing money (expenses/bills) No Help Needed   Moderately strenuous housework (laundry) No Help Needed   Shopping for personal items (toiletries/medicines) No Help Needed   Shopping for groceries No Help Needed   Driving No Help Needed   Climbing a flight of stairs No Help Needed   Getting to places beyond walking distances No Help Needed

## 2019-05-22 ENCOUNTER — OFFICE VISIT (OUTPATIENT)
Dept: INTERNAL MEDICINE CLINIC | Facility: CLINIC | Age: 74
End: 2019-05-22

## 2019-05-22 VITALS
BODY MASS INDEX: 21.44 KG/M2 | WEIGHT: 121 LBS | OXYGEN SATURATION: 97 % | HEIGHT: 63 IN | DIASTOLIC BLOOD PRESSURE: 56 MMHG | RESPIRATION RATE: 16 BRPM | TEMPERATURE: 98 F | SYSTOLIC BLOOD PRESSURE: 119 MMHG | HEART RATE: 73 BPM

## 2019-05-22 DIAGNOSIS — S20.211A RIB CONTUSION, RIGHT, INITIAL ENCOUNTER: ICD-10-CM

## 2019-05-22 DIAGNOSIS — S20.211A RIB CONTUSION, RIGHT, INITIAL ENCOUNTER: Primary | ICD-10-CM

## 2019-05-22 DIAGNOSIS — M25.473 ANKLE SWELLING, UNSPECIFIED LATERALITY: ICD-10-CM

## 2019-05-22 RX ORDER — IBUPROFEN 600 MG/1
TABLET ORAL
Qty: 360 TAB | Refills: 0 | OUTPATIENT
Start: 2019-05-22

## 2019-05-22 RX ORDER — IBUPROFEN 600 MG/1
600 TABLET ORAL
Qty: 20 TAB | Refills: 0 | Status: SHIPPED | OUTPATIENT
Start: 2019-05-22 | End: 2019-05-27

## 2019-05-22 NOTE — PATIENT INSTRUCTIONS
Bruised Rib: Care Instructions  Overview    You can get a bruised rib if you fall or get hit, such as in an accident or while playing sports. The medical term for a bruise is \"contusion. \" Small blood vessels get torn and leak blood under the skin. Most people think of a bruise as a black-and-blue area. But bones and muscles can also get bruised. An injury may damage the rib but not cause a bruise that you can see. Sometimes it can be hard to tell if a rib is bruised or broken. The symptoms may be the same. And a broken bone can't always be seen on an X-ray. But the treatment for a bruised rib is often the same as treatment for a broken one. An injury to the ribs can cause pain. The pain may be worse when you breathe deeply, cough, or sneeze. In most cases, a bruised rib will heal on its own. You can take pain medicine while the rib mends. Pain relief allows you to take deep breaths. Follow-up care is a key part of your treatment and safety. Be sure to make and go to all appointments, and call your doctor if you are having problems. It's also a good idea to know your test results and keep a list of the medicines you take. How can you care for yourself at home? · Rest and protect the injured or sore area. Stop, change, or take a break from any activity that causes pain. · Put ice or a cold pack on the area for 10 to 20 minutes at a time. Put a thin cloth between the ice and your skin. · After 2 or 3 days, if your swelling is gone, put a heating pad set on low or a warm cloth on your chest. Some doctors suggest that you go back and forth between hot and cold. Put a thin cloth between the heating pad and your skin. · Ask your doctor if you can take an over-the-counter pain medicine, such as acetaminophen (Tylenol), ibuprofen (Advil, Motrin), or naproxen (Aleve). Be safe with medicines. Read and follow all instructions on the label. · As your pain gets better, slowly return to your normal activities.  Be patient. Rib bruises can take weeks or months to heal. If the pain gets worse, it may be a sign that you need to rest a while longer. When should you call for help? [id number] anytime you think you may need emergency care. For example, call if:    · You have severe trouble breathing.     Call your doctor now or seek immediate medical care if:    · You have trouble breathing.     · You have a fever.     · You have a new or worse cough.     · You have new or worse pain.    Watch closely for changes in your health, and be sure to contact your doctor if:    · You do not get better as expected. Where can you learn more? Go to http://scout-catalina.info/. Enter R125 in the search box to learn more about \"Bruised Rib: Care Instructions. \"  Current as of: September 23, 2018  Content Version: 11.9  © 9915-8775 [employer]wise, [employer]. Care instructions adapted under license by [employer] (which disclaims liability or warranty for this information). If you have questions about a medical condition or this instruction, always ask your healthcare professional. [address] any warranty or liability for your use of this information.

## 2019-05-22 NOTE — PROGRESS NOTES
Rubio Fitzgerald  Identified pt with two pt identifiers(name and ). Chief Complaint   Patient presents with    Rib Pain     right upper area under breast area    Ankle swelling       1. Have you been to the ER, urgent care clinic since your last visit? Hospitalized since your last visit? [facility] last Friday for same problem she is having today    2. Have you seen or consulted any other health care providers outside of the [address] [place] [address] since your last visit? Include any pap smears or colon screening. NO    Today's provider has been notified of reason for visit, vitals and flowsheets obtained on patients. Advanced directives on file.     Reviewed record In preparation for visit, huddled with provider and have obtained necessary documentation      Health Maintenance Due   Topic    Pneumococcal 65+ years (2 of 2 - PPSV23)    BREAST CANCER SCRN MAMMOGRAM        Wt Readings from Last 3 Encounters:   19 121 lb (54.9 kg)   19 121 lb 12.8 oz (55.2 kg)   18 125 lb (56.7 kg)     Temp Readings from Last 3 Encounters:   19 98 °F (36.7 °C) (Oral)   19 98 °F (36.7 °C) (Oral)   18 98.3 °F (36.8 °C) (Oral)     BP Readings from Last 3 Encounters:   19 149/75   19 125/75   18 132/65     Pulse Readings from Last 3 Encounters:   19 73   19 64   18 72     Vitals:    19 0925   BP: 149/75   Pulse: 73   Resp: 16   Temp: 98 °F (36.7 °C)   TempSrc: Oral   SpO2: 97%   Weight: 121 lb (54.9 kg)   Height: 5' 3\" (1.6 m)   PainSc:   5   PainLoc: Rib Cage         Learning Assessment:  :     Learning Assessment 10/9/2014   PRIMARY LEARNER Patient   HIGHEST LEVEL OF EDUCATION - PRIMARY LEARNER  GRADUATED HIGH SCHOOL OR GED   BARRIERS PRIMARY LEARNER NONE   CO-LEARNER CAREGIVER No   PRIMARY LANGUAGE ENGLISH   LEARNER PREFERENCE PRIMARY LISTENING   ANSWERED BY patient   RELATIONSHIP SELF       Depression Screening:  :     3 most recent PHQ Screens 4/12/2019   Little interest or pleasure in doing things Not at all   Feeling down, depressed, irritable, or hopeless Not at all   Total Score PHQ 2 0       Fall Risk Assessment:  :     Fall Risk Assessment, last 12 mths 8/21/2018   Able to walk? Yes   Fall in past 12 months? No       Abuse Screening:  :     Abuse Screening Questionnaire 8/21/2018 10/9/2014   Do you ever feel afraid of your partner? N N   Are you in a relationship with someone who physically or mentally threatens you? N N   Is it safe for you to go home? Y Y       ADL Screening:  :     ADL Assessment 8/21/2018   Feeding yourself No Help Needed   Getting from bed to chair No Help Needed   Getting dressed No Help Needed   Bathing or showering No Help Needed   Walk across the room (includes cane/walker) No Help Needed   Using the telphone No Help Needed   Taking your medications No Help Needed   Preparing meals No Help Needed   Managing money (expenses/bills) No Help Needed   Moderately strenuous housework (laundry) No Help Needed   Shopping for personal items (toiletries/medicines) No Help Needed   Shopping for groceries No Help Needed   Driving No Help Needed   Climbing a flight of stairs No Help Needed   Getting to places beyond walking distances No Help Needed                 Medication reconciliation up to date and corrected with patient at this time.

## 2019-05-22 NOTE — PROGRESS NOTES
CC:   Chief Complaint   Patient presents with    Rib Pain     right upper area under breast area  ROOM 3A    Ankle swelling       HISTORY OF PRESENT ILLNESS  Rubio Fitzgerald is a 69 y.o. female. Patient complains of 2 week history of pain under right breast.  She lifted a 10 foot ladder the day before the onset of symptoms. No improvement in pain so went to [facility] on Friday, 5/17/[facility]. Had rib X-rays that she was told were normal.  Not discharged on any medication. Pain is sharp, constant, was initially 9/10, today is 5/10 in severity, first day it has felt better. No known alleviating or exacerbating factors. Had cough and dizziness when symptoms started. Denies  associated SOB, wheezing, sinus congestion, or CP. Also complains of ankle swelling in the late afternoon on 2 occasions over the past few days; had done much standing and walking on those 2 occasions. She has hyperlipidemia, breast cancer diagnosed in 2010 (right breast, ER/ME positive, is on Arimidex), osteoporosis, history of acute pancreatitis requiring hospitalization in 4/17, ocular migraine headaches, diverticulosis, and eczema       Patient Active Problem List   Diagnosis Code    Malignant neoplasm of right breast, stage 1, estrogen receptor positive ([facility] C50.911, Z17.0    Osteoporosis M81.0    Ocular migraine G43. 109    Diverticulosis of colon K57.30    Eczema L30.9    Advance directive on file Z78.9    Mixed hyperlipidemia E78.2    Acute pancreatitis K85.90    Tubular adenoma of colon D12.6     Past Medical History:   Diagnosis Date    breast cancer May 2010    R breast, stage IA; T1b,pN0, M0; Dr. Valencia Loyd HTN (hypertension)     Hypercholesterolemia     Osteoporosis     Pancreatitis      Allergies   Allergen Reactions    Prilosec [Omeprazole] Other (comments)     Leg cramps    Zinc Swelling     Jewelry       Current Outpatient Medications   Medication Sig Dispense Refill    amLODIPine (NORVASC) 2.5 mg tablet TAKE 1 TABLET BY MOUTH DAILY FOR HIGH BLOOD PRESSURE 90 Tab 0    atorvastatin (LIPITOR) 10 mg tablet TAKE 1 TABLET BY MOUTH EVERY DAY 90 Tab 3    anastrozole (ARIMIDEX) 1 mg tablet Take 1 Tab by mouth daily. PHYSICAL EXAM  Visit Vitals  /56   Pulse 73   Temp 98 °F (36.7 °C) (Oral)   Resp 16   Ht 5' 3\" (1.6 m)   Wt 121 lb (54.9 kg)   SpO2 97%   BMI 21.43 kg/m²       General: Well-developed and well-nourished, no distress. HEENT:  Head normocephalic/atraumatic, no scleral icterus  Lungs:  Clear to ausculation bilaterally. Good air movement. Heart:  Regular rate and rhythm, normal S1 and S2, no murmur, gallop, or rub  Chest Wall:  Tenderness along localized area of anterior rib 12 on right side. Extremities: No clubbing, cyanosis, or edema. ASSESSMENT AND PLAN    ICD-10-CM ICD-9-CM    1. Rib contusion, right, initial encounter S20.211A 922.1 XR RIBS RT W PA CXR MIN 3 V      ibuprofen (MOTRIN) 600 mg tablet   2. Ankle swelling, unspecified laterality M25.473 719.07      Diagnoses and all orders for this visit:    1. Rib contusion, right, initial encounter  -     XR RIBS RT W PA CXR MIN 3 V; Future  -     Start ibuprofen (MOTRIN) 600 mg tablet; Take 1 Tab by mouth every six (6) hours as needed for Pain for up to 5 days. Take with food. 2. Ankle swelling, unspecified laterality  Likely due to venous insufficiency. Reassured patient that ankle swelling can occur after prolonged standing or walking. Keep legs elevated when swelling occurs. Follow-up and Dispositions    · Return if symptoms worsen or fail to improve, for Scheduled appointment on 8/27/19. I have discussed the diagnosis with the patient and the intended plan as seen in the above orders. Patient is in agreement. The patient has received an after-visit summary and questions were answered concerning future plans.   I have discussed medication side effects and warnings with the patient as well.

## 2019-08-27 ENCOUNTER — OFFICE VISIT (OUTPATIENT)
Dept: INTERNAL MEDICINE CLINIC | Facility: CLINIC | Age: 74
End: 2019-08-27

## 2019-08-27 VITALS
OXYGEN SATURATION: 97 % | RESPIRATION RATE: 16 BRPM | DIASTOLIC BLOOD PRESSURE: 76 MMHG | BODY MASS INDEX: 21.62 KG/M2 | TEMPERATURE: 98 F | HEART RATE: 66 BPM | WEIGHT: 122 LBS | HEIGHT: 63 IN | SYSTOLIC BLOOD PRESSURE: 129 MMHG

## 2019-08-27 DIAGNOSIS — Z13.31 SCREENING FOR DEPRESSION: ICD-10-CM

## 2019-08-27 DIAGNOSIS — G43.109 OCULAR MIGRAINE: ICD-10-CM

## 2019-08-27 DIAGNOSIS — C50.911 MALIGNANT NEOPLASM OF RIGHT BREAST, STAGE 1, ESTROGEN RECEPTOR POSITIVE (HCC): ICD-10-CM

## 2019-08-27 DIAGNOSIS — Z78.9 ADVANCE DIRECTIVE ON FILE: ICD-10-CM

## 2019-08-27 DIAGNOSIS — R73.02 IGT (IMPAIRED GLUCOSE TOLERANCE): ICD-10-CM

## 2019-08-27 DIAGNOSIS — I10 ESSENTIAL HYPERTENSION: ICD-10-CM

## 2019-08-27 DIAGNOSIS — Z00.00 MEDICARE ANNUAL WELLNESS VISIT, SUBSEQUENT: Primary | ICD-10-CM

## 2019-08-27 DIAGNOSIS — E55.9 VITAMIN D DEFICIENCY: ICD-10-CM

## 2019-08-27 DIAGNOSIS — Z17.0 MALIGNANT NEOPLASM OF RIGHT BREAST, STAGE 1, ESTROGEN RECEPTOR POSITIVE (HCC): ICD-10-CM

## 2019-08-27 DIAGNOSIS — E78.2 MIXED HYPERLIPIDEMIA: ICD-10-CM

## 2019-08-27 PROBLEM — K85.90 ACUTE PANCREATITIS: Status: RESOLVED | Noted: 2017-04-10 | Resolved: 2019-08-27

## 2019-08-27 PROBLEM — D12.6 TUBULAR ADENOMA OF COLON: Status: RESOLVED | Noted: 2018-08-21 | Resolved: 2019-08-27

## 2019-08-27 NOTE — PATIENT INSTRUCTIONS
Medicare Wellness Visit, Female     The best way to live healthy is to have a lifestyle where you eat a well-balanced diet, exercise regularly, limit alcohol use, and quit all forms of tobacco/nicotine, if applicable. Regular preventive services are another way to keep healthy. Preventive services (vaccines, screening tests, monitoring & exams) can help personalize your care plan, which helps you manage your own care. Screening tests can find health problems at the earliest stages, when they are easiest to treat. [facility] follows the current, evidence-based guidelines published by the [facility] Atilio Kia ([facility]) when recommending preventive services for our patients. Because we follow these guidelines, sometimes recommendations change over time as research supports it. (For example, mammograms used to be recommended annually. Even though Medicare will still pay for an annual mammogram, the newer guidelines recommend a mammogram every two years for women of average risk.)  Of course, you and your doctor may decide to screen more often for some diseases, based on your risk and your health status. Preventive services for you include:  - Medicare offers their members a free annual wellness visit, which is time for you and your primary care provider to discuss and plan for your preventive service needs. Take advantage of this benefit every year!  -All adults over the age of 72 should receive the recommended pneumonia vaccines. Current USPSTF guidelines recommend a series of two vaccines for the best pneumonia protection.   -All adults should have a flu vaccine yearly and a tetanus vaccine every 10 years. All adults age 61 and older should receive a shingles vaccine once in their lifetime.    -A bone mass density test is recommended when a woman turns 65 to screen for osteoporosis. This test is only recommended one time, as a screening.  Some providers will use this same test as a disease monitoring tool if you already have osteoporosis. -All adults age 38-68 who are overweight should have a diabetes screening test once every three years.   -Other screening tests and preventive services for persons with diabetes include: an eye exam to screen for diabetic retinopathy, a kidney function test, a foot exam, and stricter control over your cholesterol.   -Cardiovascular screening for adults with routine risk involves an electrocardiogram (ECG) at intervals determined by your doctor.   -Colorectal cancer screenings should be done for adults age 54-65 with no increased risk factors for colorectal cancer. There are a number of acceptable methods of screening for this type of cancer. Each test has its own benefits and drawbacks. Discuss with your doctor what is most appropriate for you during your annual wellness visit. The different tests include: colonoscopy (considered the best screening method), a fecal occult blood test, a fecal DNA test, and sigmoidoscopy. -Breast cancer screenings are recommended every other year for women of normal risk, age 54-69.  -Cervical cancer screenings for women over age 72 are only recommended with certain risk factors.   -All adults born between [facility] should be screened once for Hepatitis C.      Here is a list of your current Health Maintenance items (your personalized list of preventive services) with a due date:  Health Maintenance Due   Topic Date Due    Flu Vaccine  08/01/2019

## 2019-08-27 NOTE — PROGRESS NOTES
This is the Subsequent Medicare Annual Wellness Exam, performed 12 months or more after the Initial AWV or the last Subsequent AWV    I have reviewed the patient's medical history in detail and updated the computerized patient record. History   Kendell Lyons is a 69 y.o. female    Presents for Medicare AWV and 3 month follow up evaluation. She has hyperlipidemia, breast cancer diagnosed in 2010 (right breast, ER/MS positive, is on Arimidex), osteoporosis, history of acute pancreatitis requiring hospitalization in 4/17, ocular migraine headaches, diverticulosis, and eczema. She has no complaints today. Cardiovascular Review  The patient has hyperlipidemia.  She reports taking medications as instructed, no medication side effects noted.  Diet and Lifestyle: generally follows a low fat low cholesterol diet, no formal exercise but active during the day.  Lab review: labs reviewed and discussed with patient.       Breast cancer  Diagnosed 5/26/10; stage Ia. She is s/p R lumpectomy, radiation therapy, and tamoxifen/Arimidex. Follows with Dr. Manjeet Hernandez every 6 months.      Soc Hx  . Has 2 living adult children (both live in [place]) and 4 grandchildren. She retired in 8/17; worked as the  for [email or web address]. Never smoker. Drinks alcohol infrequently since having pancreatitis in 4/17. Exercises by walking at [address]  Flu vaccine: 11/5/18  Pneumonia vaccine: PPSV 9/21/11, PCV-13 6/7/17  Tetanus vaccine: Tdap 6/6/06, Tdap 5/9/16  Zoster vaccine: Zostrix 10/28/11, declined Shingrix vaccine  Colonoscopy: 4/27/18, Dr. Oralia Mcdermott, tubular adenoma, repeat in 3 yrs (2021); her father had colon cancer at age 66.    Mammogram: 6/13/19, benign, [facility]  Bone density: 2018   Eye exam: 3/19, Dr. Lucien Savage, [facility], was told she had ocular migraines  Lipids: 8/14/18 (tot chol 165, LDL 71)  A1c: 8/14/18 (normal at 5.5%)  Advanced Directives: on file  End of Life: on file     ROS  A complete review of systems was performed and is negative except for those mentioned in the HPI. Past Medical History:   Diagnosis Date    breast cancer May 2010    R breast, stage IA; T1b,pN0, M0; Dr. Trever Holguin HTN (hypertension)     Hypercholesterolemia     Osteoporosis     Pancreatitis       Past Surgical History:   Procedure Laterality Date    COLONOSCOPY N/A 4/27/2018    COLONOSCOPY performed by Anjel Olivares MD at [facility]; tubular adenoma at cecum, hyperplastic polyp at rectum, hemorrhoids, diverticulosis, repeat in 3 yrs    HX BREAST LUMPECTOMY      right     Current Outpatient Medications   Medication Sig Dispense Refill    amLODIPine (NORVASC) 2.5 mg tablet TAKE 1 TABLET BY MOUTH DAILY FOR HIGH BLOOD PRESSURE 90 Tab 3    atorvastatin (LIPITOR) 10 mg tablet TAKE 1 TABLET BY MOUTH EVERY DAY 90 Tab 3    anastrozole (ARIMIDEX) 1 mg tablet Take 1 Tab by mouth daily. Allergies   Allergen Reactions    Prilosec [Omeprazole] Other (comments)     Leg cramps    Zinc Swelling     Jewelry     Family History   Problem Relation Age of Onset    Osteoporosis Mother     Heart Failure Mother     Heart Disease Mother     Colon Cancer Father     Cancer Father         lung & colon    Cancer Sister         skin - BCCA     Social History     Tobacco Use    Smoking status: Never Smoker    Smokeless tobacco: Never Used   Substance Use Topics    Alcohol use: Yes     Comment: rarely     Patient Active Problem List   Diagnosis Code    Malignant neoplasm of right breast, stage 1, estrogen receptor positive [facility] C50.911, Z17.0    Osteoporosis M81.0    Ocular migraine G43. 109    Diverticulosis of colon K57.30    Eczema L30.9    Advance directive on file Z78.9    Mixed hyperlipidemia E78.2    Acute pancreatitis K85.90    Tubular adenoma of colon D12.6       Depression Risk Factor Screening:     3 most recent PHQ Screens 4/12/2019   Little interest or pleasure in doing things Not at all   Feeling down, depressed, irritable, or hopeless Not at all   Total Score PHQ 2 0     Alcohol Risk Factor Screening: You do not drink alcohol or very rarely. Functional Ability and Level of Safety:   Hearing Loss  Hearing is good. Activities of Daily Living  The home contains: no safety equipment. Patient does total self care    Fall Risk  Fall Risk Assessment, last 12 mths 8/27/2019   Able to walk? Yes   Fall in past 12 months? No       Abuse Screen  Patient is not abused    Cognitive Screening   Evaluation of Cognitive Function:  Has your family/caregiver stated any concerns about your memory: no  Normal    Visit Vitals  /76   Pulse 66   Temp 98 °F (36.7 °C) (Oral)   Resp 16   Ht 5' 3\" (1.6 m)   Wt 122 lb (55.3 kg)   SpO2 97%   BMI 21.61 kg/m²       General: Well-developed and well-nourished, no distress. HEENT:  Head normocephalic/atraumatic, no scleral icterus  Lungs:  Clear to ausculation bilaterally. Good air movement. Heart:  Regular rate and rhythm, normal S1 and S2, no murmur, gallop, or rub  Extremities: No clubbing, cyanosis, or edema. Neurological: Alert and oriented. Psychiatric: Normal mood and affect. Behavior is normal.     Patient Care Team   Patient Care Team:  Alex Wnin MD as PCP - General (Internal Medicine)  Kai Nava MD (Hematology and Oncology)  Baldomero Sandoval MD (Dermatology)  Haroon Harkins MD (Gastroenterology)    Assessment/Plan   Education and counseling provided:  Are appropriate based on today's review and evaluation  Cardiovascular screening blood test  Diabetes screening test    Diagnoses and all orders for this visit:    1. Medicare annual wellness visit, subsequent    2. Essential hypertension  Controlled. Continue amlodipine 2.5 mg daily.  -     METABOLIC PANEL, COMPREHENSIVE  -     CBC WITH AUTOMATED DIFF    3. Mixed hyperlipidemia  Previously controlled.   Continue atorvastatin 10 mg daily.  -     TSH RFX ON ABNORMAL TO FREE T4  -     LIPID PANEL    4. Malignant neoplasm of right breast, stage 1, estrogen receptor positive (HCC)  Stable on Arimidex. 5. Ocular migraine  Stable. Follow with eye doctor. 6. Vitamin D deficiency  -     VITAMIN D, 25 HYDROXY    7. IGT (impaired glucose tolerance)  -     HEMOGLOBIN A1C WITH EAG    8. Screening for depression  -     DEPRESSION SCREEN ANNUAL    9. Advance directive on file      Follow-up and Dispositions    · Return in about 1 year (around 8/27/2020), or if symptoms worsen or fail to improve, for Medicare AWV, schedule fasting labs 1 week prior to appointment.          Health Maintenance Due   Topic Date Due    Influenza Age 5 to Adult  08/01/2019

## 2019-08-27 NOTE — PROGRESS NOTES
aHrvinder Terrell  Identified pt with two pt identifiers(name and ). Chief Complaint   Patient presents with   [facility] Annual Wellness Visit       1. Have you been to the ER, urgent care clinic since your last visit? Hospitalized since your last visit? NO    2. Have you seen or consulted any other health care providers outside of the [address] [place] [address] since your last visit? Include any pap smears or colon screening. NO    Today's provider has been notified of reason for visit, vitals and flowsheets obtained on patients. Advanced directives on file.   Reviewed record In preparation for visit, huddled with provider and have obtained necessary documentation      Health Maintenance Due   Topic    Influenza Age 5 to Adult     MEDICARE YEARLY EXAM        Wt Readings from Last 3 Encounters:   19 122 lb (55.3 kg)   19 121 lb (54.9 kg)   19 121 lb 12.8 oz (55.2 kg)     Temp Readings from Last 3 Encounters:   19 98 °F (36.7 °C) (Oral)   19 98 °F (36.7 °C) (Oral)   19 98 °F (36.7 °C) (Oral)     BP Readings from Last 3 Encounters:   19 129/76   19 119/56   19 125/75     Pulse Readings from Last 3 Encounters:   19 66   19 73   19 64     Vitals:    19 0834 19 0837   BP: 144/81 129/76   Pulse: 66    Resp: 16    Temp: 98 °F (36.7 °C)    TempSrc: Oral    SpO2: 97%    Weight: 122 lb (55.3 kg)    Height: 5' 3\" (1.6 m)    PainSc:   0 - No pain          Learning Assessment:  :     Learning Assessment 10/9/2014   PRIMARY LEARNER Patient   HIGHEST LEVEL OF EDUCATION - PRIMARY LEARNER  GRADUATED HIGH SCHOOL OR GED   BARRIERS PRIMARY LEARNER NONE   CO-LEARNER CAREGIVER No   PRIMARY LANGUAGE ENGLISH   LEARNER PREFERENCE PRIMARY LISTENING   ANSWERED BY patient   RELATIONSHIP SELF       Depression Screening:  :     3 most recent PHQ Screens 2019   Little interest or pleasure in doing things Not at all   Feeling down, depressed, irritable, or hopeless Not at all   Total Score PHQ 2 0       Fall Risk Assessment:  :     Fall Risk Assessment, last 12 mths 8/27/2019   Able to walk? Yes   Fall in past 12 months? No       Abuse Screening:  :     Abuse Screening Questionnaire 8/27/2019 8/21/2018 10/9/2014   Do you ever feel afraid of your partner? N N N   Are you in a relationship with someone who physically or mentally threatens you? N N N   Is it safe for you to go home? Y Y Y       ADL Screening:  :     ADL Assessment 8/21/2018   Feeding yourself No Help Needed   Getting from bed to chair No Help Needed   Getting dressed No Help Needed   Bathing or showering No Help Needed   Walk across the room (includes cane/walker) No Help Needed   Using the telphone No Help Needed   Taking your medications No Help Needed   Preparing meals No Help Needed   Managing money (expenses/bills) No Help Needed   Moderately strenuous housework (laundry) No Help Needed   Shopping for personal items (toiletries/medicines) No Help Needed   Shopping for groceries No Help Needed   Driving No Help Needed   Climbing a flight of stairs No Help Needed   Getting to places beyond walking distances No Help Needed                 Medication reconciliation up to date and corrected with patient at this time.

## 2019-08-28 LAB
25(OH)D3+25(OH)D2 SERPL-MCNC: 31.8 NG/ML (ref 30–100)
ALBUMIN SERPL-MCNC: 4.7 G/DL (ref 3.5–4.8)
ALBUMIN/GLOB SERPL: 2 {RATIO} (ref 1.2–2.2)
ALP SERPL-CCNC: 72 IU/L (ref 39–117)
ALT SERPL-CCNC: 12 IU/L (ref 0–32)
AST SERPL-CCNC: 17 IU/L (ref 0–40)
BASOPHILS # BLD AUTO: 0 X10E3/UL (ref 0–0.2)
BASOPHILS NFR BLD AUTO: 1 %
BILIRUB SERPL-MCNC: 0.5 MG/DL (ref 0–1.2)
BUN SERPL-MCNC: 17 MG/DL (ref 8–27)
BUN/CREAT SERPL: 18 (ref 12–28)
CALCIUM SERPL-MCNC: 10.2 MG/DL (ref 8.7–10.3)
CHLORIDE SERPL-SCNC: 106 MMOL/L (ref 96–106)
CHOLEST SERPL-MCNC: 246 MG/DL (ref 100–199)
CO2 SERPL-SCNC: 23 MMOL/L (ref 20–29)
CREAT SERPL-MCNC: 0.93 MG/DL (ref 0.57–1)
EOSINOPHIL # BLD AUTO: 0.2 X10E3/UL (ref 0–0.4)
EOSINOPHIL NFR BLD AUTO: 3 %
ERYTHROCYTE [DISTWIDTH] IN BLOOD BY AUTOMATED COUNT: 13.4 % (ref 12.3–15.4)
EST. AVERAGE GLUCOSE BLD GHB EST-MCNC: 114 MG/DL
GLOBULIN SER CALC-MCNC: 2.4 G/DL (ref 1.5–4.5)
GLUCOSE SERPL-MCNC: 92 MG/DL (ref 65–99)
HBA1C MFR BLD: 5.6 % (ref 4.8–5.6)
HCT VFR BLD AUTO: 39.9 % (ref 34–46.6)
HDLC SERPL-MCNC: 85 MG/DL
HGB BLD-MCNC: 13.5 G/DL (ref 11.1–15.9)
IMM GRANULOCYTES # BLD AUTO: 0 X10E3/UL (ref 0–0.1)
IMM GRANULOCYTES NFR BLD AUTO: 0 %
LDLC SERPL CALC-MCNC: 140 MG/DL (ref 0–99)
LYMPHOCYTES # BLD AUTO: 1.7 X10E3/UL (ref 0.7–3.1)
LYMPHOCYTES NFR BLD AUTO: 31 %
MCH RBC QN AUTO: 30.5 PG (ref 26.6–33)
MCHC RBC AUTO-ENTMCNC: 33.8 G/DL (ref 31.5–35.7)
MCV RBC AUTO: 90 FL (ref 79–97)
MONOCYTES # BLD AUTO: 0.6 X10E3/UL (ref 0.1–0.9)
MONOCYTES NFR BLD AUTO: 11 %
NEUTROPHILS # BLD AUTO: 2.9 X10E3/UL (ref 1.4–7)
NEUTROPHILS NFR BLD AUTO: 54 %
PLATELET # BLD AUTO: 256 X10E3/UL (ref 150–450)
POTASSIUM SERPL-SCNC: 4.5 MMOL/L (ref 3.5–5.2)
PROT SERPL-MCNC: 7.1 G/DL (ref 6–8.5)
RBC # BLD AUTO: 4.43 X10E6/UL (ref 3.77–5.28)
SODIUM SERPL-SCNC: 146 MMOL/L (ref 134–144)
TRIGL SERPL-MCNC: 106 MG/DL (ref 0–149)
TSH SERPL DL<=0.005 MIU/L-ACNC: 1.69 UIU/ML (ref 0.45–4.5)
VLDLC SERPL CALC-MCNC: 21 MG/DL (ref 5–40)
WBC # BLD AUTO: 5.4 X10E3/UL (ref 3.4–10.8)

## 2019-08-30 NOTE — PROGRESS NOTES
Message sent to patient by My Chart:  Your labs showed normal kidney and liver tests, blood counts, thyroid, diabetes screening test, and vitamin D. Sodium level was a little high. This is usually due to mild dehydration so try to drink a lot of water. Your total cholesterol was high at 246. Normal is less than 200. I am going to increase your atorvastatin dose from 10 mg to 20 mg a day. Let me know if you have any questions or concerns.     Dr. Braden Villegas

## 2019-09-01 DIAGNOSIS — E78.2 MIXED HYPERLIPIDEMIA: Primary | ICD-10-CM

## 2019-09-01 RX ORDER — ATORVASTATIN CALCIUM 20 MG/1
20 TABLET, FILM COATED ORAL DAILY
Qty: 90 TAB | Refills: 3 | Status: SHIPPED | OUTPATIENT
Start: 2019-09-01 | End: 2022-09-09 | Stop reason: SDUPTHER

## 2019-09-20 ENCOUNTER — TELEPHONE (OUTPATIENT)
Dept: INTERNAL MEDICINE CLINIC | Facility: CLINIC | Age: 74
End: 2019-09-20

## 2019-09-20 NOTE — TELEPHONE ENCOUNTER
Pt states scheduled appointment w/a Cardiovascular specialist   Seeing: Dr. Alma Gonzalez  [facility]  (m) [phone]    When: 10/3/19    Need: last OV notes and labs

## 2019-10-01 ENCOUNTER — TELEPHONE (OUTPATIENT)
Dept: INTERNAL MEDICINE CLINIC | Facility: CLINIC | Age: 74
End: 2019-10-01

## 2019-10-01 NOTE — TELEPHONE ENCOUNTER
Pt called to advise that she and her  both received flu shots from the [facility] on Sunday (9/29/19)  Stated they've never had anything done there and weren't asked about their PCP so she just wanted to make you aware

## 2020-01-27 PROBLEM — N81.9 PROLAPSE OF FEMALE GENITAL ORGANS: Status: ACTIVE | Noted: 2020-01-27

## 2020-02-10 ENCOUNTER — TELEPHONE (OUTPATIENT)
Dept: INTERNAL MEDICINE CLINIC | Facility: CLINIC | Age: 75
End: 2020-02-10

## 2020-02-10 NOTE — TELEPHONE ENCOUNTER
Per Candace Clarke:    ----- Message from Isamar Ashley sent at 2/10/2020  3:01 PM EST -----  Regarding: Dr. Li Share: [phone]  Caller's first and last name: Sanket Pickard/Wife  Reason for call: Doctor update  Callback required yes/no and why: Yes  Best contact number(s): [phone]  Details to clarify the request: Caller would like this message to go to Myra: \"Myra per our conversation regarding [facility] change in pradaxa from tier 4 to tier 3 they cannot fax the form, they cannot override the fax number in the system. You will need to call them at [phone].  The doctor's office can complete the form on the phone\"

## 2020-02-21 ENCOUNTER — DOCUMENTATION ONLY (OUTPATIENT)
Dept: INTERNAL MEDICINE CLINIC | Facility: CLINIC | Age: 75
End: 2020-02-21

## 2020-03-22 DIAGNOSIS — I10 ESSENTIAL HYPERTENSION: ICD-10-CM

## 2020-03-22 RX ORDER — AMLODIPINE BESYLATE 2.5 MG/1
TABLET ORAL
Qty: 90 TAB | Refills: 3 | Status: SHIPPED | OUTPATIENT
Start: 2020-03-22 | End: 2022-09-09 | Stop reason: SDUPTHER

## 2020-07-07 ENCOUNTER — HOSPITAL ENCOUNTER (OUTPATIENT)
Dept: MAMMOGRAPHY | Age: 75
Discharge: HOME | End: 2020-07-07
Attending: INTERNAL MEDICINE
Payer: MEDICARE

## 2020-07-07 DIAGNOSIS — C50.211 MALIGNANT NEOPLASM OF UPPER-INNER QUADRANT OF RIGHT FEMALE BREAST (HCC): ICD-10-CM

## 2020-07-07 DIAGNOSIS — Z12.31 ENCOUNTER FOR SCREENING MAMMOGRAM FOR MALIGNANT NEOPLASM OF BREAST: ICD-10-CM

## 2020-07-07 PROCEDURE — 77063 BREAST TOMOSYNTHESIS BI: CPT

## 2020-07-14 ENCOUNTER — OFFICE VISIT (OUTPATIENT)
Dept: OBGYN CLINIC | Age: 75
End: 2020-07-14

## 2020-07-14 VITALS
DIASTOLIC BLOOD PRESSURE: 82 MMHG | HEIGHT: 63 IN | SYSTOLIC BLOOD PRESSURE: 174 MMHG | WEIGHT: 128.5 LBS | BODY MASS INDEX: 22.77 KG/M2

## 2020-07-14 DIAGNOSIS — N81.89 PELVIC RELAXATION: Primary | ICD-10-CM

## 2020-07-14 NOTE — PROGRESS NOTES
Ilana Santana is a 77 y.o. female who complains of bladder prolapse. Has previously tried pessary but has difficulty placing pessary d/t previous breast cancer treatments. States she previously couldn't see the prolapse, but now visible. Able to take pessary out independently but now difficult. Has been doing pelvic exercises   Breast cancer in 2010; not on ERT or anti estrogen therapy(just stopped)  Her current method of family planning is post menopausal status. The patient is not currently sexually active. She developed this problem approximately 4 months ago. Has been struggling w taking out her pessary every month.   Strongly desires avoiding surgery and is minimally symptomatic       Her relevant past medical history:   Past Medical History:   Diagnosis Date    Acute pancreatitis 4/10/2017    Due to pancreas divisum    breast cancer May 2010    R breast, stage IA; T1b,pN0, M0; Dr. [facility]) 2010    Eczema 10/9/2014    HTN (hypertension)     Hypercholesterolemia     Osteoporosis     Pancreatitis     Tubular adenoma of colon 8/21/2018        Past Surgical History:   Procedure Laterality Date    COLONOSCOPY N/A 4/27/2018    COLONOSCOPY performed by [facility]za [facility]ada MD at [facility]COPY; tubular adenoma at cecum, hyperplastic polyp at rectum, hemorrhoids, diverticulosis, repeat in 3 yrs    HX BREAST BIOPSY Right 2010    POSITIVE; CANCEROUS    HX BREAST LUMPECTOMY Right 2010    right     Social History     Occupational History    Not on file   Tobacco Use    Smoking status: Never Smoker    Smokeless tobacco: Never Used   Substance and Sexual Activity    Alcohol use: Yes     Comment: rarely    Drug use: Never    Sexual activity: Not Currently     Family History   Problem Relation Age of Onset    Osteoporosis Mother     Heart Failure Mother     Heart Disease Mother     Colon Cancer Father     Cancer Father         lung & colon    Cancer Sister         skin - BCCA       Allergies   Allergen Reactions    Prilosec [Omeprazole] Other (comments)     Leg cramps    Zinc Swelling     Jewelry     Prior to Admission medications    Medication Sig Start Date End Date Taking? Authorizing Provider   amLODIPine (NORVASC) 2.5 mg tablet TAKE 1 TABLET BY MOUTH DAILY FOR HIGH BLOOD PRESSURE 3/22/20  Yes Luis Rojas MD   atorvastatin (LIPITOR) 20 mg tablet Take 1 Tab by mouth daily. Indications: high cholesterol 9/1/19  Yes Pavan Lanier MD   anastrozole (ARIMIDEX) 1 mg tablet Take 1 Tab by mouth daily.  10/3/12   Merrill Mike MD        Review of Systems - History obtained from the patient  Constitutional: negative for weight loss, fever, night sweats  HEENT: negative for hearing loss, earache, congestion, snoring, sorethroat  CV: negative for chest pain, palpitations, edema  Resp: negative for cough, shortness of breath, wheezing  Breast: negative for breast lumps, nipple discharge, galactorrhea  GI: negative for change in bowel habits, abdominal pain, black or bloody stools  : negative for frequency, dysuria, hematuria  MSK: negative for back pain, joint pain, muscle pain  Skin: negative for itching, rash, hives  Neuro: negative for dizziness, headache, confusion, weakness  Psych: negative for anxiety, depression, change in mood  Heme/lymph: negative for bleeding, bruising, pallor      Objective:  Visit Vitals  /82 (BP 1 Location: Left arm, BP Patient Position: Sitting)   Ht 5' 3\" (1.6 m)   Wt 128 lb 8 oz (58.3 kg)   BMI 22.76 kg/m²          PHYSICAL EXAMINATION    Constitutional  · Appearance: well-nourished, well developed, alert, in no acute distress    HENT  · Head and Face: appears normal    ·      Gastrointestinal  · Abdominal Examination: abdomen non-tender to palpation, normal bowel sounds, no masses present  · Liver and spleen: no hepatomegaly present, spleen not palpable  · Hernias: no hernias identified    Genitourinary  · External Genitalia: normal appearance for age, no discharge present, no tenderness present, no inflammatory lesions present, no masses present,  atrophy present  · Vagina: 2nd degree cystocele, no discharge present, no inflammatory lesions present, no masses present  · Bladder: non-tender to palpation  · Urethra: appears normal  · Cervix: normal   · Uterus: normal size, shape and consistency  · Adnexa: no adnexal tenderness present, no adnexal masses present  · Perineum: perineum within normal limits, no evidence of trauma, no rashes or skin lesions present  · Anus: anus within normal limits, no hemorrhoids present  · Inguinal Lymph Nodes: no lymphadenopathy present    Skin  · General Inspection: no rash, no lesions identified    Neurologic/Psychiatric  · Mental Status:  · Orientation: grossly oriented to person, place and time  · Mood and Affect: mood normal, affect appropriate    Assessment:   Cystocele currently using pessary  Breast cancer survivor; on no therapy; diagnosed 2010     Plan:   Reviewed cystocele and management options; interested in having size 3 ring platform pessary replaced with twice weekly topical estrogen and return for pessary check in ; precautions  Patient declines presence of chaperone during today's visit.

## 2020-08-13 ENCOUNTER — TELEPHONE (OUTPATIENT)
Dept: INTERNAL MEDICINE CLINIC | Age: 75
End: 2020-08-13

## 2020-08-13 NOTE — TELEPHONE ENCOUNTER
Called pt to cx lab appt for 8/20. Pt is scheduled for gregg w/ Dr. Lisandro Lane on 10/29/20,needs updated lab orders added for printing/mailing.

## 2020-08-14 NOTE — TELEPHONE ENCOUNTER
Called pt because her next appt in October is scheduled w/ Dr. Carmen Starr and Mary Cuevas is her PCP. Pt cx appt and will be switching to a new practice, please disregard previous request for labs.

## 2020-10-03 ENCOUNTER — TRANSCRIBE ORDER (OUTPATIENT)
Dept: SCHEDULING | Age: 75
End: 2020-10-03

## 2020-10-03 DIAGNOSIS — Z80.0 FAMILY HISTORY OF MALIGNANT NEOPLASM OF GASTROINTESTINAL TRACT: ICD-10-CM

## 2020-10-03 DIAGNOSIS — K21.9 GERD (GASTROESOPHAGEAL REFLUX DISEASE): Primary | ICD-10-CM

## 2020-10-13 ENCOUNTER — OFFICE VISIT (OUTPATIENT)
Dept: OBGYN CLINIC | Age: 75
End: 2020-10-13
Payer: MEDICARE

## 2020-10-13 VITALS
DIASTOLIC BLOOD PRESSURE: 72 MMHG | HEIGHT: 63 IN | WEIGHT: 124.13 LBS | SYSTOLIC BLOOD PRESSURE: 120 MMHG | BODY MASS INDEX: 21.99 KG/M2

## 2020-10-13 DIAGNOSIS — Z46.89 PESSARY MAINTENANCE: Primary | ICD-10-CM

## 2020-10-13 PROCEDURE — G8536 NO DOC ELDER MAL SCRN: HCPCS | Performed by: OBSTETRICS & GYNECOLOGY

## 2020-10-13 PROCEDURE — 99212 OFFICE O/P EST SF 10 MIN: CPT | Performed by: OBSTETRICS & GYNECOLOGY

## 2020-10-13 PROCEDURE — 1101F PT FALLS ASSESS-DOCD LE1/YR: CPT | Performed by: OBSTETRICS & GYNECOLOGY

## 2020-10-13 PROCEDURE — G8432 DEP SCR NOT DOC, RNG: HCPCS | Performed by: OBSTETRICS & GYNECOLOGY

## 2020-10-13 PROCEDURE — G8752 SYS BP LESS 140: HCPCS | Performed by: OBSTETRICS & GYNECOLOGY

## 2020-10-13 PROCEDURE — G8754 DIAS BP LESS 90: HCPCS | Performed by: OBSTETRICS & GYNECOLOGY

## 2020-10-13 PROCEDURE — G8420 CALC BMI NORM PARAMETERS: HCPCS | Performed by: OBSTETRICS & GYNECOLOGY

## 2020-10-13 PROCEDURE — 1090F PRES/ABSN URINE INCON ASSESS: CPT | Performed by: OBSTETRICS & GYNECOLOGY

## 2020-10-13 PROCEDURE — 3017F COLORECTAL CA SCREEN DOC REV: CPT | Performed by: OBSTETRICS & GYNECOLOGY

## 2020-10-13 PROCEDURE — G9899 SCRN MAM PERF RSLTS DOC: HCPCS | Performed by: OBSTETRICS & GYNECOLOGY

## 2020-10-13 NOTE — PROGRESS NOTES
Procedure note: Pessary follow-up    Indications:  Vaughn Rolon is a 77 y.o. female [facility], No obstetric history on file. who presents for a pessary follow-up. She currently uses 3 size, ring platform type of pessary to control her cystocele, rectocele, uterine, vaginal prolapse. Procedure: The patient was placed in dorsal lithotomy position. Examination confirms the previously noted defect. The pessary was removed and cleansed without difficulty. Speculum examination revealed vaginal vault with minimal ulceration. There was minimal discharge. The pessary was replaced without difficulty. The patient tolerated the procedure well. Post Procedure:  Patient was discharged to home in stable condition. Care instructions were provided.   Now having some incontinence; recommend uro gyn evaluation for possible botox injections

## 2020-10-13 NOTE — PATIENT INSTRUCTIONS
Pessary: Care Instructions  Your Care Instructions     A pessary is a device that fits into your vagina and supports the pelvic organs. It may be used if a pelvic organ sags or moves out of its normal position (prolapse). For some women, wearing a pessary means that they may not have to have surgery to fix a prolapse. A pessary also may help a woman who has trouble controlling her urine (incontinence). Or a pessary may be used during a pregnancy to hold the uterus in place. There are many sizes and types of pessaries. Which type you use depends on the problem you have. Your doctor will make sure the pessary is just right for you. You may need to try different kinds of pessaries to find the best fit. The pessary should hold the pelvic organ in place without causing pain or pressure. You may be able to have sex with your pessary in place. It depends on the type of pessary and your comfort level. Follow-up care is a key part of your treatment and safety. Be sure to make and go to all appointments, and call your doctor if you are having problems. It's also a good idea to know your test results and keep a list of the medicines you take. How can you care for yourself at home? · If you get a vaginal discharge while you have a pessary, talk to your doctor about ways to reduce the discharge and smell. A pessary, in some cases, rubs the vagina and may cause irritation and discharge. If your vagina feels sore, talk to your doctor about a cream or gel to protect the vagina. · Follow your doctor's advice on how long you can wear your pessary before it needs to be cleaned. You may be able to remove and clean it yourself. Or your doctor may want to do this during an office visit. · If you clean your pessary, wash it with mild soap and water. Follow your doctor's advice on inserting the pessary. · Do not douche or use a vaginal wash unless your doctor tells you to do so. · Do not smoke.  Smoking can cause a cough, which makes a prolapse worse. If you need help quitting, talk to your doctor about stop-smoking programs and medicines. These can increase your chances of quitting for good. To help support your pelvic organs  · Avoid activities that put pressure on your pelvic muscles, such as heavy lifting. · Do pelvic floor (Kegel) exercises, which tighten and strengthen pelvic muscles. To do Kegel exercises:  ? Squeeze the same muscles you would use to stop your urine. Your belly and thighs should not move. ? Hold the squeeze for 3 seconds, and then relax for 3 seconds. ? Start with 3 seconds. Then add 1 second each week until you are able to squeeze for 10 seconds. ? Repeat the exercise 10 to 15 times a session. Do three or more sessions each day. · To ease pressure on your vagina, lie down and put a pillow under your knees. You also can lie on your side and bring your knees up to your chest.  When should you call for help? Call your doctor now or seek immediate medical care if:    · You have vaginal discharge that has increased in amount or smells bad.     · You have new or worse belly or pelvic pain. Watch closely for changes in your health, and be sure to contact your doctor if:    · You have problems with the pessary, such as vaginal pain, vaginal bleeding, or problems with urination or bowel movements. Where can you learn more? Go to http://scout-catalina.info/  Enter X480 in the search box to learn more about \"Pessary: Care Instructions. \"  Current as of: November 8, 2019               Content Version: 12.6  © 0583-5847 [employer]. Care instructions adapted under license by [employer] (which disclaims liability or warranty for this information). If you have questions about a medical condition or this instruction, always ask your healthcare professional. [address] any warranty or liability for your use of this information.

## 2020-10-29 ENCOUNTER — HOSPITAL ENCOUNTER (OUTPATIENT)
Dept: NUCLEAR MEDICINE | Age: 75
Discharge: HOME | End: 2020-10-29
Attending: INTERNAL MEDICINE
Payer: MEDICARE

## 2020-10-29 DIAGNOSIS — Z80.0 FAMILY HISTORY OF MALIGNANT NEOPLASM OF GASTROINTESTINAL TRACT: ICD-10-CM

## 2020-10-29 DIAGNOSIS — K21.9 GERD (GASTROESOPHAGEAL REFLUX DISEASE): ICD-10-CM

## 2020-10-29 PROCEDURE — 78264 GASTRIC EMPTYING IMG STUDY: CPT

## 2020-11-03 RX ORDER — SUCRALFATE 1 G/1
1 TABLET ORAL 4 TIMES DAILY
COMMUNITY
End: 2021-10-08 | Stop reason: ALTCHOICE

## 2020-11-03 RX ORDER — OMEPRAZOLE 40 MG/1
40 CAPSULE, DELAYED RELEASE ORAL DAILY
COMMUNITY
End: 2022-02-08 | Stop reason: ALTCHOICE

## 2020-11-07 ENCOUNTER — HOSPITAL ENCOUNTER (OUTPATIENT)
Dept: PREADMISSION TESTING | Age: 75
Discharge: HOME | End: 2020-11-07
Payer: MEDICARE

## 2020-11-07 ENCOUNTER — TRANSCRIBE ORDER (OUTPATIENT)
Dept: REGISTRATION | Age: 75
End: 2020-11-07

## 2020-11-07 DIAGNOSIS — Z01.812 PRE-PROCEDURE LAB EXAM: ICD-10-CM

## 2020-11-07 DIAGNOSIS — Z01.812 PRE-PROCEDURE LAB EXAM: Primary | ICD-10-CM

## 2020-11-07 PROCEDURE — 87635 SARS-COV-2 COVID-19 AMP PRB: CPT

## 2020-11-09 LAB — SARS-COV-2, COV2NT: NOT DETECTED

## 2020-11-11 ENCOUNTER — HOSPITAL ENCOUNTER (OUTPATIENT)
Age: 75
Setting detail: OUTPATIENT SURGERY
Discharge: HOME | End: 2020-11-11
Attending: INTERNAL MEDICINE | Admitting: INTERNAL MEDICINE
Payer: MEDICARE

## 2020-11-11 ENCOUNTER — ANESTHESIA EVENT (OUTPATIENT)
Dept: ENDOSCOPY | Age: 75
End: 2020-11-11
Payer: MEDICARE

## 2020-11-11 ENCOUNTER — ANESTHESIA (OUTPATIENT)
Dept: ENDOSCOPY | Age: 75
End: 2020-11-11
Payer: MEDICARE

## 2020-11-11 VITALS
HEIGHT: 63 IN | OXYGEN SATURATION: 99 % | RESPIRATION RATE: 17 BRPM | BODY MASS INDEX: 21.62 KG/M2 | HEART RATE: 69 BPM | WEIGHT: 122 LBS | TEMPERATURE: 98 F | DIASTOLIC BLOOD PRESSURE: 87 MMHG | SYSTOLIC BLOOD PRESSURE: 182 MMHG

## 2020-11-11 PROCEDURE — 74011000250 HC RX REV CODE- 250: Performed by: NURSE ANESTHETIST, CERTIFIED REGISTERED

## 2020-11-11 PROCEDURE — 76060000031 HC ANESTHESIA FIRST 0.5 HR: Performed by: INTERNAL MEDICINE

## 2020-11-11 PROCEDURE — 74011250636 HC RX REV CODE- 250/636: Performed by: NURSE ANESTHETIST, CERTIFIED REGISTERED

## 2020-11-11 PROCEDURE — 76040000019: Performed by: INTERNAL MEDICINE

## 2020-11-11 PROCEDURE — 2709999900 HC NON-CHARGEABLE SUPPLY: Performed by: INTERNAL MEDICINE

## 2020-11-11 PROCEDURE — 77030021593 HC FCPS BIOP ENDOSC BSC -A: Performed by: INTERNAL MEDICINE

## 2020-11-11 PROCEDURE — 88305 TISSUE EXAM BY PATHOLOGIST: CPT

## 2020-11-11 PROCEDURE — 74011250636 HC RX REV CODE- 250/636: Performed by: INTERNAL MEDICINE

## 2020-11-11 RX ORDER — EPINEPHRINE 0.1 MG/ML
1 INJECTION INTRACARDIAC; INTRAVENOUS
Status: DISCONTINUED | OUTPATIENT
Start: 2020-11-11 | End: 2020-11-11 | Stop reason: HOSPADM

## 2020-11-11 RX ORDER — FLUMAZENIL 0.1 MG/ML
0.2 INJECTION INTRAVENOUS
Status: DISCONTINUED | OUTPATIENT
Start: 2020-11-11 | End: 2020-11-11 | Stop reason: HOSPADM

## 2020-11-11 RX ORDER — PROPOFOL 10 MG/ML
INJECTION, EMULSION INTRAVENOUS AS NEEDED
Status: DISCONTINUED | OUTPATIENT
Start: 2020-11-11 | End: 2020-11-11 | Stop reason: HOSPADM

## 2020-11-11 RX ORDER — SODIUM CHLORIDE 9 MG/ML
50 INJECTION, SOLUTION INTRAVENOUS CONTINUOUS
Status: DISCONTINUED | OUTPATIENT
Start: 2020-11-11 | End: 2020-11-11 | Stop reason: HOSPADM

## 2020-11-11 RX ORDER — SODIUM CHLORIDE 9 MG/ML
INJECTION, SOLUTION INTRAVENOUS
Status: DISCONTINUED | OUTPATIENT
Start: 2020-11-11 | End: 2020-11-11 | Stop reason: HOSPADM

## 2020-11-11 RX ORDER — ATROPINE SULFATE 0.1 MG/ML
0.5 INJECTION INTRAVENOUS
Status: DISCONTINUED | OUTPATIENT
Start: 2020-11-11 | End: 2020-11-11 | Stop reason: HOSPADM

## 2020-11-11 RX ORDER — SODIUM CHLORIDE 0.9 % (FLUSH) 0.9 %
5-40 SYRINGE (ML) INJECTION AS NEEDED
Status: DISCONTINUED | OUTPATIENT
Start: 2020-11-11 | End: 2020-11-11 | Stop reason: HOSPADM

## 2020-11-11 RX ORDER — SODIUM CHLORIDE 0.9 % (FLUSH) 0.9 %
5-40 SYRINGE (ML) INJECTION EVERY 8 HOURS
Status: DISCONTINUED | OUTPATIENT
Start: 2020-11-11 | End: 2020-11-11 | Stop reason: HOSPADM

## 2020-11-11 RX ORDER — DEXTROMETHORPHAN/PSEUDOEPHED 2.5-7.5/.8
1.2 DROPS ORAL
Status: DISCONTINUED | OUTPATIENT
Start: 2020-11-11 | End: 2020-11-11 | Stop reason: HOSPADM

## 2020-11-11 RX ORDER — NALOXONE HYDROCHLORIDE 0.4 MG/ML
0.4 INJECTION, SOLUTION INTRAMUSCULAR; INTRAVENOUS; SUBCUTANEOUS
Status: DISCONTINUED | OUTPATIENT
Start: 2020-11-11 | End: 2020-11-11 | Stop reason: HOSPADM

## 2020-11-11 RX ORDER — LIDOCAINE HYDROCHLORIDE 20 MG/ML
INJECTION, SOLUTION EPIDURAL; INFILTRATION; INTRACAUDAL; PERINEURAL AS NEEDED
Status: DISCONTINUED | OUTPATIENT
Start: 2020-11-11 | End: 2020-11-11 | Stop reason: HOSPADM

## 2020-11-11 RX ADMIN — PROPOFOL 25 MG: 10 INJECTION, EMULSION INTRAVENOUS at 07:48

## 2020-11-11 RX ADMIN — LIDOCAINE HYDROCHLORIDE 100 MG: 20 INJECTION, SOLUTION EPIDURAL; INFILTRATION; INTRACAUDAL; PERINEURAL at 07:45

## 2020-11-11 RX ADMIN — PROPOFOL 50 MG: 10 INJECTION, EMULSION INTRAVENOUS at 07:45

## 2020-11-11 RX ADMIN — SODIUM CHLORIDE: 900 INJECTION, SOLUTION INTRAVENOUS at 07:34

## 2020-11-11 NOTE — ROUTINE PROCESS
Orlando Becerra  1945  [id number]    Situation:  Verbal report received from: Bess Marcos  Procedure: Procedure(s):  ESOPHAGOGASTRODUODENOSCOPY (EGD)   :-  ESOPHAGOGASTRODUODENAL (EGD) BIOPSY    Background:    Preoperative diagnosis: GERD  Postoperative diagnosis: 1.gastritis    :  Dr. Cass Womack  Assistant(s): Endoscopy Technician-1: Roxana MARTIN  Endoscopy RN-1: Mele Churchill    Specimens:   ID Type Source Tests Collected by Time Destination   1 : stomach bx Preservative Gastric  Rashaun Patterson MD 11/11/2020 7440 Pathology     H. Pylori  -no    Assessment:  Intra-procedure medications   Anesthesia gave intra-procedure sedation and medications, see anesthesia flow sheet. Intravenous fluids: NS@ KVO     Vital signs stable     Abdominal assessment: round and soft     Recommendation:  Discharge patient per MD order.  yes  Family -yes  Permission to share finding with family -yes

## 2020-11-11 NOTE — ANESTHESIA POSTPROCEDURE EVALUATION
Procedure(s):  ESOPHAGOGASTRODUODENOSCOPY (EGD)   :-  ESOPHAGOGASTRODUODENAL (EGD) BIOPSY. MAC    <BSHSIANPOST>    INITIAL Post-op Vital signs: No vitals data found for the desired time range.

## 2020-11-11 NOTE — H&P
[address]  [address], [address]  [phone]                                History and Physical     NAME: Giselle POLLACK:  1945   MRN:  [id number]     HPI:  The patient was seen and examined.     Past Surgical History:   Procedure Laterality Date    COLONOSCOPY N/A 2018    COLONOSCOPY performed by Elaina Iqbal MD at [facility] ENDOSCOPY; tubular adenoma at cecum, hyperplastic polyp at rectum, hemorrhoids, diverticulosis, repeat in 3 yrs    HX BREAST BIOPSY Right     POSITIVE; CANCEROUS    HX BREAST LUMPECTOMY Right     right    HX GI      hemorrhoid surgery - in office     Past Medical History:   Diagnosis Date    Acute pancreatitis 4/10/2017    Due to pancreas divisum    breast cancer May 2010    R breast, stage IA; T1b,pN0, M0; Dr. Shireen Johansen ([facility] [place] [facility]     Eczema 10/9/2014    GERD (gastroesophageal reflux disease)     HTN (hypertension)     Hypercholesterolemia     Osteoporosis     Pancreatitis     Tubular adenoma of colon 2018     Social History     Tobacco Use    Smoking status: Never Smoker    Smokeless tobacco: Never Used   Substance Use Topics    Alcohol use: Yes     Comment: rarely    Drug use: Never     Allergies   Allergen Reactions    Prilosec [Omeprazole] Not Reported This Time     No longer allergic    Zinc Swelling     Jewelry     Family History   Problem Relation Age of Onset    Osteoporosis Mother     Heart Failure Mother     Heart Disease Mother     Colon Cancer Father     Cancer Father         lung & colon    Cancer Sister         skin - BCCA     Current Facility-Administered Medications   Medication Dose Route Frequency    0.9% sodium chloride infusion  50 mL/hr IntraVENous CONTINUOUS    sodium chloride (NS) flush 5-40 mL  5-40 mL IntraVENous Q8H    sodium chloride (NS) flush 5-40 mL  5-40 mL IntraVENous PRN    naloxone (NARCAN) injection 0.4 mg  0.4 mg IntraVENous Multiple    flumazeniL (ROMAZICON) 0.1 mg/mL injection 0.2 mg  0.2 mg IntraVENous Multiple    simethicone (MYLICON) 32VX/0.5IH oral drops 80 mg  1.2 mL Oral Multiple    atropine injection 0.5 mg  0.5 mg IntraVENous ONCE PRN    EPINEPHrine (ADRENALIN) 0.1 mg/mL syringe 1 mg  1 mg Endoscopically ONCE PRN         PHYSICAL EXAM:  General: WD, WN. Alert, cooperative, no acute distress    HEENT: NC, Atraumatic. PERRLA, EOMI. Anicteric sclerae. Lungs:  CTA Bilaterally. No Wheezing/Rhonchi/Rales. Heart:  Regular  rhythm,  No murmur, No Rubs, No Gallops  Abdomen: Soft, Non distended, Non tender. +Bowel sounds, no HSM  Extremities: No c/c/e  Neurologic:  CN 2-12 gi, Alert and oriented X 3. No acute neurological distress   Psych:   Good insight. Not anxious nor agitated. The heart, lungs and mental status were satisfactory for the administration of MAC sedation and for the procedure. Mallampati score: 2     The patient was counseled at length about the risks of montrell Covid-19 in the jeniffer-operative and post-operative states including the recovery window of their procedure. The patient was made aware that montrell Covid-19 after a surgical procedure may worsen their prognosis for recovering from the virus and lend to a higher morbidity and or mortality risk. The patient was given the options of postponing their procedure. All of the risks, benefits, and alternatives were discussed. The patient does  wish to proceed with the procedure.       Assessment:   · Acid reflux    Plan:   · Endoscopic procedure  · MAC sedation   ·

## 2020-11-11 NOTE — PROCEDURES
[address] [facility]  [address], [address]   [phone]                            NAME:  Ewelina Renae   :   1945   MRN:   [id number]     Date/Time:  2020 7:52 AM    Esophagogastroduodenoscopy (EGD) Procedure Note    :  Violet Arriaga MD    Staff: Endoscopy Technician-1: Chucky MARTIN  Endoscopy RN-1: Noé Devlin     Implants: none    Referring Provider:  Tejas Luciano MD    Anethesia/Sedation:  MAC anesthesia    Procedure Details     After infom consent was obtained for the procedure, with all risks and benefits of procedure explained the patient was taken to the endoscopy suite and placed in the left lateral decubitus position. Following sequential administration of sedation as per above, the [id number] gastroscope was inserted into the mouth and advanced under direct vision to second portion of the duodenum. A careful inspection was made as the gastroscope was withdrawn, including a retroflexed view of the proximal stomach; findings and interventions are described below. Findings:  Esophagus:normal  Stomach:mild erythema was noted in  body, antrum  Duodenum/jejunum:normal      Therapies:  biopsy of stomach body, antrum    Specimens: gastric body and antrum            EBL: None    Complications:   None; patient tolerated the procedure well. Impression:    See Postoperative diagnosis above    Recommendations:  -Switch acid suppression to Pantoprazole. , -Await pathology. , -Follow symptoms. ,   -GERD diet: avoid fried and fatty foods. peppermint, chocolate, alcohol, coffee, citrus fruits and juices, tomoato products; avoid lying down for 2 to 3 hours after eating.     Discharge disposition:  Home in the company of  when able to ambulate    Violet Arriaga MD

## 2020-11-11 NOTE — DISCHARGE INSTRUCTIONS
[facility]  [address] [place], [place] [address]  [address] [facility]  [id number]  1945    DISCOMFORT:  Sore throat- throat lozenges or warm salt water gargle  redness at IV site- apply warm compress to area; if redness or soreness persist- contact your physician  Gaseous discomfort- walking, belching will help relieve any discomfort    DIET  You may eat and drink after you leave. You may resume your regular diet - however -  remember your colon is empty and a heavy meal will produce gas. Avoid these foods:  vegetables, fried / greasy foods, carbonated drinks   You may not drink alcoholic beverages for at least 12 hours    ACTIVITY  You may resume your normal daily activities   Spend the remainder of the day resting -  avoid any strenuous activity. You may not operate a vehicle for 12 hours  You may not engage in an occupation involving machinery or appliances for rest of today  Avoid making any critical decisions for at least 24 hour    CALL M.D. ANY SIGN OF   Increasing pain, nausea, vomiting  Abdominal distension (swelling)  New increased bleeding (oral or rectal)  Fever (chills)  Pain in chest area  Bloody discharge from nose or mouth  Shortness of breath    Follow-up Instructions:   Call Dr. Aaron Don for any questions or problems. If we took a biopsy please call the office within 2 weeks to discuss your pathology results. Telephone # [phone]       ENDOSCOPY FINDINGS:   1.gastritis         Learning About Coronavirus ([id number])  Coronavirus [phone]): Overview  What is coronavirus (UWYSF-37)? The coronavirus disease (COVID-19) is caused by a virus. It is an illness that was first found in [place], [place], in December 2019. It has since spread worldwide. The virus can cause fever, cough, and trouble breathing. In severe cases, it can cause pneumonia and make it hard to breathe without help.  It can cause death.  Coronaviruses are a large group of viruses. They cause the common cold. They also cause more serious illnesses like Middle East respiratory syndrome (MERS) and severe acute respiratory syndrome (SARS). COVID-19 is caused by a novel coronavirus. That means it's a new type that has not been seen in people before. This virus spreads person-to-person through droplets from coughing and sneezing. It can also spread when you are close to someone who is infected. And it can spread when you touch something that has the virus on it, such as a doorknob or a tabletop. What can you do to protect yourself from coronavirus (COVID-19)? The best way to protect yourself from getting sick is to:  · Avoid areas where there is an outbreak. · Avoid contact with people who may be infected. · Wash your hands often with soap or alcohol-based hand sanitizers. · Avoid crowds and try to stay at least 6 feet away from other people. · Wash your hands often, especially after you cough or sneeze. Use soap and water, and scrub for at least 20 seconds. If soap and water aren't available, use an alcohol-based hand . · Avoid touching your mouth, nose, and eyes. What can you do to avoid spreading the virus to others? To help avoid spreading the virus to others:  · Cover your mouth with a tissue when you cough or sneeze. Then throw the tissue in the trash. · Use a disinfectant to clean things that you touch often. · Stay home if you are sick or have been exposed to the virus. Don't go to school, work, or public areas. And don't use public transportation. · If you are sick:  ? Leave your home only if you need to get medical care. But call the doctor's office first so they know you're coming. And wear a face mask, if you have one.  ? If you have a face mask, wear it whenever you're around other people. It can help stop the spread of the virus when you cough or sneeze. ? Clean and disinfect your home every day.  Use household  and disinfectant wipes or sprays. Take special care to clean things that you grab with your hands. These include doorknobs, remote controls, phones, and handles on your refrigerator and microwave. And don't forget countertops, tabletops, bathrooms, and computer keyboards. When to call for help  Call 911 anytime you think you may need emergency care. For example, call if:  · You have severe trouble breathing. (You can't talk at all.)  · You have constant chest pain or pressure. · You are severely dizzy or lightheaded. · You are confused or can't think clearly. · Your face and lips have a blue color. · You pass out (lose consciousness) or are very hard to wake up. Call your doctor now if you develop symptoms such as:  · Shortness of breath. · Fever. · Cough. If you need to get care, call ahead to the doctor's office for instructions before you go. Make sure you wear a face mask, if you have one, to prevent exposing other people to the virus. Where can you get the latest information? The following health organizations are tracking and studying this virus. Their websites contain the most up-to-date information. Bib Gerber also learn what to do if you think you may have been exposed to the virus. · U.S. Centers for Disease Control and Prevention (CDC): The CDC provides updated news about the disease and travel advice. The website also tells you how to prevent the spread of infection. www.cdc.gov  · World Health Organization [place] [facility]): WHO offers information about the virus outbreaks. WHO also has travel advice. www.who.int  Current as of: April 1, 2020               Content Version: 12.4  © 1499-1322 Healthwise, Incorporated. Care instructions adapted under license by your healthcare professional. If you have questions about a medical condition or this instruction, always ask your healthcare professional. [address] any warranty or liability for your use of this information.

## 2021-02-12 ENCOUNTER — OFFICE VISIT (OUTPATIENT)
Dept: OBGYN CLINIC | Age: 76
End: 2021-02-12
Payer: MEDICARE

## 2021-02-12 VITALS
BODY MASS INDEX: 22.61 KG/M2 | SYSTOLIC BLOOD PRESSURE: 150 MMHG | HEIGHT: 63 IN | DIASTOLIC BLOOD PRESSURE: 80 MMHG | WEIGHT: 127.6 LBS

## 2021-02-12 DIAGNOSIS — N81.89 PELVIC RELAXATION: Primary | ICD-10-CM

## 2021-02-12 PROCEDURE — 99212 OFFICE O/P EST SF 10 MIN: CPT | Performed by: OBSTETRICS & GYNECOLOGY

## 2021-02-12 PROCEDURE — G8536 NO DOC ELDER MAL SCRN: HCPCS | Performed by: OBSTETRICS & GYNECOLOGY

## 2021-02-12 PROCEDURE — 1090F PRES/ABSN URINE INCON ASSESS: CPT | Performed by: OBSTETRICS & GYNECOLOGY

## 2021-02-12 PROCEDURE — G8754 DIAS BP LESS 90: HCPCS | Performed by: OBSTETRICS & GYNECOLOGY

## 2021-02-12 PROCEDURE — 3017F COLORECTAL CA SCREEN DOC REV: CPT | Performed by: OBSTETRICS & GYNECOLOGY

## 2021-02-12 PROCEDURE — G8432 DEP SCR NOT DOC, RNG: HCPCS | Performed by: OBSTETRICS & GYNECOLOGY

## 2021-02-12 PROCEDURE — 1101F PT FALLS ASSESS-DOCD LE1/YR: CPT | Performed by: OBSTETRICS & GYNECOLOGY

## 2021-02-12 PROCEDURE — G8427 DOCREV CUR MEDS BY ELIG CLIN: HCPCS | Performed by: OBSTETRICS & GYNECOLOGY

## 2021-02-12 PROCEDURE — G8753 SYS BP > OR = 140: HCPCS | Performed by: OBSTETRICS & GYNECOLOGY

## 2021-02-12 PROCEDURE — G9899 SCRN MAM PERF RSLTS DOC: HCPCS | Performed by: OBSTETRICS & GYNECOLOGY

## 2021-02-12 PROCEDURE — G8420 CALC BMI NORM PARAMETERS: HCPCS | Performed by: OBSTETRICS & GYNECOLOGY

## 2021-02-12 NOTE — PROGRESS NOTES
Procedure note: Pessary follow-up    Indications:  Sergey Borden is a 77 y.o. female [facility], No obstetric history on file. who presents for a pessary follow-up. She currently uses 3 size, ring platform type of pessary to control her cystocele, rectocele, uterine, vaginal prolapse. Procedure: The patient was placed in dorsal lithotomy position. Examination confirms the previously noted defect. The pessary was removed and cleansed without difficulty. Speculum examination revealed vaginal vault with minimal ulceration. There was minimal discharge. The pessary was replaced without difficulty. The patient tolerated the procedure well. Post Procedure:  Patient was discharged to home in stable condition. Care instructions were provided.   Now having some incontinence; recommend uro gyn evaluation for possible botox injections

## 2021-02-12 NOTE — PATIENT INSTRUCTIONS
Pessary: Care Instructions  Your Care Instructions     A pessary is a device that fits into your vagina and supports the pelvic organs. It may be used if a pelvic organ sags or moves out of its normal position (prolapse). For some women, wearing a pessary means that they may not have to have surgery to fix a prolapse. A pessary also may help a woman who has trouble controlling her urine (incontinence). Or a pessary may be used during a pregnancy to hold the uterus in place. There are many sizes and types of pessaries. Which type you use depends on the problem you have. Your doctor will make sure the pessary is just right for you. You may need to try different kinds of pessaries to find the best fit. The pessary should hold the pelvic organ in place without causing pain or pressure. You may be able to have sex with your pessary in place. It depends on the type of pessary and your comfort level. Follow-up care is a key part of your treatment and safety. Be sure to make and go to all appointments, and call your doctor if you are having problems. It's also a good idea to know your test results and keep a list of the medicines you take. How can you care for yourself at home? · If you get a vaginal discharge while you have a pessary, talk to your doctor about ways to reduce the discharge and smell. A pessary, in some cases, rubs the vagina and may cause irritation and discharge. If your vagina feels sore, talk to your doctor about a cream or gel to protect the vagina. · Follow your doctor's advice on how long you can wear your pessary before it needs to be cleaned. You may be able to remove and clean it yourself. Or your doctor may want to do this during an office visit. · If you clean your pessary, wash it with mild soap and water. Follow your doctor's advice on inserting the pessary. · Do not douche or use a vaginal wash unless your doctor tells you to do so. · Do not smoke.  Smoking can cause a cough, which makes a prolapse worse. If you need help quitting, talk to your doctor about stop-smoking programs and medicines. These can increase your chances of quitting for good. To help support your pelvic organs  · Avoid activities that put pressure on your pelvic muscles, such as heavy lifting. · Do pelvic floor (Kegel) exercises, which tighten and strengthen pelvic muscles. To do Kegel exercises:  ? Squeeze the same muscles you would use to stop your urine. Your belly and thighs should not move. ? Hold the squeeze for 3 seconds, and then relax for 3 seconds. ? Start with 3 seconds. Then add 1 second each week until you are able to squeeze for 10 seconds. ? Repeat the exercise 10 to 15 times a session. Do three or more sessions each day. · To ease pressure on your vagina, lie down and put a pillow under your knees. You also can lie on your side and bring your knees up to your chest.  When should you call for help? Call your doctor now or seek immediate medical care if:    · You have vaginal discharge that has increased in amount or smells bad.     · You have new or worse belly or pelvic pain. Watch closely for changes in your health, and be sure to contact your doctor if:    · You have problems with the pessary, such as vaginal pain, vaginal bleeding, or problems with urination or bowel movements. Where can you learn more? Go to http://www.gray.com/  Enter X480 in the search box to learn more about \"Pessary: Care Instructions. \"  Current as of: November 8, 2019               Content Version: 12.6  © 9086-6822 [employer]. Care instructions adapted under license by [employer] (which disclaims liability or warranty for this information). If you have questions about a medical condition or this instruction, always ask your healthcare professional. [address] any warranty or liability for your use of this information.

## 2021-02-17 ENCOUNTER — TRANSCRIBE ORDER (OUTPATIENT)
Dept: SCHEDULING | Age: 76
End: 2021-02-17

## 2021-02-17 DIAGNOSIS — Z12.31 VISIT FOR SCREENING MAMMOGRAM: Primary | ICD-10-CM

## 2021-03-02 ENCOUNTER — HOSPITAL ENCOUNTER (OUTPATIENT)
Dept: GENERAL RADIOLOGY | Age: 76
Discharge: HOME | End: 2021-03-02
Payer: MEDICARE

## 2021-03-02 ENCOUNTER — TRANSCRIBE ORDER (OUTPATIENT)
Dept: GENERAL RADIOLOGY | Age: 76
End: 2021-03-02

## 2021-03-02 DIAGNOSIS — M70.61 TROCHANTERIC BURSITIS OF RIGHT HIP: ICD-10-CM

## 2021-03-02 DIAGNOSIS — M15.9 PRIMARY OSTEOARTHRITIS INVOLVING MULTIPLE JOINTS: ICD-10-CM

## 2021-03-02 DIAGNOSIS — M15.1 HEBERDEN'S NODES OF BOTH HANDS: ICD-10-CM

## 2021-03-02 DIAGNOSIS — M15.1 HEBERDEN'S NODES OF BOTH HANDS: Primary | ICD-10-CM

## 2021-03-02 PROCEDURE — 73130 X-RAY EXAM OF HAND: CPT | Performed by: INTERNAL MEDICINE

## 2021-03-02 PROCEDURE — 73502 X-RAY EXAM HIP UNI 2-3 VIEWS: CPT | Performed by: INTERNAL MEDICINE

## 2021-06-09 ENCOUNTER — OFFICE VISIT (OUTPATIENT)
Dept: OBGYN CLINIC | Age: 76
End: 2021-06-09
Payer: MEDICARE

## 2021-06-09 VITALS — WEIGHT: 126 LBS | BODY MASS INDEX: 22.32 KG/M2 | SYSTOLIC BLOOD PRESSURE: 136 MMHG | DIASTOLIC BLOOD PRESSURE: 82 MMHG

## 2021-06-09 DIAGNOSIS — N81.89 PELVIC RELAXATION: Primary | ICD-10-CM

## 2021-06-09 PROCEDURE — G8754 DIAS BP LESS 90: HCPCS | Performed by: OBSTETRICS & GYNECOLOGY

## 2021-06-09 PROCEDURE — G8427 DOCREV CUR MEDS BY ELIG CLIN: HCPCS | Performed by: OBSTETRICS & GYNECOLOGY

## 2021-06-09 PROCEDURE — 99212 OFFICE O/P EST SF 10 MIN: CPT | Performed by: OBSTETRICS & GYNECOLOGY

## 2021-06-09 PROCEDURE — G8420 CALC BMI NORM PARAMETERS: HCPCS | Performed by: OBSTETRICS & GYNECOLOGY

## 2021-06-09 PROCEDURE — G8752 SYS BP LESS 140: HCPCS | Performed by: OBSTETRICS & GYNECOLOGY

## 2021-06-09 PROCEDURE — 1090F PRES/ABSN URINE INCON ASSESS: CPT | Performed by: OBSTETRICS & GYNECOLOGY

## 2021-06-09 PROCEDURE — 3017F COLORECTAL CA SCREEN DOC REV: CPT | Performed by: OBSTETRICS & GYNECOLOGY

## 2021-06-09 PROCEDURE — G8536 NO DOC ELDER MAL SCRN: HCPCS | Performed by: OBSTETRICS & GYNECOLOGY

## 2021-06-09 PROCEDURE — 1101F PT FALLS ASSESS-DOCD LE1/YR: CPT | Performed by: OBSTETRICS & GYNECOLOGY

## 2021-06-09 PROCEDURE — G8432 DEP SCR NOT DOC, RNG: HCPCS | Performed by: OBSTETRICS & GYNECOLOGY

## 2021-06-09 NOTE — PROGRESS NOTES
Procedure note: Pessary follow-up    Indications:  Koffi Mota is a 77 y.o. female WHITE, No obstetric history on file. who presents for a pessary follow-up. She currently uses 3 size, ring platform type of pessary to control her cystocele, rectocele, uterine, vaginal prolapse. Procedure: The patient was placed in dorsal lithotomy position. Examination confirms the previously noted defect. The pessary was removed and cleansed without difficulty. Speculum examination revealed vaginal vault with minimal ulceration. There was minimal discharge. The pessary was replaced without difficulty. The patient tolerated the procedure well. Post Procedure:  Patient was discharged to home in stable condition. Care instructions were provided.

## 2021-06-09 NOTE — PATIENT INSTRUCTIONS
Learning About Birth Control: Diaphragm  What is the diaphragm? The diaphragm is used to prevent pregnancy. A diaphragm is called a barrier method because it keeps the sperm and eggs apart. It is made of rubber and shaped like a dome, and it has a firm, flexible rim. It fits inside your vagina and covers the cervix, which is the opening of the uterus. You use the diaphragm each time you have intercourse. It will last for 1 to 2 years. You need to be fitted for a diaphragm by your doctor and get a prescription for the right size and type of diaphragm. You insert the diaphragm into your vagina no more than 6 hours before intercourse. You leave the diaphragm in place for 6 to 8 hours after intercourse. You cannot leave it in for more than 24 hours. You should always use a diaphragm with a sperm-killing cream or jelly (spermicide). How well does it work? In the first year of use:  · When the diaphragm with spermicide is used exactly as directed, 6 women out of 100 have an unplanned pregnancy. · When it is not used exactly as directed, 12 women out of 100 have an unplanned pregnancy. There is less chance of getting pregnant if you and your partner use a male condom with the diaphragm. Be sure to tell your doctor about any health problems you have or medicines you take. He or she can help you choose the birth control method that is right for you. What are the advantages of the diaphragm? · The diaphragm doesn't use hormones. So you can use the diaphragm if you don't want to take hormones or can't take hormones because you have certain health problems or concerns. · The diaphragm is safe to use while breastfeeding. · It doesn't affect your menstrual cycle. · It costs less than hormonal types of birth control. · The diaphragm can be inserted up to 6 hours ahead of time so you don't have to interrupt sex. · The diaphragm can't be felt by you or your partner.   What are the disadvantages of the diaphragm? · The diaphragm doesn't prevent pregnancy as well as IUDs or hormonal forms of birth control. · It prevents pregnancy only if you use it every time you have intercourse. · The diaphragm doesn't protect against sexually transmitted infections (STIs), such as herpes or HIV. If you're not sure whether your partner might have an STI, use a condom to protect against infection. · The diaphragm may cause you to get more bladder infections (urinary tract infections, or UTIs). This is probably because the rim of the diaphragm presses on the urethra and may irritate it. If you get frequent UTIs, you may need a smaller diaphragm or may prefer to use a different type of birth control. · You may have to interrupt sex to insert the diaphragm. You also have to put spermicide in it every time you use it. Spermicide may cause an allergic reaction. It can cause itching or sores in the vagina or on the penis. · You may not be comfortable with inserting the diaphragm each time you have intercourse. If you think you used the diaphragm incorrectly, you can use emergency contraception to help prevent pregnancy. The most effective emergency contraception is prescribed by a doctor. This includes the copper IUD (inserted by a doctor) or a prescription pill. You can also get emergency contraceptive pills without a prescription at [facility]. Where can you learn more? Go to http://www.gray.com/  Enter R736 in the search box to learn more about \"Learning About Birth Control: Diaphragm. \"  Current as of: October 8, 2020               Content Version: 12.8  © 2006-2021 Healthwise, [facility]. Care instructions adapted under license by [employer] (which disclaims liability or warranty for this information).  If you have questions about a medical condition or this instruction, always ask your healthcare professional. [place] [address] any warranty or liability for your use of this information.

## 2021-07-09 ENCOUNTER — HOSPITAL ENCOUNTER (OUTPATIENT)
Dept: MAMMOGRAPHY | Age: 76
Discharge: HOME | End: 2021-07-09
Attending: FAMILY MEDICINE
Payer: MEDICARE

## 2021-07-09 DIAGNOSIS — Z12.31 VISIT FOR SCREENING MAMMOGRAM: ICD-10-CM

## 2021-07-09 PROCEDURE — 77063 BREAST TOMOSYNTHESIS BI: CPT

## 2021-09-27 NOTE — TELEPHONE ENCOUNTER
Patient to take for only 5 days. Not a chronic medication.
Pharmacist stated these requests can be automatically generated. Patients prescription was filled as prescribed.
PAST MEDICAL HISTORY:  No pertinent past medical history

## 2021-10-08 ENCOUNTER — OFFICE VISIT (OUTPATIENT)
Dept: OBGYN CLINIC | Age: 76
End: 2021-10-08
Payer: MEDICARE

## 2021-10-08 VITALS — BODY MASS INDEX: 22.5 KG/M2 | SYSTOLIC BLOOD PRESSURE: 121 MMHG | WEIGHT: 127 LBS | DIASTOLIC BLOOD PRESSURE: 84 MMHG

## 2021-10-08 DIAGNOSIS — N81.4 CYSTOCELE WITH PROLAPSE: Primary | ICD-10-CM

## 2021-10-08 PROCEDURE — G8754 DIAS BP LESS 90: HCPCS | Performed by: OBSTETRICS & GYNECOLOGY

## 2021-10-08 PROCEDURE — 99212 OFFICE O/P EST SF 10 MIN: CPT | Performed by: OBSTETRICS & GYNECOLOGY

## 2021-10-08 PROCEDURE — 3017F COLORECTAL CA SCREEN DOC REV: CPT | Performed by: OBSTETRICS & GYNECOLOGY

## 2021-10-08 PROCEDURE — G8752 SYS BP LESS 140: HCPCS | Performed by: OBSTETRICS & GYNECOLOGY

## 2021-10-08 PROCEDURE — G8536 NO DOC ELDER MAL SCRN: HCPCS | Performed by: OBSTETRICS & GYNECOLOGY

## 2021-10-08 PROCEDURE — G8432 DEP SCR NOT DOC, RNG: HCPCS | Performed by: OBSTETRICS & GYNECOLOGY

## 2021-10-08 PROCEDURE — G8427 DOCREV CUR MEDS BY ELIG CLIN: HCPCS | Performed by: OBSTETRICS & GYNECOLOGY

## 2021-10-08 PROCEDURE — 1090F PRES/ABSN URINE INCON ASSESS: CPT | Performed by: OBSTETRICS & GYNECOLOGY

## 2021-10-08 PROCEDURE — G8420 CALC BMI NORM PARAMETERS: HCPCS | Performed by: OBSTETRICS & GYNECOLOGY

## 2021-10-08 PROCEDURE — 1101F PT FALLS ASSESS-DOCD LE1/YR: CPT | Performed by: OBSTETRICS & GYNECOLOGY

## 2021-10-08 NOTE — PATIENT INSTRUCTIONS
Pessary: Care Instructions  Overview     A pessary is a device that fits into your vagina and supports the pelvic organs. It may be used if a pelvic organ sags or moves out of its normal position (prolapse). For some women, wearing a pessary means that they may not need to have surgery to fix a prolapse. A pessary also may help a woman who has trouble controlling her urine (incontinence). Or it may be used during a pregnancy to hold the uterus in place. There are many sizes and types of pessaries. Which type you use depends on the problem you have. Your doctor will make sure the pessary is just right for you. You may need to try different kinds to find the best fit. The pessary should hold the pelvic organ in place without causing pain or pressure. You may be able to have sex with your pessary in place. It depends on the type of pessary and your comfort level. Follow-up care is a key part of your treatment and safety. Be sure to make and go to all appointments, and call your doctor if you are having problems. It's also a good idea to know your test results and keep a list of the medicines you take. How can you care for yourself at home? · If you get a vaginal discharge while you have a pessary, talk to your doctor about ways to reduce the discharge and smell. A pessary, in some cases, rubs the vagina and may cause irritation and discharge. If your vagina feels sore, talk to your doctor about a cream or gel to protect the vagina. · Follow your doctor's advice on how long you can wear your pessary before it needs to be cleaned. You may be able to remove and clean it yourself. Or your doctor may want to do this during an office visit. · If you clean your pessary, wash it with mild soap and water. Follow your doctor's advice on inserting the pessary. · Do not douche or use a vaginal wash unless your doctor tells you to do so. · Do not smoke. Smoking can cause a cough, which makes a prolapse worse.  If you need help quitting, talk to your doctor about stop-smoking programs and medicines. These can increase your chances of quitting for good. To help support your pelvic organs  · Avoid activities that put pressure on your pelvic muscles, such as heavy lifting. · Do pelvic floor (Kegel) exercises, which tighten and strengthen pelvic muscles. To do Kegel exercises:  ? Squeeze the same muscles you would use to stop your urine. Your belly and thighs should not move. ? Hold the squeeze for 3 seconds, and then relax for 3 seconds. ? Start with 3 seconds. Then add 1 second each week until you are able to squeeze for 10 seconds. ? Repeat the exercise 10 to 15 times a session. Do three or more sessions each day. · To ease pressure on your vagina, lie down and put a pillow under your knees. You also can lie on your side and bring your knees up to your chest.  When should you call for help? Call your doctor now or seek immediate medical care if:    · You have vaginal discharge that has increased in amount or smells bad.     · You have new or worse belly or pelvic pain. Watch closely for changes in your health, and be sure to contact your doctor if:    · You have problems with the pessary, such as vaginal pain, vaginal bleeding, or problems with urination or bowel movements. Where can you learn more? Go to http://www.gray.com/  Enter X480 in the search box to learn more about \"Pessary: Care Instructions. \"  Current as of: February 11, 2021               Content Version: 13.0  © 2006-2021 Healthwise, Incorporated. Care instructions adapted under license by [employer] (which disclaims liability or warranty for this information). If you have questions about a medical condition or this instruction, always ask your healthcare professional. [place] [address] any warranty or liability for your use of this information.

## 2021-10-08 NOTE — PROGRESS NOTES
Stephen Salazar is a 77 y.o. female who presents for pessary check. No complaints; thrilled wit pessary    Her current method of family planning is vasectomy. The patient is not currently sexually active. She developed this problem approximately a few years ago. LAST NOTE:  Alireza Acosta is a 77 y.o. female WHITE, No obstetric history on file. who presents for a pessary follow-up. She currently uses 3 size, ring platform type of pessary to control her cystocele, rectocele, uterine, vaginal prolapse. Alireza Acosta is a 77 y.o. female WHITE, No obstetric history on file. who presents for a pessary follow-up. She currently uses 3 size, ring platform type of pessary to control her cystocele, rectocele, uterine, vaginal prolapse.      Her relevant past medical history:   Past Medical History:   Diagnosis Date    Acute pancreatitis 4/10/2017    Due to pancreas divisum    breast cancer May 2010    R breast, stage IA; T1b,pN0, M0; Dr. Avni Walton [facility]) 2010    Eczema 10/9/2014    GERD (gastroesophageal reflux disease)     HTN (hypertension)     Hypercholesterolemia     Osteoporosis     Pancreatitis     Tubular adenoma of colon 8/21/2018        Past Surgical History:   Procedure Laterality Date    COLONOSCOPY N/A 4/27/2018    COL[facility] performed [facility] at [facility]COPY; tubular adenoma at cecum, hyperplastic polyp at rectum, hemorrhoids, diverticulosis, repeat in 3 yrs    HX BREAST BIOPSY Right 2010    POSITIVE; CANCEROUS    HX BREAST LUMPECTOMY Right 2010    right    HX GI      hemorrhoid surgery - in office     Social History     Occupational History    Not on file   Tobacco Use    Smoking status: Never Smoker    Smokeless tobacco: Never Used   Substance and Sexual Activity    Alcohol use: Yes     Comment: rarely    Drug use: Never    Sexual activity: Not Currently     Family History   Problem Relation Age of Onset    Osteoporosis Mother    [facility] Heart Failure Mother     Heart Disease Mother     Colon Cancer Father     Cancer Father         lung & colon    Cancer Sister         skin - [facility]       Allergies   Allergen Reactions    Prilosec [Omeprazole] Not Reported This Time     No longer allergic    Zinc Swelling     Jewelry     Prior to Admission medications    Medication Sig Start Date End Date Taking? Authorizing Provider   omeprazole (PRILOSEC) 40 mg capsule Take 40 mg by mouth daily. Yes Provider, Historical   famotidine/Ca carb/mag hydrox (PEPCID COMPLETE PO) Take 1 Tab by mouth daily. Yes Provider, Historical   simethicone (GAS-X PO) Take  by mouth. Takes one po once daily. Yes Provider, Historical   amLODIPine (NORVASC) 2.5 mg tablet TAKE 1 TABLET BY MOUTH DAILY FOR HIGH BLOOD PRESSURE 3/22/20  Yes Sisi Dey MD   atorvastatin (LIPITOR) 20 mg tablet Take 1 Tab by mouth daily. Indications: high cholesterol 9/1/19  Yes Yaa Lanier MD   sucralfate (CARAFATE) 1 gram tablet Take 1 g by mouth four (4) times daily. Provider, Historical   BIOTIN PO Take 250 mg by mouth daily. Provider, Historical   conjugated estrogens (PREMARIN) 0.625 mg/gram vaginal cream Insert 0.5 g into vagina daily.  8/42/99   Kassandra Park MD        Review of Systems - History obtained from the patient  Constitutional: negative for weight loss, fever, night sweats  HEENT: negative for hearing loss, earache, congestion, snoring, sorethroat  CV: negative for chest pain, palpitations, edema  Resp: negative for cough, shortness of breath, wheezing  Breast: negative for breast lumps, nipple discharge, galactorrhea  GI: negative for change in bowel habits, abdominal pain, black or bloody stools  : negative for frequency, dysuria, hematuria  MSK: negative for back pain, joint pain, muscle pain  Skin: negative for itching, rash, hives  Neuro: negative for dizziness, headache, confusion, weakness  Psych: negative for anxiety, depression, change in mood  Heme/lymph: negative for bleeding, bruising, pallor      Objective: There were no vitals taken for this visit.        PHYSICAL EXAMINATION    Constitutional  · Appearance: well-nourished, well developed, alert, in no acute distress    HENT  · Head and Face: appears normal    Neck  · Inspection/Palpation: normal appearance, no masses or tenderness  · Lymph Nodes: no lymphadenopathy present  · Thyroid: gland size normal, nontender, no nodules or masses present on palpation  ·   Breasts  · Inspection of Breasts: breasts symmetrical, no skin changes, no discharge present, nipple appearance normal, no skin retraction present  · Palpation of Breasts and Axillae: no masses present on palpation, no breast tenderness  · Axillary Lymph Nodes: no lymphadenopathy present    Gastrointestinal  · Abdominal Examination: abdomen non-tender to palpation, normal bowel sounds, no masses present  · Liver and spleen: no hepatomegaly present, spleen not palpable  · Hernias: no hernias identified    Genitourinary  · External Genitalia: normal appearance for age, no discharge present, no tenderness present, no inflammatory lesions present, no masses present,  atrophy present  · Vagina: normal vaginal vault without central or paravaginal defects, no discharge present, no inflammatory lesions present, no masses present  · Bladder: non-tender to palpation  · Urethra: appears normal  · Cervix: normal   · Uterus: normal size, shape and consistency  · Adnexa: no adnexal tenderness present, no adnexal masses present  · Perineum: perineum within normal limits, no evidence of trauma, no rashes or skin lesions present  · Anus: anus within normal limits, no hemorrhoids present  · Inguinal Lymph Nodes: no lymphadenopathy present    Skin  · General Inspection: no rash, no lesions identified    Neurologic/Psychiatric  · Mental Status:  · Orientation: grossly oriented to person, place and time  · Mood and Affect: mood normal, affect appropriate    Assessment:    pessary check  Plan:   Doing extremely well; fu 3-4m  Patient declines presence of chaperone during today's visit.

## 2021-10-28 ENCOUNTER — TRANSCRIBE ORDER (OUTPATIENT)
Dept: REGISTRATION | Age: 76
End: 2021-10-28

## 2021-10-28 ENCOUNTER — HOSPITAL ENCOUNTER (OUTPATIENT)
Dept: PREADMISSION TESTING | Age: 76
Discharge: HOME | End: 2021-10-28
Payer: MEDICARE

## 2021-10-28 DIAGNOSIS — Z01.812 PRE-PROCEDURE LAB EXAM: ICD-10-CM

## 2021-10-28 DIAGNOSIS — Z01.812 PRE-PROCEDURE LAB EXAM: Primary | ICD-10-CM

## 2021-10-28 PROCEDURE — U0005 INFEC AGEN DETEC AMPLI PROBE: HCPCS

## 2021-10-29 LAB
SARS-COV-2, XPLCVT: NOT DETECTED
SOURCE, COVRS: NORMAL

## 2021-11-01 ENCOUNTER — ANESTHESIA (OUTPATIENT)
Dept: ENDOSCOPY | Age: 76
End: 2021-11-01
Payer: MEDICARE

## 2021-11-01 ENCOUNTER — ANESTHESIA EVENT (OUTPATIENT)
Dept: ENDOSCOPY | Age: 76
End: 2021-11-01
Payer: MEDICARE

## 2021-11-01 ENCOUNTER — HOSPITAL ENCOUNTER (OUTPATIENT)
Age: 76
Setting detail: OUTPATIENT SURGERY
Discharge: HOME | End: 2021-11-01
Attending: INTERNAL MEDICINE | Admitting: INTERNAL MEDICINE
Payer: MEDICARE

## 2021-11-01 VITALS
SYSTOLIC BLOOD PRESSURE: 131 MMHG | HEART RATE: 69 BPM | RESPIRATION RATE: 17 BRPM | TEMPERATURE: 97.8 F | OXYGEN SATURATION: 100 % | DIASTOLIC BLOOD PRESSURE: 62 MMHG

## 2021-11-01 PROCEDURE — 2709999900 HC NON-CHARGEABLE SUPPLY: Performed by: INTERNAL MEDICINE

## 2021-11-01 PROCEDURE — 74011000250 HC RX REV CODE- 250: Performed by: NURSE ANESTHETIST, CERTIFIED REGISTERED

## 2021-11-01 PROCEDURE — 77030021593 HC FCPS BIOP ENDOSC BSC -A: Performed by: INTERNAL MEDICINE

## 2021-11-01 PROCEDURE — 77030040684 HC NDL ENDOSC NEEDLEMASTR OCOA -B: Performed by: INTERNAL MEDICINE

## 2021-11-01 PROCEDURE — 76040000019: Performed by: INTERNAL MEDICINE

## 2021-11-01 PROCEDURE — 88305 TISSUE EXAM BY PATHOLOGIST: CPT

## 2021-11-01 PROCEDURE — 77030013992 HC SNR POLYP ENDOSC BSC -B: Performed by: INTERNAL MEDICINE

## 2021-11-01 PROCEDURE — 74011250636 HC RX REV CODE- 250/636: Performed by: NURSE ANESTHETIST, CERTIFIED REGISTERED

## 2021-11-01 PROCEDURE — 76060000031 HC ANESTHESIA FIRST 0.5 HR: Performed by: INTERNAL MEDICINE

## 2021-11-01 RX ORDER — ATROPINE SULFATE 0.1 MG/ML
0.5 INJECTION INTRAVENOUS
Status: DISCONTINUED | OUTPATIENT
Start: 2021-11-01 | End: 2021-11-01 | Stop reason: HOSPADM

## 2021-11-01 RX ORDER — SODIUM CHLORIDE 9 MG/ML
INJECTION, SOLUTION INTRAVENOUS
Status: DISCONTINUED | OUTPATIENT
Start: 2021-11-01 | End: 2021-11-01 | Stop reason: HOSPADM

## 2021-11-01 RX ORDER — LIDOCAINE HYDROCHLORIDE 20 MG/ML
INJECTION, SOLUTION EPIDURAL; INFILTRATION; INTRACAUDAL; PERINEURAL AS NEEDED
Status: DISCONTINUED | OUTPATIENT
Start: 2021-11-01 | End: 2021-11-01 | Stop reason: HOSPADM

## 2021-11-01 RX ORDER — DEXTROMETHORPHAN/PSEUDOEPHED 2.5-7.5/.8
1.2 DROPS ORAL
Status: DISCONTINUED | OUTPATIENT
Start: 2021-11-01 | End: 2021-11-01 | Stop reason: HOSPADM

## 2021-11-01 RX ORDER — SODIUM CHLORIDE 0.9 % (FLUSH) 0.9 %
5-40 SYRINGE (ML) INJECTION EVERY 8 HOURS
Status: DISCONTINUED | OUTPATIENT
Start: 2021-11-01 | End: 2021-11-01 | Stop reason: HOSPADM

## 2021-11-01 RX ORDER — SODIUM CHLORIDE 0.9 % (FLUSH) 0.9 %
5-40 SYRINGE (ML) INJECTION AS NEEDED
Status: DISCONTINUED | OUTPATIENT
Start: 2021-11-01 | End: 2021-11-01 | Stop reason: HOSPADM

## 2021-11-01 RX ORDER — SODIUM CHLORIDE 9 MG/ML
50 INJECTION, SOLUTION INTRAVENOUS CONTINUOUS
Status: DISCONTINUED | OUTPATIENT
Start: 2021-11-01 | End: 2021-11-01 | Stop reason: HOSPADM

## 2021-11-01 RX ORDER — EPINEPHRINE 0.1 MG/ML
1 INJECTION INTRACARDIAC; INTRAVENOUS
Status: DISCONTINUED | OUTPATIENT
Start: 2021-11-01 | End: 2021-11-01 | Stop reason: HOSPADM

## 2021-11-01 RX ORDER — FLUMAZENIL 0.1 MG/ML
0.2 INJECTION INTRAVENOUS
Status: DISCONTINUED | OUTPATIENT
Start: 2021-11-01 | End: 2021-11-01 | Stop reason: HOSPADM

## 2021-11-01 RX ORDER — PROPOFOL 10 MG/ML
INJECTION, EMULSION INTRAVENOUS AS NEEDED
Status: DISCONTINUED | OUTPATIENT
Start: 2021-11-01 | End: 2021-11-01 | Stop reason: HOSPADM

## 2021-11-01 RX ORDER — NALOXONE HYDROCHLORIDE 0.4 MG/ML
0.4 INJECTION, SOLUTION INTRAMUSCULAR; INTRAVENOUS; SUBCUTANEOUS
Status: DISCONTINUED | OUTPATIENT
Start: 2021-11-01 | End: 2021-11-01 | Stop reason: HOSPADM

## 2021-11-01 RX ADMIN — PROPOFOL 30 MG: 10 INJECTION, EMULSION INTRAVENOUS at 11:41

## 2021-11-01 RX ADMIN — SODIUM CHLORIDE: 900 INJECTION, SOLUTION INTRAVENOUS at 11:30

## 2021-11-01 RX ADMIN — PROPOFOL 30 MG: 10 INJECTION, EMULSION INTRAVENOUS at 11:38

## 2021-11-01 RX ADMIN — PROPOFOL 40 MG: 10 INJECTION, EMULSION INTRAVENOUS at 11:37

## 2021-11-01 RX ADMIN — LIDOCAINE HYDROCHLORIDE 50 MG: 20 INJECTION, SOLUTION EPIDURAL; INFILTRATION; INTRACAUDAL; PERINEURAL at 11:37

## 2021-11-01 RX ADMIN — PROPOFOL 30 MG: 10 INJECTION, EMULSION INTRAVENOUS at 11:49

## 2021-11-01 RX ADMIN — PROPOFOL 30 MG: 10 INJECTION, EMULSION INTRAVENOUS at 11:40

## 2021-11-01 RX ADMIN — PROPOFOL 30 MG: 10 INJECTION, EMULSION INTRAVENOUS at 11:39

## 2021-11-01 RX ADMIN — PROPOFOL 20 MG: 10 INJECTION, EMULSION INTRAVENOUS at 11:46

## 2021-11-01 RX ADMIN — PROPOFOL 20 MG: 10 INJECTION, EMULSION INTRAVENOUS at 11:43

## 2021-11-01 RX ADMIN — PROPOFOL 30 MG: 10 INJECTION, EMULSION INTRAVENOUS at 11:52

## 2021-11-01 NOTE — PROCEDURES
[address]  [address], [address]  [phone]                              Colonoscopy Procedure Note      Indications:    Family history of coloretal cancer (screening only), Personal history of colon polyps (screening only)     :  Akash Sepulveda MD    Surgical Assistant: Endoscopy RN-1: Pradeep Frazier RN  Endoscopy RN-2: Onofre Boyle, RN    Implants: none    Referring Provider: Kenya Lawler MD    Sedation:  MAC anesthesia    Procedure Details:  After informed consent was obtained with all risks and benefits of procedure explained and preoperative exam completed, the patient was taken to the endoscopy suite and placed in the left lateral decubitus position. Upon sequential sedation as per above, a digital rectal exam was performed  And was normal.  The Olympus videocolonoscope  was inserted in the rectum and carefully advanced to the cecum, which was identified by the ileocecal valve and appendiceal orifice. The quality of preparation was good. The colonoscope was slowly withdrawn with careful evaluation between folds. Retroflexion in the rectum was performed and was normal..     Findings:   Rectum: 1  Sessile polyp(s), the largest 4 mm in size was noted at rectosigmoid junction  Grade 1 internal hemorrhoid(s); Sigmoid: no mucosal lesion appreciated      - Diverticulosis  Descending Colon: no mucosal lesion appreciated  Transverse Colon: no mucosal lesion appreciated  Ascending Colon: 1  Sessile polyp(s), the largest 3 mm in size;  1 flat polyp measuring about 8 mm was noted at the ileocecal valve.;  Cecum: 1  Sessile polyp(s), the largest 3 mm in size; Terminal Ileum: not intubated    Interventions:  injection of 3 cc of ORISE was successful in lifting the polyp at ileocecal valve. Subsequent resection with hot snare was successful.   Tissue was retrieved  3 complete polypectomy were performed using cold biopsy forceps and the polyps were  retrieved    Specimen Removed:    ID Type Source Tests Collected by Time Destination   1 : Cecal polyp Preservative Cecum  Kyler Quinteros MD 11/1/2021 1148 Pathology   2 : Ileocecal valve polyp Preservative Colon, Ileocecal valve  Kyler Quinteros MD 11/1/2021 1148 Pathology   3 : Ascending colon polyp Preservative Colon, Ascending  Kyler Quinteros MD 11/1/2021 1149 Pathology   4 : RectoSigmoid colon polyp Preservative Rectum  Kyler Quinterso MD 11/1/2021 1153 Pathology       Complications: None. EBL:  None. Recommendations: -Await pathology. -Repeat colonoscopy in 3 years.  -High fiber diet.     -Resume normal medication(s). -NO aspirin for 7 days     Discharge Disposition:  Home in the company of a  when able to ambulate.     Álvaro Dunn MD  11/1/2021  11:59 AM

## 2021-11-01 NOTE — ANESTHESIA PREPROCEDURE EVALUATION
Relevant Problems   No relevant active problems       Anesthetic History   No history of anesthetic complications            Review of Systems / Medical History  Patient summary reviewed, nursing notes reviewed and pertinent labs reviewed    Pulmonary  Within defined limits                 Neuro/Psych   Within defined limits           Cardiovascular    Hypertension: well controlled              Exercise tolerance: >4 METS     GI/Hepatic/Renal     GERD           Endo/Other             Other Findings              Physical Exam    Airway  Mallampati: I  TM Distance: > 6 cm  Neck ROM: normal range of motion   Mouth opening: Normal     Cardiovascular    Rhythm: regular           Dental  No notable dental hx       Pulmonary  Breath sounds clear to auscultation               Abdominal         Other Findings            Anesthetic Plan    ASA: 2  Anesthesia type: MAC          Induction: Intravenous  Anesthetic plan and risks discussed with: Patient

## 2021-11-01 NOTE — H&P
[address]  [address] [facility] [address] [facility], [address]  [phone]                                History and Physical     NAME: Joshua Jurado   :  1945   MRN:  [id number]     HPI:  The patient was seen and examined.     Past Surgical History:   Procedure Laterality Date    COLONOSCOPY N/A 2018    COLONOSCOPY performed by Oscar Fitzgerald MD at [facility] ENDOSCOPY; tubular adenoma at cecum, hyperplastic polyp at rectum, hemorrhoids, diverticulosis, repeat in 3 yrs    HX BREAST BIOPSY Right     POSITIVE; CANCEROUS    HX BREAST LUMPECTOMY Right     right    HX GI      hemorrhoid surgery - in office     Past Medical History:   Diagnosis Date    Acute pancreatitis 4/10/2017    Due to pancreas divisum    breast cancer May 2010    R breast, stage IA; T1b,pN0, M0; Dr. Araceli Arenas ([facility]     Eczema 10/9/2014    GERD (gastroesophageal reflux disease)     HTN (hypertension)     Hypercholesterolemia     Osteoporosis     Pancreatitis     Tubular adenoma of colon 2018     Social History     Tobacco Use    Smoking status: Never Smoker    Smokeless tobacco: Never Used   Substance Use Topics    Alcohol use: Yes     Comment: rarely    Drug use: Never     Allergies   Allergen Reactions    Prilosec [Omeprazole] Not Reported This Time     No longer allergic    Zinc Swelling     Jewelry     Family History   Problem Relation Age of Onset    Osteoporosis Mother     Heart Failure Mother     Heart Disease Mother     Colon Cancer Father     Cancer Father         lung & colon    Cancer Sister         skin - [facility]     Current Facility-Administered Medications   Medication Dose Route Frequency    0.9% sodium chloride infusion  50 mL/hr IntraVENous CONTINUOUS    sodium chloride (NS) flush 5-40 mL  5-40 mL IntraVENous Q8H    sodium chloride (NS) flush 5-40 mL  5-40 mL IntraVENous PRN    naloxone (NARCAN) injection 0.4 mg  0.4 mg IntraVENous Multiple    flumazeniL (ROMAZICON) 0.1 mg/mL injection 0.2 mg  0.2 mg IntraVENous Multiple    simethicone (MYLICON) 86MT/0.4MV oral drops 80 mg  1.2 mL Oral Multiple    atropine injection 0.5 mg  0.5 mg IntraVENous ONCE PRN    EPINEPHrine (ADRENALIN) 0.1 mg/mL syringe 1 mg  1 mg Endoscopically ONCE PRN         PHYSICAL EXAM:  General: WD, WN. Alert, cooperative, no acute distress    HEENT: NC, Atraumatic. PERRLA, EOMI. Anicteric sclerae. Lungs:  CTA Bilaterally. No Wheezing/Rhonchi/Rales. Heart:  Regular  rhythm,  No murmur, No Rubs, No Gallops  Abdomen: Soft, Non distended, Non tender. +Bowel sounds, no HSM  Extremities: No c/c/e  Neurologic:  CN 2-12 gi, Alert and oriented X 3. No acute neurological distress   Psych:   Good insight. Not anxious nor agitated. The heart, lungs and mental status were satisfactory for the administration of MAC sedation and for the procedure. Mallampati score: 2     The patient was counseled at length about the risks of montrell Covid-19 in the jeniffer-operative and post-operative states including the recovery window of their procedure. The patient was made aware that montrell Covid-19 after a surgical procedure may worsen their prognosis for recovering from the virus and lend to a higher morbidity and or mortality risk. The patient was given the options of postponing their procedure. All of the risks, benefits, and alternatives were discussed. The patient does  wish to proceed with the procedure.       Assessment:   · Personal history colon polyps  · FH colon cancer    Plan:   · Endoscopic procedure  · MAC sedation

## 2021-11-01 NOTE — ANESTHESIA POSTPROCEDURE EVALUATION
Post-Anesthesia Evaluation and Assessment    Patient: Arjun Darnell MRN: [id number]  SSN: [id number]    YOB: 1945  Age: 77 y.o. Sex: female      I have evaluated the patient and they are stable and ready for discharge from the PACU. Cardiovascular Function/Vital Signs  Visit Vitals  BP (!) 113/52   Pulse 77   Resp 17   Ht (P) 5' 2\" (1.575 m)   Wt (P) 55.3 kg (122 lb)   SpO2 100%   BMI (P) 22.31 kg/m²       Patient is status post MAC anesthesia for Procedure(s):  COLONOSCOPY   :-  ENDOSCOPIC POLYPECTOMY  ENDOSCOPIC MUCOSAL RESECTION. Nausea/Vomiting: None    Postoperative hydration reviewed and adequate. Pain:      Managed    Neurological Status: At baseline    Mental Status, Level of Consciousness: Alert and  oriented to person, place, and time    Pulmonary Status:   O2 Device: CO2 nasal cannula (11/01/21 1155)   Adequate oxygenation and airway patent    Complications related to anesthesia: None    Post-anesthesia assessment completed. No concerns    Signed By: Allyson Marie MD     November 1, 2021              Procedure(s):  COLONOSCOPY   :-  ENDOSCOPIC POLYPECTOMY  ENDOSCOPIC MUCOSAL RESECTION.     MAC    <BSHSIANPOST>    INITIAL Post-op Vital signs:   Vitals Value Taken Time   /52 11/01/21 1155   Temp     Pulse 77 11/01/21 1155   Resp 17 11/01/21 1155   SpO2 100 % 11/01/21 1155

## 2021-11-01 NOTE — DISCHARGE INSTRUCTIONS
118 [facility].  217 [facility] [address], [address]  215 [facility].  [id number]  1945    COLON DISCHARGE INSTRUCTIONS    DISCOMFORT:  Redness at IV site- apply warm compress to area; if redness or soreness persist- contact your physician  There may be a slight amount of blood passed from the rectum  Gaseous discomfort- walking, belching will help relieve any discomfort      DIET:   High fiber diet. - however -  remember your colon is empty and a heavy meal will produce gas. Avoid these foods:  vegetables, fried / greasy foods, carbonated drinks for today  You may not  drink alcoholic beverages for at least 12 hours     ACTIVITY:  It is recommended that you spend the remainder of the day resting -  avoid any strenuous activity. You may not operate a vehicle for 12 hours  You may not  engage in an occupation involving machinery or appliances for rest of today    Avoid making any critical decisions for at least 24 hour    CALL M.D. ANY SIGN OF:   Increasing pain, nausea, vomiting  Abdominal distension (swelling)  New increased bleeding (oral or rectal)  Fever (chills)  Pain in chest area  Bloody discharge from nose or mouth  Shortness of breath    You may not  take any Advil, Aspirin, Ibuprofen, Motrin, Aleve, or Goodys for 7 days, ONLY  Tylenol as needed for pain. Post procedure diagnosis: Diverticulosis  Hemorrhoids  Colon Polyps      Post-procedure recommendations:  -Await pathology. -Repeat colonoscopy in 3 years.  -High fiber diet.     -Resume normal medication(s). -NO aspirin for 7 days     Follow-up Instructions:    Call Dr. Paola Gonzales for any questions or problems. If we took a biopsy please call the office within 2 weeks to discuss your  pathology results. Telephone # [phone]         Learning About Coronavirus [phone])  Coronavirus [phone]): Overview  What is coronavirus (COVID-19)?   The coronavirus disease (COVID-19) is caused by a virus. It is an illness that was first found in [place], [place], in December 2019. It has since spread worldwide. The virus can cause fever, cough, and trouble breathing. In severe cases, it can cause pneumonia and make it hard to breathe without help. It can cause death. Coronaviruses are a large group of viruses. They cause the common cold. They also cause more serious illnesses like Middle East respiratory syndrome (MERS) and severe acute respiratory syndrome (SARS). COVID-19 is caused by a novel coronavirus. That means it's a new type that has not been seen in people before. This virus spreads person-to-person through droplets from coughing and sneezing. It can also spread when you are close to someone who is infected. And it can spread when you touch something that has the virus on it, such as a doorknob or a tabletop. What can you do to protect yourself from coronavirus (COVID-19)? The best way to protect yourself from getting sick is to:  · Avoid areas where there is an outbreak. · Avoid contact with people who may be infected. · Wash your hands often with soap or alcohol-based hand sanitizers. · Avoid crowds and try to stay at least 6 feet away from other people. · Wash your hands often, especially after you cough or sneeze. Use soap and water, and scrub for at least 20 seconds. If soap and water aren't available, use an alcohol-based hand . · Avoid touching your mouth, nose, and eyes. What can you do to avoid spreading the virus to others? To help avoid spreading the virus to others:  · Cover your mouth with a tissue when you cough or sneeze. Then throw the tissue in the trash. · Use a disinfectant to clean things that you touch often. · Stay home if you are sick or have been exposed to the virus. Don't go to school, work, or public areas. And don't use public transportation. · If you are sick:  ? Leave your home only if you need to get medical care.  But call the doctor's office first so they know you're coming. And wear a face mask, if you have one.  ? If you have a face mask, wear it whenever you're around other people. It can help stop the spread of the virus when you cough or sneeze. ? Clean and disinfect your home every day. Use household  and disinfectant wipes or sprays. Take special care to clean things that you grab with your hands. These include doorknobs, remote controls, phones, and handles on your refrigerator and microwave. And don't forget countertops, tabletops, bathrooms, and computer keyboards. When to call for help  Call 911 anytime you think you may need emergency care. For example, call if:  · You have severe trouble breathing. (You can't talk at all.)  · You have constant chest pain or pressure. · You are severely dizzy or lightheaded. · You are confused or can't think clearly. · Your face and lips have a blue color. · You pass out (lose consciousness) or are very hard to wake up. Call your doctor now if you develop symptoms such as:  · Shortness of breath. · Fever. · Cough. If you need to get care, call ahead to the doctor's office for instructions before you go. Make sure you wear a face mask, if you have one, to prevent exposing other people to the virus. Where can you get the latest information? The following health organizations are tracking and studying this virus. Their websites contain the most up-to-date information. Oksana Samaniego also learn what to do if you think you may have been exposed to the virus. · U.S. Centers for Disease Control and Prevention (CDC): The CDC provides updated news about the disease and travel advice. The website also tells you how to prevent the spread of infection. www.cdc.gov  · World Health Organization [facility]): WHO offers information about the virus outbreaks.  WHO also has travel advice. www.who.int  Current as of: April 1, 2020               Content Version: 12.4  © 8290-5900 Healthwise Incorporated. Care instructions adapted under license by your healthcare professional. If you have questions about a medical condition or this instruction, always ask your healthcare professional. [address] any warranty or liability for your use of this information. Patient Education        Colon Polyps: Care Instructions  Your Care Instructions     Colon polyps are growths in the colon or the rectum. The cause of most colon polyps is not known, and most people who get them do not have any problems. But a certain kind can turn into cancer. For this reason, regular testing for colon polyps is important for people as they get older. It is also important for anyone who has an increased risk for colon cancer. Polyps are usually found through routine colon cancer screening tests. Although most colon polyps are not cancerous, they are usually removed and then tested for cancer. Screening for colon cancer saves lives because the cancer can usually be cured if it is caught early. If you have a polyp that is the type that can turn into cancer, you may need more tests to examine your entire colon. The doctor will remove any other polyps that he or she finds, and you will be tested more often. Follow-up care is a key part of your treatment and safety. Be sure to make and go to all appointments, and call your doctor if you are having problems. It's also a good idea to know your test results and keep a list of the medicines you take. How can you care for yourself at home? Regular exams to look for colon polyps are the best way to prevent polyps from turning into colon cancer. These can include stool tests, sigmoidoscopy, colonoscopy, and CT colonography. Talk with your doctor about a testing schedule that is right for you. To prevent polyps  There is no home treatment that can prevent colon polyps. But these steps may help lower your risk for cancer. · Stay active.  Being active can help you get to and stay at a healthy weight. Try to exercise on most days of the week. Walking is a good choice. · Eat well. Choose a variety of vegetables, fruits, legumes (such as peas and beans), fish, poultry, and whole grains. · Do not smoke. If you need help quitting, talk to your doctor about stop-smoking programs and medicines. These can increase your chances of quitting for good. · If you drink alcohol, limit how much you drink. Limit alcohol to 2 drinks a day for men and 1 drink a day for women. When should you call for help? Call your doctor now or seek immediate medical care if:    · You have severe belly pain.     · Your stools are maroon or very bloody. Watch closely for changes in your health, and be sure to contact your doctor if:    · You have a fever.     · You have nausea or vomiting.     · You have a change in bowel habits (new constipation or diarrhea).     · Your symptoms get worse or are not improving as expected. Where can you learn more? Go to http://www.huddleston.com/  Enter C571 in the search box to learn more about \"Colon Polyps: Care Instructions. \"  Current as of: December 17, 2020               Content Version: 13.0  © 7016-6509 [employer]. Care instructions adapted under license by [employer] (which disclaims liability or warranty for this information). If you have questions about a medical condition or this instruction, always ask your healthcare professional. [place] [address] any warranty or liability for your use of this information. Patient Education        Diverticulosis: Care Instructions  Your Care Instructions  In diverticulosis, pouches called diverticula form in the wall of the large intestine (colon). The pouches do not cause any pain or other symptoms. Most people who have diverticulosis do not know they have it.  But the pouches sometimes bleed, and if they become infected, they can cause pain and other symptoms. When this happens, it is called diverticulitis. Diverticula form when pressure pushes the wall of the colon outward at certain weak points. A diet that is too low in fiber can cause diverticula. Follow-up care is a key part of your treatment and safety. Be sure to make and go to all appointments, and call your doctor if you are having problems. It's also a good idea to know your test results and keep a list of the medicines you take. How can you care for yourself at home? · Include fruits, leafy green vegetables, beans, and whole grains in your diet each day. These foods are high in fiber. · Take a fiber supplement, such as Citrucel or Metamucil, every day if needed. Read and follow all instructions on the label. · Drink plenty of fluids. If you have kidney, heart, or liver disease and have to limit fluids, talk with your doctor before you increase the amount of fluids you drink. · Get at least 30 minutes of exercise on most days of the week. Walking is a good choice. You also may want to do other activities, such as running, swimming, cycling, or playing tennis or team sports. · Cut out foods that cause gas, pain, or other symptoms. When should you call for help? Call your doctor now or seek immediate medical care if:    · You have belly pain.     · You pass maroon or very bloody stools.     · You have a fever.     · You have nausea and vomiting.     · You have unusual changes in your bowel movements or abdominal swelling.     · You have burning pain when you urinate.     · You have abnormal vaginal discharge.     · You have shoulder pain.     · You have cramping pain that does not get better when you have a bowel movement or pass gas.     · You pass gas or stool from your urethra while urinating. Watch closely for changes in your health, and be sure to contact your doctor if you have any problems. Where can you learn more?   Go to http://www.gray.com/  Enter [id number] in the search box to learn more about \"Diverticulosis: Care Instructions. \"  Current as of: February 10, 2021               Content Version: 13.0  © 2006-2021 [employer]. Care instructions adapted under license by [employer] (which disclaims liability or warranty for this information). If you have questions about a medical condition or this instruction, always ask your healthcare professional. [place] [address] any warranty or liability for your use of this information. Patient Education        Hemorrhoids: Care Instructions  Overview     Hemorrhoids are swollen veins that develop in the anal canal. Bleeding during bowel movements, itching, and rectal pain are the most common symptoms. Hemorrhoids can be uncomfortable at times, but rarely are they a serious problem. Most of the time, you can treat them with simple changes to your diet and bowel habits. These changes include eating more fiber and not straining to pass stools. Most hemorrhoids don't need surgery or other treatment unless they are very large and painful or bleed a lot. Follow-up care is a key part of your treatment and safety. Be sure to make and go to all appointments, and call your doctor if you are having problems. It's also a good idea to know your test results and keep a list of the medicines you take. How can you care for yourself at home? · Sit in a few inches of warm water (sitz bath) 3 times a day and after bowel movements. The warm water helps with pain and itching. · Put ice on your anal area several times a day for 10 minutes at a time. Put a thin cloth between the ice and your skin. Follow this by placing a warm, wet towel on the area for another 10 to 20 minutes. · Take pain medicines exactly as directed. ? If the doctor gave you a prescription medicine for pain, take it as prescribed.   ? If you are not taking a prescription pain medicine, ask your doctor if you can take an over-the-counter medicine. · Keep the anal area clean, but be gentle. Use water and a fragrance-free soap, or use baby wipes or medicated pads such as Tucks. · Wear cotton underwear and loose clothing to decrease moisture in the anal area. · Eat more fiber. Include foods such as whole-grain breads and cereals, raw vegetables, raw and dried fruits, and beans. · Drink plenty of fluids. If you have kidney, heart, or liver disease and have to limit fluids, talk with your doctor before you increase the amount of fluids you drink. · Use a stool softener that contains bran or psyllium. You can save money by buying bran or psyllium (available in bulk at most health food stores) and sprinkling it on foods or stirring it into fruit juice. Or you can use a product such as Metamucil or Hydrocil. · Practice healthy bowel habits. ? Go to the bathroom as soon as you have the urge. ? Avoid straining to pass stools. Relax and give yourself time to let things happen naturally. ? Do not hold your breath while passing stools. ? Do not read while sitting on the toilet. Get off the toilet as soon as you have finished. · Take your medicines exactly as prescribed. Call your doctor if you think you are having a problem with your medicine. When should you call for help? Call 911 anytime you think you may need emergency care. For example, call if:    · You pass maroon or very bloody stools. Call your doctor now or seek immediate medical care if:    · You have increased pain.     · You have increased bleeding. Watch closely for changes in your health, and be sure to contact your doctor if:    · Your symptoms have not improved after 3 or 4 days. Where can you learn more? Go to http://www.[employer].com/  Enter F228 in the search box to learn more about \"Hemorrhoids: Care Instructions. \"  Current as of: February 10, 2021               Content Version: 13.0  © 6583-5010 Healthwise, Incorporated. Care instructions adapted under license by [employer] (which disclaims liability or warranty for this information). If you have questions about a medical condition or this instruction, always ask your healthcare professional. [address] any warranty or liability for your use of this information.

## 2022-02-08 ENCOUNTER — OFFICE VISIT (OUTPATIENT)
Dept: OBGYN CLINIC | Age: 77
End: 2022-02-08
Payer: MEDICARE

## 2022-02-08 VITALS — BODY MASS INDEX: 22.85 KG/M2 | WEIGHT: 129 LBS

## 2022-02-08 DIAGNOSIS — Z46.89 PESSARY MAINTENANCE: Primary | ICD-10-CM

## 2022-02-08 PROCEDURE — G8432 DEP SCR NOT DOC, RNG: HCPCS | Performed by: OBSTETRICS & GYNECOLOGY

## 2022-02-08 PROCEDURE — G8756 NO BP MEASURE DOC: HCPCS | Performed by: OBSTETRICS & GYNECOLOGY

## 2022-02-08 PROCEDURE — G8536 NO DOC ELDER MAL SCRN: HCPCS | Performed by: OBSTETRICS & GYNECOLOGY

## 2022-02-08 PROCEDURE — 99212 OFFICE O/P EST SF 10 MIN: CPT | Performed by: OBSTETRICS & GYNECOLOGY

## 2022-02-08 PROCEDURE — G8420 CALC BMI NORM PARAMETERS: HCPCS | Performed by: OBSTETRICS & GYNECOLOGY

## 2022-02-08 PROCEDURE — G8427 DOCREV CUR MEDS BY ELIG CLIN: HCPCS | Performed by: OBSTETRICS & GYNECOLOGY

## 2022-02-08 PROCEDURE — 1101F PT FALLS ASSESS-DOCD LE1/YR: CPT | Performed by: OBSTETRICS & GYNECOLOGY

## 2022-02-08 PROCEDURE — 1090F PRES/ABSN URINE INCON ASSESS: CPT | Performed by: OBSTETRICS & GYNECOLOGY

## 2022-02-08 PROCEDURE — 3017F COLORECTAL CA SCREEN DOC REV: CPT | Performed by: OBSTETRICS & GYNECOLOGY

## 2022-02-08 NOTE — PROGRESS NOTES
Giovanna Vidales is a 77 y.o. female who presents for pessary check. She currently uses 3 size, ring platform type of pessary to control her cystocele, rectocele, uterine, vaginal prolapse. On 11/1/21, she had a colonoscopy (pt states had some precancerous poylps and was instructed to return in 3 years). She stated bladder infection followed - took 4 rounds of abx to clear infection. No sxs of UTI today. States when she uses a wash cloth and washes her vaginal area that she sees a brownish discharge.          Her relevant past medical history:   Past Medical History:   Diagnosis Date    Acute pancreatitis 4/10/2017    Due to pancreas divisum    breast cancer May 2010    R breast, stage IA; T1b,pN0, M0; [facility]) 2010    Eczema 10/9/2014    GERD (gastroesophageal reflux disease)     HTN (hypertension)     Hypercholesterolemia     Osteoporosis     Pancreatitis     Tubular adenoma of colon 8/21/2018        Past Surgical History:   Procedure Laterality Date    COLONOSCOPY N/A 4/27/2018    COL[facility]COPY [facility] at [facility] ENDOSCOPY; tubular adenoma at cecum, hyperplastic polyp at rectum, hemorrhoids, diverticulosis, repeat in 3 yrs    COLONOSCOPY N/A 11/1/2021    COLONOSCOPY   :- performed by Fermin Maxwell MD at [address] HX BREAST BIOPSY Right 2010    POSITIVE; CANCEROUS    HX BREAST LUMPECTOMY Right 2010    right    HX GI      hemorrhoid surgery - in office     Social History     Occupational History    Not on file   Tobacco Use    Smoking status: Never Smoker    Smokeless tobacco: Never Used   Substance and Sexual Activity    Alcohol use: Yes     Comment: rarely    Drug use: Never    Sexual activity: Not Currently     Family History   Problem Relation Age of Onset    Osteoporosis Mother     Heart Failure Mother     Heart Disease Mother     Colon Cancer Father     Cancer Father         lung & colon  Cancer Sister         skin - [facility]       Allergies   Allergen Reactions    Prilosec [Omeprazole] Not Reported This Time     No longer allergic    Zinc Swelling     Jewelry     Prior to Admission medications    Medication Sig Start Date End Date Taking? Authorizing Provider   omeprazole (PRILOSEC) 40 mg capsule Take 40 mg by mouth daily. Provider, Historical   famotidine/Ca carb/mag hydrox (PEPCID COMPLETE PO) Take 1 Tab by mouth daily. Patient not taking: Reported on 11/1/2021    Provider, Historical   simethicone (GAS-X PO) Take  by mouth. Takes one po once daily. Provider, Historical   amLODIPine (NORVASC) 2.5 mg tablet TAKE 1 TABLET BY MOUTH DAILY FOR HIGH BLOOD PRESSURE 3/22/20   Srikanth Pearl MD   atorvastatin (LIPITOR) 20 mg tablet Take 1 Tab by mouth daily. Indications: high cholesterol 9/1/19   Srikanth Pearl MD        Review of Systems - History obtained from the patient  Constitutional: negative for weight loss, fever, night sweats  HEENT: negative for hearing loss, earache, congestion, snoring, sorethroat  CV: negative for chest pain, palpitations, edema  Resp: negative for cough, shortness of breath, wheezing  Breast: negative for breast lumps, nipple discharge, galactorrhea  GI: negative for change in bowel habits, abdominal pain, black or bloody stools  : negative for frequency, dysuria, hematuria  MSK: negative for back pain, joint pain, muscle pain  Skin: negative for itching, rash, hives  Neuro: negative for dizziness, headache, confusion, weakness  Psych: negative for anxiety, depression, change in mood  Heme/lymph: negative for bleeding, bruising, pallor      Objective: There were no vitals taken for this visit.        PHYSICAL EXAMINATION    Constitutional  · Appearance: well-nourished, well developed, alert, in no acute distress    HENT  · Head and Face: appears normal    Gastrointestinal  · Abdominal Examination: abdomen non-tender to palpation, normal bowel sounds, no masses present  · Liver and spleen: no hepatomegaly present, spleen not palpable  · Hernias: no hernias identified    Genitourinary  · External Genitalia: normal appearance for age, no discharge present, no tenderness present, no inflammatory lesions present, no masses present,  atrophy present  · Vagina: normal vaginal vault without central or paravaginal defects, no discharge present, no inflammatory lesions present, no masses present  · Bladder: non-tender to palpation  · Urethra: appears normal  · Cervix: normal   · Uterus: normal size, shape and consistency  · Adnexa: no adnexal tenderness present, no adnexal masses present  · Perineum: perineum within normal limits, no evidence of trauma, no rashes or skin lesions present  · Anus: anus within normal limits, no hemorrhoids present  · Inguinal Lymph Nodes: no lymphadenopathy present    Skin  · General Inspection: no rash, no lesions identified    Neurologic/Psychiatric  · Mental Status:  · Orientation: grossly oriented to person, place and time  · Mood and Affect: mood normal, affect appropriate    Assessment:    pessary check  Plan:   Doing extremely well; fu 3-4m  Patient declines presence of chaperone during today's visit.

## 2022-02-08 NOTE — PATIENT INSTRUCTIONS
8 Common Questions About the COVID-19 Vaccine  Here are the answers to some questions and concerns that many people ask. Why should I get a COVID-19 vaccine? It will help protect you, protect others, and end the pandemic. Getting a vaccine will help you avoid catching COVID-19. (If you do catch it, your symptoms will most likely be less severe than if you hadn't gotten the vaccine.) Getting a vaccine also helps you protect the people around youpeople who could have serious problems if they catch the virus. Are COVID-19 vaccines safe? COVID-19 vaccines are safe and effective. They have been given to millions of people. The risk of serious problems is very low. Could I get COVID-19 from a vaccine? No, you can't get COVID-19 from a vaccine. The vaccines don't contain the COVID-19 virus, so they can't cause the disease. What are the side effects of COVID-19 vaccines? You might not have any side effects. If you do, they'll probably be a lot like the common side effects of other vaccinesa fever, fatigue, and soreness. (These are signs that your immune system is learning how to fight the virus.) The side effects don't last long, and they can be treated if they bother you. How many doses of a vaccine will I need? You may need 1 or 2 doses of a vaccine. And you might need \"booster\" doses later to help you stay protected. Do I need a vaccine if I've already had COVID-19? Yes. If you've had COVID-19, you may still be able to catch it again. Getting a vaccine will give you extra protection. Will I still need to wear a face mask after I get a vaccine? No and maybe. Once you are fully vaccinated, you can resume activities without wearing a mask unless required by other rules. Be sure to follow all instructions from your local health authorities and the CDC. Why not just get COVID-19 and skip a vaccine?   The risk of serious problems from the virus is much higher than the risk of serious problems from a vaccine. COVID-19 is unpredictable. Even if you're young and healthy, you could get very sick, have long-term health problems, or die. So it's much safer to get a vaccine than it is to catch COVID-19. The COVID-19 vaccines are one of the best to help stop the pandemic. So get vaccinated. Current as of: March 26, 2021               Content Version: 13.0  © 2006-2021 Healthwise, [employer]. Care instructions adapted under license by [employer] (which disclaims liability or warranty for this information). If you have questions about a medical condition or this instruction, always ask your healthcare professional. [place] [address] any warranty or liability for your use of this information.

## 2022-03-18 PROBLEM — N81.9 PROLAPSE OF FEMALE GENITAL ORGANS: Status: ACTIVE | Noted: 2020-01-27

## 2022-03-19 PROBLEM — I10 ESSENTIAL HYPERTENSION: Status: ACTIVE | Noted: 2019-08-27

## 2022-05-09 ENCOUNTER — OFFICE VISIT (OUTPATIENT)
Dept: OBGYN CLINIC | Age: 77
End: 2022-05-09
Payer: MEDICARE

## 2022-05-09 VITALS — DIASTOLIC BLOOD PRESSURE: 90 MMHG | SYSTOLIC BLOOD PRESSURE: 150 MMHG | BODY MASS INDEX: 22.85 KG/M2 | WEIGHT: 129 LBS

## 2022-05-09 DIAGNOSIS — Z46.89 PESSARY MAINTENANCE: Primary | ICD-10-CM

## 2022-05-09 PROCEDURE — G8755 DIAS BP > OR = 90: HCPCS | Performed by: OBSTETRICS & GYNECOLOGY

## 2022-05-09 PROCEDURE — G8420 CALC BMI NORM PARAMETERS: HCPCS | Performed by: OBSTETRICS & GYNECOLOGY

## 2022-05-09 PROCEDURE — 99212 OFFICE O/P EST SF 10 MIN: CPT | Performed by: OBSTETRICS & GYNECOLOGY

## 2022-05-09 PROCEDURE — G8753 SYS BP > OR = 140: HCPCS | Performed by: OBSTETRICS & GYNECOLOGY

## 2022-05-09 PROCEDURE — G8536 NO DOC ELDER MAL SCRN: HCPCS | Performed by: OBSTETRICS & GYNECOLOGY

## 2022-05-09 PROCEDURE — 1090F PRES/ABSN URINE INCON ASSESS: CPT | Performed by: OBSTETRICS & GYNECOLOGY

## 2022-05-09 PROCEDURE — G8427 DOCREV CUR MEDS BY ELIG CLIN: HCPCS | Performed by: OBSTETRICS & GYNECOLOGY

## 2022-05-09 PROCEDURE — 1101F PT FALLS ASSESS-DOCD LE1/YR: CPT | Performed by: OBSTETRICS & GYNECOLOGY

## 2022-05-09 PROCEDURE — G8432 DEP SCR NOT DOC, RNG: HCPCS | Performed by: OBSTETRICS & GYNECOLOGY

## 2022-05-09 NOTE — PROGRESS NOTES
Procedure note: Pessary follow-up    Indications:  Shad Romero is a 69 y.o. female WHITE/NON-, No obstetric history on file. who presents for a pessary follow-up. She currently uses 3 size, ringform type of pessary to control her cystocele, rectocele, uterine, vaginal prolapse. Procedure: The patient was placed in dorsal lithotomy position. Examination confirms the previously noted defect. The pessary was removed and cleansed without difficulty. Speculum examination revealed vaginal vault with minimal ulceration. There was minimal discharge. The pessary was replaced without difficulty. The patient tolerated the procedure well. Post Procedure:  Patient was discharged to home in stable condition. Care instructions were provided.

## 2022-05-09 NOTE — PATIENT INSTRUCTIONS
Pessary: Care Instructions  Overview     A pessary is a device that fits into your vagina and supports the pelvic organs. It may be used if a pelvic organ sags or moves out of its normal position (prolapse). For some women, wearing a pessary means that they may not need to have surgery to fix a prolapse. A pessary also may help a woman who has trouble controlling her urine (incontinence). Or it may be used during a pregnancy to hold the uterus in place. There are many sizes and types of pessaries. Which type you use depends on the problem you have. Your doctor will make sure the pessary is just right for you. You may need to try different kinds to find the best fit. The pessary should hold the pelvic organ in place without causing pain or pressure. You may be able to have sex with your pessary in place. It depends on the type of pessary and your comfort level. Follow-up care is a key part of your treatment and safety. Be sure to make and go to all appointments, and call your doctor if you are having problems. It's also a good idea to know your test results and keep a list of the medicines you take. How can you care for yourself at home? · If you get a vaginal discharge while you have a pessary, talk to your doctor about ways to reduce the discharge and smell. A pessary, in some cases, rubs the vagina and may cause irritation and discharge. If your vagina feels sore, talk to your doctor about a cream or gel to protect the vagina. · Follow your doctor's advice on how long you can wear your pessary before it needs to be cleaned. You may be able to remove and clean it yourself. Or your doctor may want to do this during an office visit. · If you clean your pessary, wash it with mild soap and water. Follow your doctor's advice on inserting the pessary. · Do not douche or use a vaginal wash unless your doctor tells you to do so. · Do not smoke. Smoking can cause a cough, which makes a prolapse worse.  If you need help quitting, talk to your doctor about stop-smoking programs and medicines. These can increase your chances of quitting for good. To help support your pelvic organs  · Avoid activities that put pressure on your pelvic muscles, such as heavy lifting. · Do pelvic floor (Kegel) exercises, which tighten and strengthen pelvic muscles. To do Kegel exercises:  ? Squeeze the same muscles you would use to stop your urine. Your belly and thighs should not move. ? Hold the squeeze for 3 seconds, and then relax for 3 seconds. ? Start with 3 seconds. Then add 1 second each week until you are able to squeeze for 10 seconds. ? Repeat the exercise 10 to 15 times a session. Do three or more sessions each day. · To ease pressure on your vagina, lie down and put a pillow under your knees. You also can lie on your side and bring your knees up to your chest.  When should you call for help? Call your doctor now or seek immediate medical care if:    · You have vaginal discharge that has increased in amount or smells bad.     · You have new or worse belly or pelvic pain. Watch closely for changes in your health, and be sure to contact your doctor if:    · You have problems with the pessary, such as vaginal pain, vaginal bleeding, or problems with urination or bowel movements. Where can you learn more? Go to http://www.gray.com/  Enter X480 in the search box to learn more about \"Pessary: Care Instructions. \"  Current as of: November 22, 2021               Content Version: 13.2  © 2006-2022 [employer]. Care instructions adapted under license by [employer] (which disclaims liability or warranty for this information). If you have questions about a medical condition or this instruction, always ask your healthcare professional. [place] [address] any warranty or liability for your use of this information.

## 2022-05-14 ENCOUNTER — APPOINTMENT (OUTPATIENT)
Dept: GENERAL RADIOLOGY | Age: 77
End: 2022-05-14
Attending: PHYSICIAN ASSISTANT
Payer: MEDICARE

## 2022-05-14 ENCOUNTER — HOSPITAL ENCOUNTER (EMERGENCY)
Age: 77
Discharge: HOME | End: 2022-05-14
Attending: EMERGENCY MEDICINE
Payer: MEDICARE

## 2022-05-14 VITALS
DIASTOLIC BLOOD PRESSURE: 64 MMHG | OXYGEN SATURATION: 96 % | TEMPERATURE: 99.1 F | HEART RATE: 105 BPM | SYSTOLIC BLOOD PRESSURE: 126 MMHG | RESPIRATION RATE: 18 BRPM

## 2022-05-14 DIAGNOSIS — U07.1 COVID: Primary | ICD-10-CM

## 2022-05-14 LAB
ALBUMIN SERPL-MCNC: 4 G/DL (ref 3.5–5)
ALBUMIN/GLOB SERPL: 1.1 {RATIO} (ref 1.1–2.2)
ALP SERPL-CCNC: 70 U/L (ref 45–117)
ALT SERPL-CCNC: 58 U/L (ref 12–78)
ANION GAP SERPL CALC-SCNC: 4 MMOL/L (ref 5–15)
AST SERPL-CCNC: 46 U/L (ref 15–37)
ATRIAL RATE: 98 BPM
BASOPHILS # BLD: 0 K/UL (ref 0–0.1)
BASOPHILS NFR BLD: 0 % (ref 0–1)
BILIRUB SERPL-MCNC: 0.2 MG/DL (ref 0.2–1)
BUN SERPL-MCNC: 11 MG/DL (ref 6–20)
BUN/CREAT SERPL: 12 (ref 12–20)
CALCIUM SERPL-MCNC: 8.7 MG/DL (ref 8.5–10.1)
CALCULATED P AXIS, ECG09: 60 DEGREES
CALCULATED R AXIS, ECG10: 47 DEGREES
CALCULATED T AXIS, ECG11: 81 DEGREES
CHLORIDE SERPL-SCNC: 105 MMOL/L (ref 97–108)
CO2 SERPL-SCNC: 26 MMOL/L (ref 21–32)
COMMENT, HOLDF: NORMAL
CREAT SERPL-MCNC: 0.95 MG/DL (ref 0.55–1.02)
DIAGNOSIS, 93000: NORMAL
DIFFERENTIAL METHOD BLD: ABNORMAL
EOSINOPHIL # BLD: 0 K/UL (ref 0–0.4)
EOSINOPHIL NFR BLD: 0 % (ref 0–7)
ERYTHROCYTE [DISTWIDTH] IN BLOOD BY AUTOMATED COUNT: 13.4 % (ref 11.5–14.5)
GLOBULIN SER CALC-MCNC: 3.7 G/DL (ref 2–4)
GLUCOSE SERPL-MCNC: 139 MG/DL (ref 65–100)
HCT VFR BLD AUTO: 35.2 % (ref 35–47)
HGB BLD-MCNC: 11.6 G/DL (ref 11.5–16)
IMM GRANULOCYTES # BLD AUTO: 0 K/UL (ref 0–0.04)
IMM GRANULOCYTES NFR BLD AUTO: 0 % (ref 0–0.5)
LYMPHOCYTES # BLD: 0.5 K/UL (ref 0.8–3.5)
LYMPHOCYTES NFR BLD: 6 % (ref 12–49)
MCH RBC QN AUTO: 31.4 PG (ref 26–34)
MCHC RBC AUTO-ENTMCNC: 33 G/DL (ref 30–36.5)
MCV RBC AUTO: 95.4 FL (ref 80–99)
MONOCYTES # BLD: 0.7 K/UL (ref 0–1)
MONOCYTES NFR BLD: 8 % (ref 5–13)
NEUTS SEG # BLD: 7.7 K/UL (ref 1.8–8)
NEUTS SEG NFR BLD: 86 % (ref 32–75)
NRBC # BLD: 0 K/UL (ref 0–0.01)
NRBC BLD-RTO: 0 PER 100 WBC
P-R INTERVAL, ECG05: 130 MS
PLATELET # BLD AUTO: 190 K/UL (ref 150–400)
PMV BLD AUTO: 9.8 FL (ref 8.9–12.9)
POTASSIUM SERPL-SCNC: 3.7 MMOL/L (ref 3.5–5.1)
PROT SERPL-MCNC: 7.7 G/DL (ref 6.4–8.2)
Q-T INTERVAL, ECG07: 342 MS
QRS DURATION, ECG06: 60 MS
QTC CALCULATION (BEZET), ECG08: 436 MS
RBC # BLD AUTO: 3.69 M/UL (ref 3.8–5.2)
RBC MORPH BLD: ABNORMAL
SAMPLES BEING HELD,HOLD: NORMAL
SODIUM SERPL-SCNC: 135 MMOL/L (ref 136–145)
TROPONIN-HIGH SENSITIVITY: 5 NG/L (ref 0–51)
VENTRICULAR RATE, ECG03: 98 BPM
WBC # BLD AUTO: 8.9 K/UL (ref 3.6–11)

## 2022-05-14 PROCEDURE — 93005 ELECTROCARDIOGRAM TRACING: CPT

## 2022-05-14 PROCEDURE — 84484 ASSAY OF TROPONIN QUANT: CPT

## 2022-05-14 PROCEDURE — 71045 X-RAY EXAM CHEST 1 VIEW: CPT

## 2022-05-14 PROCEDURE — 36415 COLL VENOUS BLD VENIPUNCTURE: CPT

## 2022-05-14 PROCEDURE — 80053 COMPREHEN METABOLIC PANEL: CPT

## 2022-05-14 PROCEDURE — 85025 COMPLETE CBC W/AUTO DIFF WBC: CPT

## 2022-05-14 PROCEDURE — 99285 EMERGENCY DEPT VISIT HI MDM: CPT

## 2022-05-14 NOTE — DISCHARGE INSTRUCTIONS
Will need to stay well-hydrated. We are giving you information regarding the monoclonal antibody infusion center. Stay on soft foods or liquids and follow-up with your own physician or return if symptoms worsen or you are pulse ox drops below 92%.

## 2022-05-14 NOTE — ED NOTES
Patient discharged to home ambulatory. Pt not short of breath and verbalized understanding of discharge instructions. Pt given  Information about infusions.

## 2022-05-14 NOTE — ED TRIAGE NOTES
Pt c/o cough, chest congestion, sore throat, headache and chest pain. Patient is covid+. Sx started Thursday. Vaccinated x 2 -pfizer    Referred to ED from [facility] for chest xray.  Was given dose decadron PTA

## 2022-05-16 ENCOUNTER — PATIENT OUTREACH (OUTPATIENT)
Dept: CASE MANAGEMENT | Age: 77
End: 2022-05-16

## 2022-05-16 NOTE — PROGRESS NOTES
Patient contacted regarding COVID-19 diagnosis. Discussed COVID-19 related testing which was not done at this time. Test results were not done. Patient informed of results, if available? no.     LPN Care Coordinator contacted the patient by telephone to perform post discharge assessment. Call within 2 business days of discharge: Yes Verified name and  with patient as identifiers. Provided introduction to self, and explanation of the CTN/ACM role, and reason for call due to risk factors for infection and/or exposure to COVID-19. Symptoms reviewed with patient who verbalized the following symptoms: no new symptoms and no worsening symptoms      Due to no new or worsening symptoms encounter was not routed to provider for escalation. Discussed follow-up appointments. If no appointment was previously scheduled, appointment scheduling offered:  no. [facility] follow up appointment(s):   Future Appointments   Date Time Provider Ashkan Santoro   8/15/2022  4:75 PM MD EHSAN [facility]   2022  8:30 AM [facility]   2023 11:53 AM MD EHSAN [facility]     Non-[facility] follow up appointment(s): NA    Interventions to address risk factors: Obtained and reviewed discharge summary and/or continuity of care documents  Reviewed discharge instructions, medical action plan and red flags with patient who verbalized understanding. Advance Care Planning:   Does patient have an Advance Directive: decision makers updated. Educated patient about risk for severe COVID-19 due to risk factors according to CDC guidelines. LPN CC reviewed discharge instructions, medical action plan and red flag symptoms with the patient who verbalized understanding. Discussed COVID vaccination status: no. Education provided on COVID-19 vaccination as appropriate. Discussed exposure protocols and quarantine with CDC Guidelines.  Patient was given an opportunity to verbalize any questions and concerns and agrees to contact LPN CC or health care provider for questions related to their healthcare. Reviewed and educated patient on any new and changed medications related to discharge diagnosis     Was patient discharged with a pulse oximeter? no.    LPN CC provided contact information. No further follow-up call identified based on severity of symptoms and risk factors.

## 2022-05-19 NOTE — ED PROVIDER NOTES
This is a 61-year-old female who was diagnosed with COVID on Thursday. She has been running low-grade temperature, cough, headache and sore throat. She has not had any myalgias. She denies any nausea or vomiting. She has had some chest discomfort and tightness. She denies any severe shortness of breath, wheezes or congestion. She was seen at [facility] this evening and basically sent to the ED for further evaluation with chest x-ray and blood work. Patient does not appear ill and does not appear dyspneic. She voices no other acute complaint. There also has been no complaint of abdominal pain or diarrhea.          Past Medical History:   Diagnosis Date    Acute pancreatitis 4/10/2017    Due to pancreas divisum    breast cancer May 2010    R breast, stage IA; T1b,pN0, M0; Dr. Kaur [facility]) 2010    Eczema 10/9/2014    GERD (gastroesophageal reflux disease)     HTN (hypertension)     Hypercholesterolemia     Osteoporosis     Pancreatitis     Tubular adenoma of colon 8/21/2018       Past Surgical History:   Procedure Laterality Date    COLONOSCOPY N/A 4/27/2018    COLONOSCOPY performed by Dmitry Hernandez MD at [facility] ENDOSCOPY; tubular adenoma at cecum, hyperplastic polyp at rectum, hemorrhoids, diverticulosis, repeat in 3 yrs    COLONOSCOPY N/A 11/1/2021    COLONOSCOPY   :- performed by Stanley Lea MD at [facility] ENDOSCOPY    HX BREAST BIOPSY Right 2010    POSITIVE; CANCEROUS    HX BREAST LUMPECTOMY Right 2010    right    HX GI      hemorrhoid surgery - in office         Family History:   Problem Relation Age of Onset    Osteoporosis Mother     Heart Failure Mother     Heart Disease Mother     Colon Cancer Father     Cancer Father         lung & colon    Cancer Sister         skin - [facility]       Social History     Socioeconomic History    Marital status:      Spouse name: Not on file    Number of children: Not on file    Years of education: Not on file    Highest education level: Not on file   Occupational History    Not on file   Tobacco Use    Smoking status: Never Smoker    Smokeless tobacco: Never Used   Substance and Sexual Activity    Alcohol use: Yes     Comment: rarely    Drug use: Never    Sexual activity: Not Currently   Other Topics Concern    Not on file   Social History Narrative    Not on file     Social Determinants of Health     Financial Resource Strain:     Difficulty of Paying Living Expenses: Not on file   Food Insecurity:     Worried About Running Out of Food in the Last Year: Not on file    Marvin of Food in the Last Year: Not on file   Transportation Needs:     Lack of Transportation (Medical): Not on file    Lack of Transportation (Non-Medical): Not on file   Physical Activity:     Days of Exercise per Week: Not on file    Minutes of Exercise per Session: Not on file   Stress:     Feeling of Stress : Not on file   Social Connections:     Frequency of Communication with Friends and Family: Not on file    Frequency of Social Gatherings with Friends and Family: Not on file    Attends [employer] Services: Not on file    Active Member of [address] [place] [address] or Organizations: Not on file    Attends Club or Organization Meetings: Not on file    Marital Status: Not on file   Intimate Partner Violence:     Fear of Current or Ex-Partner: Not on file    Emotionally Abused: Not on file    Physically Abused: Not on file    Sexually Abused: Not on file   Housing Stability:     Unable to Pay for Housing in the Last Year: Not on file    Number of [place] in the Last Year: Not on file    Unstable Housing in the Last Year: Not on file         ALLERGIES: Prilosec [omeprazole] and Zinc    Review of Systems   Constitutional:  Negative for activity change, appetite change, chills, fatigue and fever. HENT:  Negative for ear pain, facial swelling, sore throat and trouble swallowing.     Eyes:  Negative for pain, discharge and visual disturbance. Respiratory:  Positive for cough. Negative for chest tightness, shortness of breath and wheezing. Cardiovascular:  Positive for chest pain. Negative for palpitations and leg swelling. Gastrointestinal:  Negative for abdominal pain, blood in stool, nausea and vomiting. Genitourinary:  Negative for difficulty urinating, flank pain and hematuria. Musculoskeletal:  Negative for arthralgias, joint swelling, myalgias and neck pain. Skin:  Negative for color change and rash. Neurological:  Negative for dizziness, weakness, numbness and headaches. Hematological:  Negative for adenopathy. Does not bruise/bleed easily. Psychiatric/Behavioral:  Negative for behavioral problems, confusion and sleep disturbance. All other systems reviewed and are negative. Vitals:    05/14/22 0937 05/14/22 1201   BP: 126/64    Pulse: (!) 105    Resp: 18    Temp: 99.1 °F (37.3 °C)    SpO2: 96% 96%            Physical Exam  Vitals and nursing note reviewed. Constitutional:       General: She is not in acute distress. Appearance: She is well-developed. She is not ill-appearing or diaphoretic. HENT:      Head: Normocephalic and atraumatic. Nose: Nose normal.   Eyes:      General: No scleral icterus. Conjunctiva/sclera: Conjunctivae normal.      Pupils: Pupils are equal, round, and reactive to light. Neck:      Thyroid: No thyromegaly. Vascular: No JVD. Trachea: No tracheal deviation. Comments: No carotid bruits noted. Cardiovascular:      Rate and Rhythm: Normal rate and regular rhythm. Heart sounds: Normal heart sounds. No murmur heard. No friction rub. No gallop. Pulmonary:      Effort: Pulmonary effort is normal. No respiratory distress. Breath sounds: Normal breath sounds. No wheezing or rales. Comments: Occasional cough  Chest:      Chest wall: No tenderness. Abdominal:      General: Bowel sounds are normal. There is no distension. Palpations: Abdomen is soft. There is no mass. Tenderness: There is no abdominal tenderness. There is no guarding or rebound. Musculoskeletal:         General: No tenderness. Normal range of motion. Cervical back: Normal range of motion and neck supple. Lymphadenopathy:      Cervical: No cervical adenopathy. Skin:     General: Skin is warm and dry. Capillary Refill: Capillary refill takes 2 to 3 seconds. Findings: No erythema or rash. Neurological:      Mental Status: She is alert and oriented to person, place, and time. Cranial Nerves: No cranial nerve deficit. Coordination: Coordination normal.      Deep Tendon Reflexes: Reflexes are normal and symmetric. Psychiatric:         Behavior: Behavior normal.         Thought Content: Thought content normal.         Judgment: Judgment normal.        MDM  Number of Diagnoses or Management Options  COVID: new and requires workup     Amount and/or Complexity of Data Reviewed  Clinical lab tests: ordered and reviewed  Tests in the radiology section of CPT®: ordered and reviewed  Decide to obtain previous medical records or to obtain history from someone other than the patient: yes  Review and summarize past medical records: yes  Independent visualization of images, tracings, or specimens: yes    Risk of Complications, Morbidity, and/or Mortality  Presenting problems: high  Diagnostic procedures: moderate  Management options: moderate    Patient Progress  Patient progress: stable         Procedures    Patient presents with a diagnosis of Covid per [facility] for a chest x ray. Patient is not having significant SOB, fever or productive cough. There is a mild headache noted. Also sore throat. No myalgias. Labs and xray are unremarkable. Pulse ox has been in the 96-97% range on RA. Patient does not appear toxic. Have discussed all findings and will be treated symptomatically.  To follow up with own MD if continued difficult or worsening of symptoms, especially SOB . She voices understanding and is DC in NAD. She was also given information regarding Monoclonal Antibiodies and administration thereof is symptoms worsen. Addendum 7/29/22  ED MD EKG interpretation: There is a sinus rhythm at 98 beats a minute. Axis is normal and intervals appear normal.  Nonspecific ST and T wave changes. No ectopy or acute ischemia noted.  Jesus Spencer MD

## 2022-06-17 ENCOUNTER — TRANSCRIBE ORDER (OUTPATIENT)
Dept: SCHEDULING | Age: 77
End: 2022-06-17

## 2022-06-17 DIAGNOSIS — Z12.31 VISIT FOR SCREENING MAMMOGRAM: Primary | ICD-10-CM

## 2022-07-29 ENCOUNTER — HOSPITAL ENCOUNTER (OUTPATIENT)
Dept: MAMMOGRAPHY | Age: 77
Discharge: HOME | End: 2022-07-29
Attending: INTERNAL MEDICINE
Payer: MEDICARE

## 2022-07-29 DIAGNOSIS — Z12.31 VISIT FOR SCREENING MAMMOGRAM: ICD-10-CM

## 2022-07-29 PROCEDURE — 77063 BREAST TOMOSYNTHESIS BI: CPT

## 2022-08-15 ENCOUNTER — OFFICE VISIT (OUTPATIENT)
Dept: OBGYN CLINIC | Age: 77
End: 2022-08-15
Payer: MEDICARE

## 2022-08-15 VITALS — WEIGHT: 129 LBS | BODY MASS INDEX: 22.85 KG/M2 | DIASTOLIC BLOOD PRESSURE: 81 MMHG | SYSTOLIC BLOOD PRESSURE: 150 MMHG

## 2022-08-15 DIAGNOSIS — Z46.89 PESSARY MAINTENANCE: Primary | ICD-10-CM

## 2022-08-15 PROCEDURE — 1123F ACP DISCUSS/DSCN MKR DOCD: CPT | Performed by: OBSTETRICS & GYNECOLOGY

## 2022-08-15 PROCEDURE — G8420 CALC BMI NORM PARAMETERS: HCPCS | Performed by: OBSTETRICS & GYNECOLOGY

## 2022-08-15 PROCEDURE — G8536 NO DOC ELDER MAL SCRN: HCPCS | Performed by: OBSTETRICS & GYNECOLOGY

## 2022-08-15 PROCEDURE — G8432 DEP SCR NOT DOC, RNG: HCPCS | Performed by: OBSTETRICS & GYNECOLOGY

## 2022-08-15 PROCEDURE — 1090F PRES/ABSN URINE INCON ASSESS: CPT | Performed by: OBSTETRICS & GYNECOLOGY

## 2022-08-15 PROCEDURE — 99212 OFFICE O/P EST SF 10 MIN: CPT | Performed by: OBSTETRICS & GYNECOLOGY

## 2022-08-15 PROCEDURE — G8753 SYS BP > OR = 140: HCPCS | Performed by: OBSTETRICS & GYNECOLOGY

## 2022-08-15 PROCEDURE — 1101F PT FALLS ASSESS-DOCD LE1/YR: CPT | Performed by: OBSTETRICS & GYNECOLOGY

## 2022-08-15 PROCEDURE — G8427 DOCREV CUR MEDS BY ELIG CLIN: HCPCS | Performed by: OBSTETRICS & GYNECOLOGY

## 2022-08-15 PROCEDURE — G8754 DIAS BP LESS 90: HCPCS | Performed by: OBSTETRICS & GYNECOLOGY

## 2022-08-15 NOTE — PROGRESS NOTES
Procedure note: Pessary follow-up    Indications:  Rowena Hough is a 69 y.o. female WHITE/NON-, No obstetric history on file. who presents for a pessary follow-up. She currently uses 3 size, ringform type of pessary to control her cystocele, rectocele, uterine, vaginal prolapse. Procedure: The patient was placed in dorsal lithotomy position. Examination confirms the previously noted defect. The pessary was removed and cleansed without difficulty. Speculum examination revealed vaginal vault with minimal ulceration. There was minimal discharge. The pessary was replaced without difficulty. The patient tolerated the procedure well. Post Procedure:  Patient was discharged to home in stable condition. Care instructions were provided.

## 2022-08-24 ENCOUNTER — TELEPHONE (OUTPATIENT)
Dept: INTERNAL MEDICINE CLINIC | Age: 77
End: 2022-08-24

## 2022-08-24 DIAGNOSIS — I10 ESSENTIAL HYPERTENSION: ICD-10-CM

## 2022-08-24 DIAGNOSIS — M81.0 OSTEOPOROSIS, UNSPECIFIED OSTEOPOROSIS TYPE, UNSPECIFIED PATHOLOGICAL FRACTURE PRESENCE: ICD-10-CM

## 2022-08-24 DIAGNOSIS — E78.2 MIXED HYPERLIPIDEMIA: Primary | ICD-10-CM

## 2022-08-24 DIAGNOSIS — E55.9 VITAMIN D DEFICIENCY: ICD-10-CM

## 2022-08-24 NOTE — TELEPHONE ENCOUNTER
----- Message from Aida Buckner sent at 8/24/2022  8:31 AM EDT -----  Subject: Referral Request    Reason for referral request? pt has an appt 9/9, and would like to get her   bloodwork done prior to visit. can someone put the orders in the chart and   then call pt to schedule her? thank you   Provider patient wants to be referred to(if known):     Provider Phone Number(if known):     Additional Information for Provider?   ---------------------------------------------------------------------------  --------------  [id number]    [phone]; OK to leave message on voicemail  ---------------------------------------------------------------------------  --------------

## 2022-08-27 LAB
25(OH)D3+25(OH)D2 SERPL-MCNC: 44.1 NG/ML (ref 30–100)
ALBUMIN SERPL-MCNC: 5 G/DL (ref 3.7–4.7)
ALBUMIN/GLOB SERPL: 2.5 {RATIO} (ref 1.2–2.2)
ALP SERPL-CCNC: 64 IU/L (ref 44–121)
ALT SERPL-CCNC: 25 IU/L (ref 0–32)
AST SERPL-CCNC: 25 IU/L (ref 0–40)
BASOPHILS # BLD AUTO: 0.1 X10E3/UL (ref 0–0.2)
BASOPHILS NFR BLD AUTO: 1 %
BILIRUB SERPL-MCNC: 0.7 MG/DL (ref 0–1.2)
BUN SERPL-MCNC: 13 MG/DL (ref 8–27)
BUN/CREAT SERPL: 14 (ref 12–28)
CALCIUM SERPL-MCNC: 9.9 MG/DL (ref 8.7–10.3)
CHLORIDE SERPL-SCNC: 105 MMOL/L (ref 96–106)
CHOLEST SERPL-MCNC: 172 MG/DL (ref 100–199)
CO2 SERPL-SCNC: 26 MMOL/L (ref 20–29)
CREAT SERPL-MCNC: 0.93 MG/DL (ref 0.57–1)
EGFR: 64 ML/MIN/1.73
EOSINOPHIL # BLD AUTO: 0.3 X10E3/UL (ref 0–0.4)
EOSINOPHIL NFR BLD AUTO: 4 %
ERYTHROCYTE [DISTWIDTH] IN BLOOD BY AUTOMATED COUNT: 12.5 % (ref 11.7–15.4)
GLOBULIN SER CALC-MCNC: 2 G/DL (ref 1.5–4.5)
GLUCOSE SERPL-MCNC: 88 MG/DL (ref 65–99)
HCT VFR BLD AUTO: 39.5 % (ref 34–46.6)
HDLC SERPL-MCNC: 87 MG/DL
HGB BLD-MCNC: 12.9 G/DL (ref 11.1–15.9)
IMM GRANULOCYTES # BLD AUTO: 0 X10E3/UL (ref 0–0.1)
IMM GRANULOCYTES NFR BLD AUTO: 0 %
LDLC SERPL CALC-MCNC: 73 MG/DL (ref 0–99)
LYMPHOCYTES # BLD AUTO: 1.9 X10E3/UL (ref 0.7–3.1)
LYMPHOCYTES NFR BLD AUTO: 28 %
MCH RBC QN AUTO: 30.5 PG (ref 26.6–33)
MCHC RBC AUTO-ENTMCNC: 32.7 G/DL (ref 31.5–35.7)
MCV RBC AUTO: 93 FL (ref 79–97)
MONOCYTES # BLD AUTO: 0.7 X10E3/UL (ref 0.1–0.9)
MONOCYTES NFR BLD AUTO: 11 %
NEUTROPHILS # BLD AUTO: 3.7 X10E3/UL (ref 1.4–7)
NEUTROPHILS NFR BLD AUTO: 56 %
PLATELET # BLD AUTO: 228 X10E3/UL (ref 150–450)
POTASSIUM SERPL-SCNC: 4.2 MMOL/L (ref 3.5–5.2)
PROT SERPL-MCNC: 7 G/DL (ref 6–8.5)
RBC # BLD AUTO: 4.23 X10E6/UL (ref 3.77–5.28)
SODIUM SERPL-SCNC: 145 MMOL/L (ref 134–144)
TRIGL SERPL-MCNC: 61 MG/DL (ref 0–149)
VLDLC SERPL CALC-MCNC: 12 MG/DL (ref 5–40)
WBC # BLD AUTO: 6.6 X10E3/UL (ref 3.4–10.8)

## 2022-09-09 ENCOUNTER — OFFICE VISIT (OUTPATIENT)
Dept: INTERNAL MEDICINE CLINIC | Age: 77
End: 2022-09-09
Payer: MEDICARE

## 2022-09-09 VITALS
HEART RATE: 71 BPM | WEIGHT: 128.6 LBS | RESPIRATION RATE: 20 BRPM | BODY MASS INDEX: 23.66 KG/M2 | OXYGEN SATURATION: 97 % | HEIGHT: 62 IN | SYSTOLIC BLOOD PRESSURE: 126 MMHG | DIASTOLIC BLOOD PRESSURE: 75 MMHG | TEMPERATURE: 98 F

## 2022-09-09 DIAGNOSIS — Z85.3 HISTORY OF BREAST CANCER: ICD-10-CM

## 2022-09-09 DIAGNOSIS — E78.2 MIXED HYPERLIPIDEMIA: ICD-10-CM

## 2022-09-09 DIAGNOSIS — M81.0 AGE-RELATED OSTEOPOROSIS WITHOUT CURRENT PATHOLOGICAL FRACTURE: ICD-10-CM

## 2022-09-09 DIAGNOSIS — I10 ESSENTIAL HYPERTENSION: ICD-10-CM

## 2022-09-09 DIAGNOSIS — Z13.31 SCREENING FOR DEPRESSION: ICD-10-CM

## 2022-09-09 DIAGNOSIS — Z00.00 MEDICARE ANNUAL WELLNESS VISIT, SUBSEQUENT: Primary | ICD-10-CM

## 2022-09-09 PROCEDURE — 1123F ACP DISCUSS/DSCN MKR DOCD: CPT | Performed by: INTERNAL MEDICINE

## 2022-09-09 PROCEDURE — G8536 NO DOC ELDER MAL SCRN: HCPCS | Performed by: INTERNAL MEDICINE

## 2022-09-09 PROCEDURE — G8754 DIAS BP LESS 90: HCPCS | Performed by: INTERNAL MEDICINE

## 2022-09-09 PROCEDURE — 1090F PRES/ABSN URINE INCON ASSESS: CPT | Performed by: INTERNAL MEDICINE

## 2022-09-09 PROCEDURE — G8420 CALC BMI NORM PARAMETERS: HCPCS | Performed by: INTERNAL MEDICINE

## 2022-09-09 PROCEDURE — G8427 DOCREV CUR MEDS BY ELIG CLIN: HCPCS | Performed by: INTERNAL MEDICINE

## 2022-09-09 PROCEDURE — 1101F PT FALLS ASSESS-DOCD LE1/YR: CPT | Performed by: INTERNAL MEDICINE

## 2022-09-09 PROCEDURE — G0439 PPPS, SUBSEQ VISIT: HCPCS | Performed by: INTERNAL MEDICINE

## 2022-09-09 PROCEDURE — G8510 SCR DEP NEG, NO PLAN REQD: HCPCS | Performed by: INTERNAL MEDICINE

## 2022-09-09 PROCEDURE — G8752 SYS BP LESS 140: HCPCS | Performed by: INTERNAL MEDICINE

## 2022-09-09 PROCEDURE — 99214 OFFICE O/P EST MOD 30 MIN: CPT | Performed by: INTERNAL MEDICINE

## 2022-09-09 RX ORDER — ATORVASTATIN CALCIUM 20 MG/1
20 TABLET, FILM COATED ORAL DAILY
Qty: 90 TABLET | Refills: 3 | Status: SHIPPED | OUTPATIENT
Start: 2022-09-09

## 2022-09-09 RX ORDER — DENOSUMAB 60 MG/ML
60 INJECTION SUBCUTANEOUS
COMMUNITY

## 2022-09-09 RX ORDER — AMLODIPINE BESYLATE 2.5 MG/1
2.5 TABLET ORAL DAILY
Qty: 90 TABLET | Refills: 3 | Status: SHIPPED | OUTPATIENT
Start: 2022-09-09

## 2022-09-09 NOTE — PROGRESS NOTES
Bria Fall  Identified pt with two pt identifiers(name and ). Chief Complaint   Patient presents with    Annual Wellness Visit    Hypertension       Reviewed record In preparation for visit and have obtained necessary documentation. 1. Have you been to the ER, urgent care clinic or hospitalized since your last visit? No     2. Have you seen or consulted any other health care providers outside of the [address] [place] [address] since your last visit? Include any pap smears or colon screening. No    Vitals reviewed with provider. Health Maintenance reviewed:     Health Maintenance Due   Topic    Depression Screen     Medicare Yearly Exam     COVID-19 Vaccine (3 - Booster for Pfizer series)    Flu Vaccine (1)          Wt Readings from Last 3 Encounters:   08/15/22 129 lb (58.5 kg)   22 129 lb (58.5 kg)   22 129 lb (58.5 kg)        Temp Readings from Last 3 Encounters:   22 99.1 °F (37.3 °C)   21 97.8 °F (36.6 °C)   20 98 °F (36.7 °C)        BP Readings from Last 3 Encounters:   08/15/22 (!) 150/81   22 126/64   22 (!) 150/90        Pulse Readings from Last 3 Encounters:   22 (!) 105   21 69   20 69      There were no vitals filed for this visit. Learning Assessment:   :       Learning Assessment 10/9/2014   PRIMARY LEARNER Patient   HIGHEST LEVEL OF EDUCATION - PRIMARY LEARNER  GRADUATED HIGH SCHOOL OR GED   BARRIERS PRIMARY LEARNER NONE   CO-LEARNER CAREGIVER No   PRIMARY LANGUAGE ENGLISH   LEARNER PREFERENCE PRIMARY LISTENING   ANSWERED BY patient   RELATIONSHIP SELF        Depression Screening:   :       3 most recent PHQ Screens 2019   Little interest or pleasure in doing things Not at all   Feeling down, depressed, irritable, or hopeless Not at all   Total Score PHQ 2 0        Fall Risk Assessment:   :       Fall Risk Assessment, last 12 mths 2019   Able to walk? Yes   Fall in past 12 months?  No        Abuse Screening:   : Abuse Screening Questionnaire 8/27/2019 8/21/2018 10/9/2014   Do you ever feel afraid of your partner? N N N   Are you in a relationship with someone who physically or mentally threatens you? N N N   Is it safe for you to go home?  Y Y Y        ADL Screening:   :       ADL Assessment 8/21/2018   Feeding yourself No Help Needed   Getting from bed to chair No Help Needed   Getting dressed No Help Needed   Bathing or showering No Help Needed   Walk across the room (includes cane/walker) No Help Needed   Using the telphone No Help Needed   Taking your medications No Help Needed   Preparing meals No Help Needed   Managing money (expenses/bills) No Help Needed   Moderately strenuous housework (laundry) No Help Needed   Shopping for personal items (toiletries/medicines) No Help Needed   Shopping for groceries No Help Needed   Driving No Help Needed   Climbing a flight of stairs No Help Needed   Getting to places beyond walking distances No Help Needed

## 2022-09-09 NOTE — PROGRESS NOTES
Will discuss at 9/9/22 appt. Na a little high at 145. Normal kidney and liver tests, blood counts, vitamin D, and cholesterol. Keep up the good work!

## 2022-09-09 NOTE — PROGRESS NOTES
This is the Subsequent Medicare Annual Wellness Exam, performed 12 months or more after the Initial AWV or the last Subsequent AWV    I have reviewed the patient's medical history in detail and updated the computerized patient record. Assessment/Plan   Education and counseling provided:  Are appropriate based on today's review and evaluation  End-of-Life planning (with patient's consent)  Influenza Vaccine    1. Medicare annual wellness visit, subsequent  2. Screening for depression  -     DEPRESSION SCREEN ANNUAL       Depression Risk Factor Screening     3 most recent PHQ Screens 4/12/2019   Little interest or pleasure in doing things Not at all   Feeling down, depressed, irritable, or hopeless Not at all   Total Score PHQ 2 0       Alcohol & Drug Abuse Risk Screen    Do you average more than 1 drink per night or more than 7 drinks a week:  No    On any one occasion in the past three months have you have had more than 3 drinks containing alcohol:  No          Functional Ability and Level of Safety    Hearing: Hearing is good. Activities of Daily Living: The home contains: no safety equipment. Patient does total self care      Ambulation: with no difficulty     Fall Risk:  Fall Risk Assessment, last 12 mths 8/27/2019   Able to walk? Yes   Fall in past 12 months?  No      Abuse Screen:  Patient is not abused       Cognitive Screening    Has your family/caregiver stated any concerns about your memory: no     Cognitive Screening: Normal recall of 3 objects after 5 mins    Health Maintenance Due     Health Maintenance Due   Topic Date Due    Depression Screen  Never done    COVID-19 Vaccine (1) Never done    Medicare Yearly Exam  08/27/2020    Shingrix Vaccine Age 50> (2 of 2) 10/20/2020    Flu Vaccine (1) 09/01/2022       Patient Care Team   Patient Care Team:  Farrah Triana MD as PCP - General (Internal Medicine Physician)  Farrah Triana MD as PCP - [facility]  Sandra Jansen MD (Hematology and Oncology)  Harsha Rousseau MD (Gastroenterology)  Chucho Cerda MD (Obstetrics & Gynecology)  Teresa Milner MD ([facility])  Calos Cage MD (Dermatology Physician)  Andre Helm MD (Rheumatology Internal Medicine)    History     Patient Active Problem List   Diagnosis Code    Malignant neoplasm of right breast, stage 1, estrogen receptor positive ([facility] [address] C50.911, Z17.0    Osteoporosis M81.0    Ocular migraine G43. 109    Diverticulosis of colon K57.30    Advance directive on file Z78.9    Mixed hyperlipidemia E78.2    Essential hypertension I10    Prolapse of female genital organs N81.9    Vitamin D deficiency E55.9     Past Medical History:   Diagnosis Date    Acute pancreatitis 4/10/2017    Due to pancreas divisum    breast cancer May 2010    R breast, stage IA; T1b,pN0, M0; Dr. Rona Watson [facility]) 2010    Eczema 10/9/2014    GERD (gastroesophageal reflux disease)     HTN (hypertension)     Hypercholesterolemia     Osteoporosis     Pancreatitis     Tubular adenoma of colon 8/21/2018      Past Surgical History:   Procedure Laterality Date    COLONOSCOPY N/A 04/27/2018    COLONOSCOPY performed by Kevin Flaherty MD at [facility] ENDOSCOPY; tubular adenoma at cecum, hyperplastic polyp at rectum, hemorrhoids, diverticulosis, repeat in 3 yrs    COLONOSCOPY N/A 11/01/2021    COLONOSCOPY   :- performed by Harsha Rousseau MD at [facility] ENDOSCOPY. 3 tubular adenomas, 1 hyperplastic polyp, diverticulosis, int hemorrhoids, repeat in 3 yrs    HX BREAST BIOPSY Right 2010    POSITIVE; CANCEROUS    HX BREAST LUMPECTOMY Right 2010    right    HX GI      hemorrhoid surgery - in office     Current Outpatient Medications   Medication Sig Dispense Refill    simethicone (GAS-X PO) Take  by mouth. Takes one po once daily.       amLODIPine (NORVASC) 2.5 mg tablet TAKE 1 TABLET BY MOUTH DAILY FOR HIGH BLOOD PRESSURE 90 Tab 3    atorvastatin (LIPITOR) 20 mg tablet Take 1 Tab by mouth daily.  Indications: high cholesterol 90 Tab 3     Allergies   Allergen Reactions    Prilosec [Omeprazole] Not Reported This Time     No longer allergic    Zinc Swelling     Jewelry       Family History   Problem Relation Age of Onset    Osteoporosis Mother     Heart Failure Mother     Heart Disease Mother     Colon Cancer Father     Cancer Father         lung & colon    Cancer Sister         skin - BCCA     Social History     Tobacco Use    Smoking status: Never    Smokeless tobacco: Never   Substance Use Topics    Alcohol use: Yes     Comment: rarely         Idalia Cordoba MD

## 2022-09-09 NOTE — PATIENT INSTRUCTIONS
Medicare Wellness Visit, Female     The best way to live healthy is to have a lifestyle where you eat a well-balanced diet, exercise regularly, limit alcohol use, and quit all forms of tobacco/nicotine, if applicable. Regular preventive services are another way to keep healthy. Preventive services (vaccines, screening tests, monitoring & exams) can help personalize your care plan, which helps you manage your own care. Screening tests can find health problems at the earliest stages, when they are easiest to treat. [facility] follows the current, evidence-based guidelines published by the [facility] Atilio Adam ([facility]) when recommending preventive services for our patients. Because we follow these guidelines, sometimes recommendations change over time as research supports it. (For example, mammograms used to be recommended annually. Even though Medicare will still pay for an annual mammogram, the newer guidelines recommend a mammogram every two years for women of average risk). Of course, you and your doctor may decide to screen more often for some diseases, based on your risk and your co-morbidities (chronic disease you are already diagnosed with). Preventive services for you include:  - Medicare offers their members a free annual wellness visit, which is time for you and your primary care provider to discuss and plan for your preventive service needs. Take advantage of this benefit every year!  -All adults over the age of 72 should receive the recommended pneumonia vaccines. Current USPSTF guidelines recommend a series of two vaccines for the best pneumonia protection.   -All adults should have a flu vaccine yearly and a tetanus vaccine every 10 years.   -All adults age 48 and older should receive the shingles vaccines (series of two vaccines).       -All adults age 38-68 who are overweight should have a diabetes screening test once every three years.   -All adults born between [birth year] and 1965 should be screened once for Hepatitis C.  -Other screening tests and preventive services for persons with diabetes include: an eye exam to screen for diabetic retinopathy, a kidney function test, a foot exam, and stricter control over your cholesterol.   -Cardiovascular screening for adults with routine risk involves an electrocardiogram (ECG) at intervals determined by your doctor.   -Colorectal cancer screenings should be done for adults age 54-65 with no increased risk factors for colorectal cancer. There are a number of acceptable methods of screening for this type of cancer. Each test has its own benefits and drawbacks. Discuss with your doctor what is most appropriate for you during your annual wellness visit. The different tests include: colonoscopy (considered the best screening method), a fecal occult blood test, a fecal DNA test, and sigmoidoscopy.    -A bone mass density test is recommended when a woman turns 65 to screen for osteoporosis. This test is only recommended one time, as a screening. Some providers will use this same test as a disease monitoring tool if you already have osteoporosis. -Breast cancer screenings are recommended every other year for women of normal risk, age 54-69.  -Cervical cancer screenings for women over age 72 are only recommended with certain risk factors.      Here is a list of your current Health Maintenance items (your personalized list of preventive services) with a due date:  Health Maintenance Due   Topic Date Due    Depresssion Screening  Never done    COVID-19 Vaccine (1) Never done    Annual Well Visit  08/27/2020    Shingles Vaccine (2 of 2) 10/20/2020    Yearly Flu Vaccine (1) 09/01/2022

## 2022-09-09 NOTE — PROGRESS NOTES
CC:  Chief Complaint   Patient presents with    Annual Wellness Visit    Hypertension       HISTORY OF PRESENT ILLNESS  Rubi Saba is a 69 y.o. female    Presents for Medicare AWV and to re-establish care. Last seen 8/27/19. She has HTN, hyperlipidemia, osteoporosis, and hx of breast cancer diagnosed in 2010 (right breast, ER/[place] positive), and eczema. No complaints. Denies HA's, CP, SOB, dizziness, heart palpitations, or leg swelling. Takes amlodipine 2.5 mg daily and atorvastatin 20 mg daily. Finished Arimidex a year ago. Started on Prolia about 2 yrs ago.] after having DEXA at [facility]. Dr. Aracelis Barahona. Soc Hx  . Has 2 living adult children (both live in [address] [facility]) and 4 grandchildren. She retired in 8/17; worked as the  for [employer]. Never smoker. Drinks alcohol infrequently since having pancreatitis in 4/17. Walks 5 miles a day for exercise. Health Maintenance   Flu vaccine: declined  COVID-19 vaccine: had initial 2 vaccines, declined booster vaccine  Pneumonia vaccine: PPSV 9/21/11, PCV-13 6/7/17  Zoster vaccine: Zostrix 10/28/11, Shingrix vaccine 8/25/20  Colonoscopy: 11/1/21, Dr. Jose Meraz, 3 tubular adenomas, repeat in 3 yrs (2021); her father had colon cancer at age 66. Mammogram: 7/29/22, benign  Eye exam: 3/21, Dr. Matt Lane, [address] [place] [address]  A complete review of systems was performed and is negative except for those mentioned in the HPI. Patient Active Problem List   Diagnosis Code    Osteoporosis M81.0    Ocular migraine G43. 109    Diverticulosis of colon K57.30    Advance directive on file Z78.9    Mixed hyperlipidemia E78.2    Essential hypertension I10    Prolapse of female genital organs N81.9    Vitamin D deficiency E55.9     Past Medical History:   Diagnosis Date    Acute pancreatitis 04/10/2017    Due to pancreas divisum    breast cancer 05/2010    R breast, stage IA; T1b,pN0, M0;  Yudelka Bars - surgery/radiation    Breast cancer [facility]) 2010    Eczema 10/09/2014    GERD (gastroesophageal reflux disease)     HTN (hypertension)     Hypercholesterolemia     Osteoarthritis (arthritis due to wear and tear of joints)     Osteoporosis     Pancreatitis     Tubular adenoma of colon 08/21/2018     Past Surgical History:   Procedure Laterality Date    COLONOSCOPY N/A 04/27/2018    COLONOSCOPY performed by Dwayne Terry MD at [facility] ENDOSCOPY; tubular adenoma at cecum, hyperplastic polyp at rectum, hemorrhoids, diverticulosis, repeat in 3 yrs    COLONOSCOPY N/A 11/01/2021    COLONOSCOPY   :- performed by Nazanin Godwin MD at [facility] ENDOSCOPY. 3 tubular adenomas, 1 hyperplastic polyp, diverticulosis, int hemorrhoids, repeat in 3 yrs    HX BREAST BIOPSY Right 2010    POSITIVE; CANCEROUS    HX BREAST LUMPECTOMY Right 2010    right    HX GI      hemorrhoid surgery - in office     Social History     Socioeconomic History    Marital status:    Tobacco Use    Smoking status: Never    Smokeless tobacco: Never   Substance and Sexual Activity    Alcohol use: Yes     Comment: rarely    Drug use: Never    Sexual activity: Not Currently     Family History   Problem Relation Age of Onset    Osteoporosis Mother     Heart Failure Mother     Heart Disease Mother     Colon Cancer Father     Cancer Father         lung & colon    Cancer Sister         skin - BCCA     Allergies   Allergen Reactions    Zinc Swelling     Jewelry     Current Outpatient Medications   Medication Sig Dispense Refill    amLODIPine (NORVASC) 2.5 mg tablet Take 1 Tablet by mouth daily. FOR HIGH BLOOD PRESSURE 90 Tablet 3    atorvastatin (LIPITOR) 20 mg tablet Take 1 Tablet by mouth daily. Indications: high cholesterol 90 Tablet 3    denosumab (Prolia) 60 mg/mL injection 60 mg by SubCUTAneous route every 6 months. folic acid/multivit-min/lutein (CENTRUM SILVER PO) Take 1 Tablet by mouth daily.  Centrum 50 Plus      simethicone (GAS-X PO) Take  by mouth. Takes one po once daily. PHYSICAL EXAM  Visit Vitals  /75 (BP 1 Location: Left upper arm, BP Patient Position: Sitting, BP Cuff Size: Adult)   Pulse 71   Temp 98 °F (36.7 °C) (Oral)   Resp 20   Ht 5' 2\" (1.575 m)   Wt 128 lb 9.6 oz (58.3 kg)   SpO2 97%   BMI 23.52 kg/m²       General: Well-developed and well-nourished, no distress. HEENT:  Head normocephalic/atraumatic, no scleral icterus  Neck: Supple. No carotid bruits, JVD, lymphadenopathy, or thyromegaly. Lungs:  Clear to auscultation bilaterally. Good air movement. Heart:  Regular rate and rhythm, normal S1 and S2, no murmur, gallop, or rub  Abdomen: Soft, non-distended, normal bowel sounds, no tenderness, no guarding, masses, rebound tenderness, or HSM. Extremities: No clubbing, cyanosis, or edema. 2+ pedal pulses. Neurological: Alert and oriented. Psychiatric: Normal mood and affect. Behavior is normal.     Results for orders placed or performed in visit on 08/24/22   LIPID PANEL   Result Value Ref Range    Cholesterol, total 172 100 - 199 mg/dL    Triglyceride 61 0 - 149 mg/dL    HDL Cholesterol 87 >39 mg/dL    VLDL, calculated 12 5 - 40 mg/dL    LDL, calculated 73 0 - 99 mg/dL   METABOLIC PANEL, COMPREHENSIVE   Result Value Ref Range    Glucose 88 65 - 99 mg/dL    BUN 13 8 - 27 mg/dL    Creatinine 0.93 0.57 - 1.00 mg/dL    eGFR 64 >59 mL/min/1.73    BUN/Creatinine ratio 14 12 - 28    Sodium 145 (H) 134 - 144 mmol/L    Potassium 4.2 3.5 - 5.2 mmol/L    Chloride 105 96 - 106 mmol/L    CO2 26 20 - 29 mmol/L    Calcium 9.9 8.7 - 10.3 mg/dL    Protein, total 7.0 6.0 - 8.5 g/dL    Albumin 5.0 (H) 3.7 - 4.7 g/dL    GLOBULIN, TOTAL 2.0 1.5 - 4.5 g/dL    A-G Ratio 2.5 (H) 1.2 - 2.2    Bilirubin, total 0.7 0.0 - 1.2 mg/dL    Alk.  phosphatase 64 44 - 121 IU/L    AST (SGOT) 25 0 - 40 IU/L    ALT (SGPT) 25 0 - 32 IU/L   VITAMIN D, 25 HYDROXY   Result Value Ref Range    VITAMIN D, 25-HYDROXY 44.1 30.0 - 100.0 ng/mL   CBC WITH AUTOMATED DIFF   Result Value Ref Range    WBC 6.6 3.4 - 10.8 x10E3/uL    RBC 4.23 3.77 - 5.28 x10E6/uL    HGB 12.9 11.1 - 15.9 g/dL    HCT 39.5 34.0 - 46.6 %    MCV 93 79 - 97 fL    MCH 30.5 26.6 - 33.0 pg    MCHC 32.7 31.5 - 35.7 g/dL    RDW 12.5 11.7 - 15.4 %    PLATELET 806 226 - 730 x10E3/uL    NEUTROPHILS 56 Not Estab. %    Lymphocytes 28 Not Estab. %    MONOCYTES 11 Not Estab. %    EOSINOPHILS 4 Not Estab. %    BASOPHILS 1 Not Estab. %    ABS. NEUTROPHILS 3.7 1.4 - 7.0 x10E3/uL    Abs Lymphocytes 1.9 0.7 - 3.1 x10E3/uL    ABS. MONOCYTES 0.7 0.1 - 0.9 x10E3/uL    ABS. EOSINOPHILS 0.3 0.0 - 0.4 x10E3/uL    ABS. BASOPHILS 0.1 0.0 - 0.2 x10E3/uL    IMMATURE GRANULOCYTES 0 Not Estab. %    ABS. IMM. GRANS. 0.0 0.0 - 0.1 x10E3/uL         ASSESSMENT AND PLAN    ICD-10-CM ICD-9-CM    1. Medicare annual wellness visit, subsequent  Z00.00 V70.0       2. Essential hypertension  I10 401.9 amLODIPine (NORVASC) 2.5 mg tablet      3. Mixed hyperlipidemia  E78.2 272.2 atorvastatin (LIPITOR) 20 mg tablet      4. Age-related osteoporosis without current pathological fracture  M81.0 733.01       5. History of breast cancer  Z85.3 V10.3       6. Screening for depression  Z13.31 V79.0 Yu Salmeron        Discussed lab results from 8/26/22. Na a little high at 145, likely due to mild dehydration. Normal kidney and liver tests, blood counts, vitamin D, and cholesterol. Keep up the good work! Diagnoses and all orders for this visit:    1. Medicare annual wellness visit, subsequent    2. Essential hypertension  -     Refill amLODIPine (NORVASC) 2.5 mg tablet; Take 1 Tablet by mouth daily. FOR HIGH BLOOD PRESSURE    3. Mixed hyperlipidemia  -     Refill atorvastatin (LIPITOR) 20 mg tablet; Take 1 Tablet by mouth daily. Indications: high cholesterol    4. Age-related osteoporosis without current pathological fracture    5. History of breast cancer    6.  Screening for depression  -     DEPRESSION SCREEN ANNUAL    Both HTN and hyperlipidemia are well-controlled. Refill amlodipine and atorvastatin. Osteoporosis managed by Dr. Leydi Fernandez. Time Spent: 35 mins on medical record review, history, exam, discussing problems and plan, counseling, and placing orders. Follow-up and Dispositions    Return in about 1 year (around 9/9/2023), or if symptoms worsen or fail to improve, for Medicare AWV with fasting labs 1 week before appointment. I have discussed the diagnosis with the patient and the intended plan as seen in the above orders. Patient is in agreement. The patient has received an after-visit summary and questions were answered concerning future plans. I have discussed medication side effects and warnings with the patient as well.

## 2022-09-09 NOTE — ACP (ADVANCE CARE PLANNING)
Advance Care Planning     Advance Care Planning (ACP) Physician/NP/PA Conversation      Date of Conversation: 9/9/2022  Conducted with: Patient with Decision Making Capacity    Healthcare Decision Maker:     Primary Decision Maker: Marquise Mcconnell - Daughter - [phone]  Click here to complete [address] including selection of the Healthcare Decision Maker Relationship (ie \"Primary\")    Care Preferences:    Hospitalization: \"If your health worsens and it becomes clear that your chance of recovery is unlikely, what would be your preference regarding hospitalization? \"  The patient would prefer hospitalization. Ventilation: \"If you were unable to breathe on your own and your chance of recovery was unlikely, what would be your preference about the use of a ventilator (breathing machine) if it was available to you? \"   The patient would NOT desire the use of a ventilator. Resuscitation: \"In the event your heart stopped as a result of an underlying serious health condition, would you want attempts to be made to restart your heart, or would you prefer a natural death? \"   Yes, attempt to resuscitate.     Additional topics discussed: treatment goals    Conversation Outcomes / Follow-Up Plan:   ACP complete - no further action today  Reviewed DNR/DNI and patient elects Full Code (Attempt Resuscitation)     Length of Voluntary ACP Conversation in minutes:  <16 minutes (Non-Billable)    Angus Pappas MD

## 2022-11-14 ENCOUNTER — OFFICE VISIT (OUTPATIENT)
Dept: OBGYN CLINIC | Age: 77
End: 2022-11-14
Payer: MEDICARE

## 2022-11-14 VITALS — BODY MASS INDEX: 23.78 KG/M2 | SYSTOLIC BLOOD PRESSURE: 155 MMHG | WEIGHT: 130 LBS | DIASTOLIC BLOOD PRESSURE: 80 MMHG

## 2022-11-14 DIAGNOSIS — Z01.419 WELL WOMAN EXAM WITH ROUTINE GYNECOLOGICAL EXAM: Primary | ICD-10-CM

## 2022-11-14 PROCEDURE — G8536 NO DOC ELDER MAL SCRN: HCPCS | Performed by: OBSTETRICS & GYNECOLOGY

## 2022-11-14 PROCEDURE — 99212 OFFICE O/P EST SF 10 MIN: CPT | Performed by: OBSTETRICS & GYNECOLOGY

## 2022-11-14 PROCEDURE — 3078F DIAST BP <80 MM HG: CPT | Performed by: OBSTETRICS & GYNECOLOGY

## 2022-11-14 PROCEDURE — 1101F PT FALLS ASSESS-DOCD LE1/YR: CPT | Performed by: OBSTETRICS & GYNECOLOGY

## 2022-11-14 PROCEDURE — 1090F PRES/ABSN URINE INCON ASSESS: CPT | Performed by: OBSTETRICS & GYNECOLOGY

## 2022-11-14 PROCEDURE — G8432 DEP SCR NOT DOC, RNG: HCPCS | Performed by: OBSTETRICS & GYNECOLOGY

## 2022-11-14 PROCEDURE — G8754 DIAS BP LESS 90: HCPCS | Performed by: OBSTETRICS & GYNECOLOGY

## 2022-11-14 PROCEDURE — 3074F SYST BP LT 130 MM HG: CPT | Performed by: OBSTETRICS & GYNECOLOGY

## 2022-11-14 PROCEDURE — G8753 SYS BP > OR = 140: HCPCS | Performed by: OBSTETRICS & GYNECOLOGY

## 2022-11-14 PROCEDURE — 1123F ACP DISCUSS/DSCN MKR DOCD: CPT | Performed by: OBSTETRICS & GYNECOLOGY

## 2022-11-14 PROCEDURE — G8427 DOCREV CUR MEDS BY ELIG CLIN: HCPCS | Performed by: OBSTETRICS & GYNECOLOGY

## 2022-11-14 PROCEDURE — G8420 CALC BMI NORM PARAMETERS: HCPCS | Performed by: OBSTETRICS & GYNECOLOGY

## 2022-11-14 NOTE — PROGRESS NOTES
Procedure note: Pessary follow-up    Indications:  Brien Long is a 69 y.o. female WHITE/NON-, No obstetric history on file. who presents for a pessary follow-up. She currently uses 3 size, ring platform type of pessary to control her cystocele, rectocele, uterine, vaginal prolapse. Procedure: The patient was placed in dorsal lithotomy position. Examination confirms the previously noted defect. The pessary was removed and cleansed without difficulty. Speculum examination revealed vaginal vault with minimal ulceration. There was minimal discharge. The pessary was replaced without difficulty. The patient tolerated the procedure well. Post Procedure:  Patient was discharged to home in stable condition. Care instructions were provided.   Fu 4m pessary check

## 2022-11-30 ENCOUNTER — HOSPITAL ENCOUNTER (EMERGENCY)
Age: 77
Discharge: LEFT BEFORE DISCHARGE | End: 2022-11-30
Payer: MEDICARE

## 2022-11-30 VITALS
OXYGEN SATURATION: 97 % | RESPIRATION RATE: 18 BRPM | TEMPERATURE: 97.6 F | DIASTOLIC BLOOD PRESSURE: 74 MMHG | SYSTOLIC BLOOD PRESSURE: 175 MMHG | HEART RATE: 73 BPM

## 2022-11-30 LAB
ALBUMIN SERPL-MCNC: 4 G/DL (ref 3.5–5)
ALBUMIN/GLOB SERPL: 1.1 {RATIO} (ref 1.1–2.2)
ALP SERPL-CCNC: 60 U/L (ref 45–117)
ALT SERPL-CCNC: 35 U/L (ref 12–78)
ANION GAP SERPL CALC-SCNC: 4 MMOL/L (ref 5–15)
AST SERPL-CCNC: 23 U/L (ref 15–37)
BASOPHILS # BLD: 0.1 K/UL (ref 0–0.1)
BASOPHILS NFR BLD: 1 % (ref 0–1)
BILIRUB SERPL-MCNC: 0.3 MG/DL (ref 0.2–1)
BUN SERPL-MCNC: 19 MG/DL (ref 6–20)
BUN/CREAT SERPL: 20 (ref 12–20)
CALCIUM SERPL-MCNC: 9.1 MG/DL (ref 8.5–10.1)
CHLORIDE SERPL-SCNC: 110 MMOL/L (ref 97–108)
CO2 SERPL-SCNC: 27 MMOL/L (ref 21–32)
COMMENT, HOLDF: NORMAL
CREAT SERPL-MCNC: 0.93 MG/DL (ref 0.55–1.02)
CRP SERPL HS-MCNC: <0.2 MG/L
DIFFERENTIAL METHOD BLD: NORMAL
EOSINOPHIL # BLD: 0.4 K/UL (ref 0–0.4)
EOSINOPHIL NFR BLD: 4 % (ref 0–7)
ERYTHROCYTE [DISTWIDTH] IN BLOOD BY AUTOMATED COUNT: 13.4 % (ref 11.5–14.5)
ERYTHROCYTE [SEDIMENTATION RATE] IN BLOOD: 14 MM/HR (ref 0–30)
GLOBULIN SER CALC-MCNC: 3.7 G/DL (ref 2–4)
GLUCOSE SERPL-MCNC: 99 MG/DL (ref 65–100)
HCT VFR BLD AUTO: 39.1 % (ref 35–47)
HGB BLD-MCNC: 12.7 G/DL (ref 11.5–16)
IMM GRANULOCYTES # BLD AUTO: 0 K/UL (ref 0–0.04)
IMM GRANULOCYTES NFR BLD AUTO: 0 % (ref 0–0.5)
LYMPHOCYTES # BLD: 2.9 K/UL (ref 0.8–3.5)
LYMPHOCYTES NFR BLD: 29 % (ref 12–49)
MCH RBC QN AUTO: 31 PG (ref 26–34)
MCHC RBC AUTO-ENTMCNC: 32.5 G/DL (ref 30–36.5)
MCV RBC AUTO: 95.4 FL (ref 80–99)
MONOCYTES # BLD: 0.9 K/UL (ref 0–1)
MONOCYTES NFR BLD: 9 % (ref 5–13)
NEUTS SEG # BLD: 5.9 K/UL (ref 1.8–8)
NEUTS SEG NFR BLD: 57 % (ref 32–75)
NRBC # BLD: 0 K/UL (ref 0–0.01)
NRBC BLD-RTO: 0 PER 100 WBC
PLATELET # BLD AUTO: 245 K/UL (ref 150–400)
PMV BLD AUTO: 9.8 FL (ref 8.9–12.9)
POTASSIUM SERPL-SCNC: 3.6 MMOL/L (ref 3.5–5.1)
PROT SERPL-MCNC: 7.7 G/DL (ref 6.4–8.2)
RBC # BLD AUTO: 4.1 M/UL (ref 3.8–5.2)
SAMPLES BEING HELD,HOLD: NORMAL
SODIUM SERPL-SCNC: 141 MMOL/L (ref 136–145)
WBC # BLD AUTO: 10.2 K/UL (ref 3.6–11)

## 2022-11-30 PROCEDURE — 75810000275 HC EMERGENCY DEPT VISIT NO LEVEL OF CARE

## 2022-11-30 PROCEDURE — 85652 RBC SED RATE AUTOMATED: CPT

## 2022-11-30 PROCEDURE — 86141 C-REACTIVE PROTEIN HS: CPT

## 2022-11-30 PROCEDURE — 85025 COMPLETE CBC W/AUTO DIFF WBC: CPT

## 2022-11-30 PROCEDURE — 36415 COLL VENOUS BLD VENIPUNCTURE: CPT

## 2022-11-30 PROCEDURE — 80053 COMPREHEN METABOLIC PANEL: CPT

## 2022-11-30 NOTE — ED NOTES
Patient requesting to leave. RN encouraged patient to stay and that she would be evaluated as soon as possible. However, patient continued to voice the desire to leave and requested IV be removed.  RN told her to return if symptoms worsened

## 2022-11-30 NOTE — ED TRIAGE NOTES
Triage: Pt arrives ambulatory from home with CC of left eye pain, left temple tenderness, and and headache. Pt reports she has had these symptoms since Friday on and off. She reports she is being followed [facility] and they are concerned to temporal arteritis and sent her to the ED.

## 2022-12-05 ENCOUNTER — OFFICE VISIT (OUTPATIENT)
Dept: INTERNAL MEDICINE CLINIC | Age: 77
End: 2022-12-05
Payer: MEDICARE

## 2022-12-05 VITALS
OXYGEN SATURATION: 95 % | DIASTOLIC BLOOD PRESSURE: 79 MMHG | WEIGHT: 133 LBS | HEIGHT: 62 IN | TEMPERATURE: 98.2 F | BODY MASS INDEX: 24.48 KG/M2 | HEART RATE: 73 BPM | RESPIRATION RATE: 16 BRPM | SYSTOLIC BLOOD PRESSURE: 136 MMHG

## 2022-12-05 DIAGNOSIS — R51.9 TENDERNESS OF TEMPLE REGION: ICD-10-CM

## 2022-12-05 DIAGNOSIS — I10 ESSENTIAL HYPERTENSION: Primary | ICD-10-CM

## 2022-12-05 PROCEDURE — G8754 DIAS BP LESS 90: HCPCS | Performed by: INTERNAL MEDICINE

## 2022-12-05 PROCEDURE — 3074F SYST BP LT 130 MM HG: CPT | Performed by: INTERNAL MEDICINE

## 2022-12-05 PROCEDURE — 1090F PRES/ABSN URINE INCON ASSESS: CPT | Performed by: INTERNAL MEDICINE

## 2022-12-05 PROCEDURE — 99214 OFFICE O/P EST MOD 30 MIN: CPT | Performed by: INTERNAL MEDICINE

## 2022-12-05 PROCEDURE — G8427 DOCREV CUR MEDS BY ELIG CLIN: HCPCS | Performed by: INTERNAL MEDICINE

## 2022-12-05 PROCEDURE — G8510 SCR DEP NEG, NO PLAN REQD: HCPCS | Performed by: INTERNAL MEDICINE

## 2022-12-05 PROCEDURE — G8536 NO DOC ELDER MAL SCRN: HCPCS | Performed by: INTERNAL MEDICINE

## 2022-12-05 PROCEDURE — 1101F PT FALLS ASSESS-DOCD LE1/YR: CPT | Performed by: INTERNAL MEDICINE

## 2022-12-05 PROCEDURE — G8420 CALC BMI NORM PARAMETERS: HCPCS | Performed by: INTERNAL MEDICINE

## 2022-12-05 PROCEDURE — 3078F DIAST BP <80 MM HG: CPT | Performed by: INTERNAL MEDICINE

## 2022-12-05 PROCEDURE — G8752 SYS BP LESS 140: HCPCS | Performed by: INTERNAL MEDICINE

## 2022-12-05 PROCEDURE — 1123F ACP DISCUSS/DSCN MKR DOCD: CPT | Performed by: INTERNAL MEDICINE

## 2022-12-05 RX ORDER — MINOXIDIL 5 %
SOLUTION, NON-ORAL TOPICAL DAILY
COMMUNITY

## 2022-12-05 NOTE — PROGRESS NOTES
Franco Newell  Identified pt with two pt identifiers(name and ). Chief Complaint   Patient presents with    Hypertension       Reviewed record In preparation for visit and have obtained necessary documentation. 1. Have you been to the ER, urgent care clinic or hospitalized since your last visit? No     2. Have you seen or consulted any other health care providers outside of the [address] [place] [address] since your last visit? Include any pap smears or colon screening. No    Vitals reviewed with provider. Health Maintenance reviewed: There are no preventive care reminders to display for this patient. Wt Readings from Last 3 Encounters:   22 130 lb (59 kg)   22 128 lb 9.6 oz (58.3 kg)   08/15/22 129 lb (58.5 kg)        Temp Readings from Last 3 Encounters:   22 98 °F (36.7 °C) (Oral)   22 99.1 °F (37.3 °C)   21 97.8 °F (36.6 °C)        BP Readings from Last 3 Encounters:   22 (!) 155/80   22 126/75   08/15/22 (!) 150/81        Pulse Readings from Last 3 Encounters:   22 71   22 (!) 105   21 69      There were no vitals filed for this visit. Learning Assessment:   :       Learning Assessment 10/9/2014   PRIMARY LEARNER Patient   HIGHEST LEVEL OF EDUCATION - PRIMARY LEARNER  GRADUATED HIGH SCHOOL OR GED   BARRIERS PRIMARY LEARNER NONE   CO-LEARNER CAREGIVER No   PRIMARY LANGUAGE ENGLISH   LEARNER PREFERENCE PRIMARY LISTENING   ANSWERED BY patient   RELATIONSHIP SELF        Depression Screening:   :       3 most recent PHQ Screens 2022   Little interest or pleasure in doing things Not at all   Feeling down, depressed, irritable, or hopeless Not at all   Total Score PHQ 2 0        Fall Risk Assessment:   :       Fall Risk Assessment, last 12 mths 2022   Able to walk? Yes   Fall in past 12 months? 0   Do you feel unsteady?  0   Are you worried about falling 0        Abuse Screening:   :       Abuse Screening Questionnaire 9/9/2022 8/27/2019 8/21/2018 10/9/2014   Do you ever feel afraid of your partner? N N N N   Are you in a relationship with someone who physically or mentally threatens you? N N N N   Is it safe for you to go home?  Y Y Y Y        ADL Screening:   :       ADL Assessment 9/9/2022   Feeding yourself No Help Needed   Getting from bed to chair No Help Needed   Getting dressed No Help Needed   Bathing or showering No Help Needed   Walk across the room (includes cane/walker) No Help Needed   Using the telphone No Help Needed   Taking your medications No Help Needed   Preparing meals No Help Needed   Managing money (expenses/bills) No Help Needed   Moderately strenuous housework (laundry) No Help Needed   Shopping for personal items (toiletries/medicines) No Help Needed   Shopping for groceries No Help Needed   Driving No Help Needed   Climbing a flight of stairs No Help Needed   Getting to places beyond walking distances No Help Needed

## 2022-12-05 NOTE — PROGRESS NOTES
CC:   Chief Complaint   Patient presents with    Hypertension       HISTORY OF PRESENT ILLNESS  Brien Long is a 69 y.o. female. Presents for evaluation of HTN. BP high at 155/80 when she saw Dr. Princess Saenz on 11/14/22. Also high when seen at [facility] ED on 11/30/22 for left temple tenderness; was 175/74. Her ESR and CRP were normal, making temporal arteritis unlikely. Reports her mother had temporal arteritis. Had increased light sensitivity at right eye and headache. Saw Dr. Naida Guevara at [facility]; eye exam normal. He recommended she to ED when she developed left temple tenderness on 11/30. Over past few months having sudden sharp pains at various areas of scalp that last for a few mins then go away. Saw Dr. Connie Smart (Neurology) about head pains and muscle fasciculations in 2017-8. CT head, EMG/NCV normal.       Patient Active Problem List   Diagnosis Code    Osteoporosis M81.0    Ocular migraine G43. 109    Diverticulosis of colon K57.30    Advance directive on file Z78.9    Mixed hyperlipidemia E78.2    Essential hypertension I10    Prolapse of female genital organs N81.9    Vitamin D deficiency E55.9     Past Medical History:   Diagnosis Date    Acute pancreatitis 04/10/2017    Due to pancreas divisum    breast cancer 05/2010    R breast, stage IA; T1b,pN0, M0; Dr. [facility]) 2010    Eczema 10/09/2014    GERD (gastroesophageal reflux disease)     HTN (hypertension)     Hypercholesterolemia     Osteoarthritis (arthritis due to wear and tear of joints)     Osteoporosis     Pancreatitis     Tubular adenoma of colon 08/21/2018     Allergies   Allergen Reactions    Zinc Swelling     Jewelry       Current Outpatient Medications   Medication Sig Dispense Refill    BIOTIN PO Take  by mouth. TURMERIC PO Take  by mouth. CRANBERRY PO Take  by mouth. amLODIPine (NORVASC) 2.5 mg tablet Take 1 Tablet by mouth daily.  FOR HIGH BLOOD PRESSURE 90 Tablet 3 atorvastatin (LIPITOR) 20 mg tablet Take 1 Tablet by mouth daily. Indications: high cholesterol 90 Tablet 3    denosumab (Prolia) 60 mg/mL injection 60 mg by SubCUTAneous route every 6 months. folic acid/multivit-min/lutein (CENTRUM SILVER PO) Take 1 Tablet by mouth daily. Centrum 50 Plus      simethicone (GAS-X PO) Take  by mouth. Takes one po once daily. PHYSICAL EXAM  Visit Vitals  /79 (BP 1 Location: Left upper arm, BP Patient Position: Sitting, BP Cuff Size: Adult)   Pulse 73   Temp 98.2 °F (36.8 °C) (Oral)   Resp 16   Ht 5' 2\" (1.575 m)   Wt 133 lb (60.3 kg)   SpO2 95%   BMI 24.33 kg/m²       General: Well-developed and well-nourished, no distress. HEENT:  Head normocephalic/atraumatic, no scleral icterus. No temporal artery tenderness bilaterally. Lungs:  Clear to ausculation bilaterally. Good air movement. Heart:  Regular rate and rhythm, normal S1 and S2, no murmur, gallop, or rub  Extremities: No clubbing, cyanosis, or edema. Neurological: Alert and oriented. Psychiatric: Normal mood and affect. Behavior is normal.         ASSESSMENT AND PLAN    ICD-10-CM ICD-9-CM    1. Essential hypertension  I10 401.9       2. Tenderness of temple region  R51.9 784.0         Diagnoses and all orders for this visit:    1. Essential hypertension  BP normal today. Continue amlodipine 2.5 mg daily. Have daughter check her BP manually 1-2 times a week over next month. Let me know if usually >140/90.    2. Tenderness of temple region  Resolved. No evidence of temporal arteritis by labs. Follow-up and Dispositions    Return if symptoms worsen or fail to improve, for Scheduled appointment on 9/11/23. I have discussed the diagnosis with the patient and the intended plan as seen in the above orders. Patient is in agreement. The patient has received an after-visit summary and questions were answered concerning future plans.   I have discussed medication side effects and warnings with the patient as well.

## 2023-02-21 ENCOUNTER — OFFICE VISIT (OUTPATIENT)
Dept: OBGYN CLINIC | Age: 78
End: 2023-02-21
Payer: MEDICARE

## 2023-02-21 VITALS — BODY MASS INDEX: 24.69 KG/M2 | SYSTOLIC BLOOD PRESSURE: 159 MMHG | WEIGHT: 135 LBS | DIASTOLIC BLOOD PRESSURE: 72 MMHG

## 2023-02-21 DIAGNOSIS — Z46.89 PESSARY MAINTENANCE: Primary | ICD-10-CM

## 2023-02-21 PROCEDURE — 1090F PRES/ABSN URINE INCON ASSESS: CPT | Performed by: OBSTETRICS & GYNECOLOGY

## 2023-02-21 PROCEDURE — 3077F SYST BP >= 140 MM HG: CPT | Performed by: OBSTETRICS & GYNECOLOGY

## 2023-02-21 PROCEDURE — 1123F ACP DISCUSS/DSCN MKR DOCD: CPT | Performed by: OBSTETRICS & GYNECOLOGY

## 2023-02-21 PROCEDURE — G8432 DEP SCR NOT DOC, RNG: HCPCS | Performed by: OBSTETRICS & GYNECOLOGY

## 2023-02-21 PROCEDURE — 1101F PT FALLS ASSESS-DOCD LE1/YR: CPT | Performed by: OBSTETRICS & GYNECOLOGY

## 2023-02-21 PROCEDURE — G8420 CALC BMI NORM PARAMETERS: HCPCS | Performed by: OBSTETRICS & GYNECOLOGY

## 2023-02-21 PROCEDURE — G8536 NO DOC ELDER MAL SCRN: HCPCS | Performed by: OBSTETRICS & GYNECOLOGY

## 2023-02-21 PROCEDURE — 99212 OFFICE O/P EST SF 10 MIN: CPT | Performed by: OBSTETRICS & GYNECOLOGY

## 2023-02-21 PROCEDURE — 3078F DIAST BP <80 MM HG: CPT | Performed by: OBSTETRICS & GYNECOLOGY

## 2023-02-21 PROCEDURE — G8427 DOCREV CUR MEDS BY ELIG CLIN: HCPCS | Performed by: OBSTETRICS & GYNECOLOGY

## 2023-02-21 NOTE — PROGRESS NOTES
Procedure note: Pessary follow-up    Indications:  Katty Barrett is a 69 y.o. female WHITE/NON-, No obstetric history on file. who presents for a pessary follow-up. She currently uses 3 size, ringform type of pessary to control her cystocele, rectocele, uterine, vaginal prolapse. Reports scant yellow discharge    Procedure: The patient was placed in dorsal lithotomy position. Examination confirms the previously noted defect. The pessary was removed and cleansed without difficulty. Speculum examination revealed vaginal vault with no ulceration. There was minimal discharge. The pessary was replaced without difficulty. The patient tolerated the procedure well. Post Procedure:  Patient was discharged to home in stable condition. Care instructions were provided.

## 2023-05-23 ENCOUNTER — OFFICE VISIT (OUTPATIENT)
Age: 78
End: 2023-05-23
Payer: MEDICARE

## 2023-05-23 VITALS
HEIGHT: 62 IN | DIASTOLIC BLOOD PRESSURE: 88 MMHG | BODY MASS INDEX: 24.29 KG/M2 | SYSTOLIC BLOOD PRESSURE: 150 MMHG | WEIGHT: 132 LBS

## 2023-05-23 DIAGNOSIS — Z46.89 PESSARY MAINTENANCE: Primary | ICD-10-CM

## 2023-05-23 PROCEDURE — G8427 DOCREV CUR MEDS BY ELIG CLIN: HCPCS | Performed by: OBSTETRICS & GYNECOLOGY

## 2023-05-23 PROCEDURE — 99212 OFFICE O/P EST SF 10 MIN: CPT | Performed by: OBSTETRICS & GYNECOLOGY

## 2023-05-23 PROCEDURE — 1090F PRES/ABSN URINE INCON ASSESS: CPT | Performed by: OBSTETRICS & GYNECOLOGY

## 2023-05-23 PROCEDURE — 3078F DIAST BP <80 MM HG: CPT | Performed by: OBSTETRICS & GYNECOLOGY

## 2023-05-23 PROCEDURE — 1036F TOBACCO NON-USER: CPT | Performed by: OBSTETRICS & GYNECOLOGY

## 2023-05-23 PROCEDURE — 1123F ACP DISCUSS/DSCN MKR DOCD: CPT | Performed by: OBSTETRICS & GYNECOLOGY

## 2023-05-23 PROCEDURE — G8400 PT W/DXA NO RESULTS DOC: HCPCS | Performed by: OBSTETRICS & GYNECOLOGY

## 2023-05-23 PROCEDURE — 3074F SYST BP LT 130 MM HG: CPT | Performed by: OBSTETRICS & GYNECOLOGY

## 2023-05-23 PROCEDURE — G8420 CALC BMI NORM PARAMETERS: HCPCS | Performed by: OBSTETRICS & GYNECOLOGY

## 2023-05-23 NOTE — PROGRESS NOTES
Procedure note: Pessary follow-up    Indications:  Ronit Feng is a 69 y.o. female WHITE/NON-, No obstetric history on file. who presents for a pessary follow-up. She currently uses 3 size, ringform type of pessary to control her cystocele, rectocele, uterine, vaginal prolapse. Reports scant yellow discharge    Procedure: The patient was placed in dorsal lithotomy position. Examination confirms the previously noted defect. The pessary was removed and cleansed without difficulty. Speculum examination revealed vaginal vault with no ulceration. There was minimal discharge. The pessary was replaced without difficulty. The patient tolerated the procedure well. Post Procedure:  Patient was discharged to home in stable condition. Care instructions were provided.

## 2023-06-07 RX ORDER — AMLODIPINE BESYLATE 2.5 MG/1
TABLET ORAL
Qty: 90 TABLET | Refills: 3 | Status: SHIPPED | OUTPATIENT
Start: 2023-06-07

## 2023-06-07 RX ORDER — ATORVASTATIN CALCIUM 20 MG/1
TABLET, FILM COATED ORAL
Qty: 90 TABLET | Refills: 3 | Status: SHIPPED | OUTPATIENT
Start: 2023-06-07

## 2023-06-07 NOTE — TELEPHONE ENCOUNTER
PCP: Anne Marie Evans MD     Last appt:  12/5/2022      Future Appointments   Date Time Provider Bryan Choi   9/15/2023  9:10 AM Anne Marie Evans MD [facility]   7/74/5777 97:45 AM Manuel Brady MD [facility] AMB          Requested Prescriptions     Pending Prescriptions Disp Refills    atorvastatin (LIPITOR) 20 MG tablet [Pharmacy Med Name: Atorvastatin Calcium 20 MG Oral Tablet] 90 tablet 3     Sig: TAKE 1 TABLET BY MOUTH  DAILY FOR HIGH CHOLESTEROL    amLODIPine (NORVASC) 2.5 MG tablet [Pharmacy Med Name: amLODIPine Besylate 2.5 MG Oral Tablet] 90 tablet 3     Sig: TAKE 1 TABLET BY MOUTH  DAILY FOR HIGH BLOOD  PRESSURE

## 2023-07-12 ENCOUNTER — TRANSCRIBE ORDERS (OUTPATIENT)
Facility: HOSPITAL | Age: 78
End: 2023-07-12

## 2023-07-12 DIAGNOSIS — Z12.31 SCREENING MAMMOGRAM FOR HIGH-RISK PATIENT: Primary | ICD-10-CM

## 2023-07-28 ENCOUNTER — APPOINTMENT (OUTPATIENT)
Facility: HOSPITAL | Age: 78
End: 2023-07-28
Payer: MEDICARE

## 2023-07-28 ENCOUNTER — HOSPITAL ENCOUNTER (EMERGENCY)
Facility: HOSPITAL | Age: 78
Discharge: HOME | End: 2023-07-28
Attending: STUDENT IN AN ORGANIZED HEALTH CARE EDUCATION/TRAINING PROGRAM
Payer: MEDICARE

## 2023-07-28 VITALS
HEART RATE: 70 BPM | HEIGHT: 62 IN | RESPIRATION RATE: 18 BRPM | WEIGHT: 131.39 LBS | BODY MASS INDEX: 24.18 KG/M2 | TEMPERATURE: 98.5 F | OXYGEN SATURATION: 97 % | SYSTOLIC BLOOD PRESSURE: 150 MMHG | DIASTOLIC BLOOD PRESSURE: 73 MMHG

## 2023-07-28 DIAGNOSIS — N30.00 ACUTE CYSTITIS WITHOUT HEMATURIA: ICD-10-CM

## 2023-07-28 DIAGNOSIS — R05.1 ACUTE COUGH: ICD-10-CM

## 2023-07-28 DIAGNOSIS — R10.11 ABDOMINAL PAIN, RIGHT UPPER QUADRANT: Primary | ICD-10-CM

## 2023-07-28 LAB
ALBUMIN SERPL-MCNC: 3.9 G/DL (ref 3.5–5)
ALBUMIN/GLOB SERPL: 1.3 (ref 1.1–2.2)
ALP SERPL-CCNC: 59 U/L (ref 45–117)
ALT SERPL-CCNC: 25 U/L (ref 12–78)
ANION GAP SERPL CALC-SCNC: 6 MMOL/L (ref 5–15)
APPEARANCE UR: CLEAR
AST SERPL-CCNC: 21 U/L (ref 15–37)
BACTERIA URNS QL MICRO: NEGATIVE /HPF
BASOPHILS # BLD: 0.1 K/UL (ref 0–0.1)
BASOPHILS NFR BLD: 1 % (ref 0–1)
BILIRUB SERPL-MCNC: 0.4 MG/DL (ref 0.2–1)
BILIRUB UR QL: NEGATIVE
BUN SERPL-MCNC: 22 MG/DL (ref 6–20)
BUN/CREAT SERPL: 23 (ref 12–20)
CALCIUM SERPL-MCNC: 9.4 MG/DL (ref 8.5–10.1)
CHLORIDE SERPL-SCNC: 109 MMOL/L (ref 97–108)
CO2 SERPL-SCNC: 27 MMOL/L (ref 21–32)
COLOR UR: ABNORMAL
CREAT SERPL-MCNC: 0.96 MG/DL (ref 0.55–1.02)
DIFFERENTIAL METHOD BLD: NORMAL
EKG ATRIAL RATE: 74 BPM
EKG DIAGNOSIS: NORMAL
EKG P AXIS: 47 DEGREES
EKG P-R INTERVAL: 160 MS
EKG Q-T INTERVAL: 392 MS
EKG QRS DURATION: 74 MS
EKG QTC CALCULATION (BAZETT): 435 MS
EKG R AXIS: -14 DEGREES
EKG T AXIS: 30 DEGREES
EKG VENTRICULAR RATE: 74 BPM
EOSINOPHIL # BLD: 0.4 K/UL (ref 0–0.4)
EOSINOPHIL NFR BLD: 5 % (ref 0–7)
EPITH CASTS URNS QL MICRO: ABNORMAL /LPF
ERYTHROCYTE [DISTWIDTH] IN BLOOD BY AUTOMATED COUNT: 12.6 % (ref 11.5–14.5)
GLOBULIN SER CALC-MCNC: 3 G/DL (ref 2–4)
GLUCOSE SERPL-MCNC: 108 MG/DL (ref 65–100)
GLUCOSE UR STRIP.AUTO-MCNC: NEGATIVE MG/DL
HCT VFR BLD AUTO: 36.6 % (ref 35–47)
HGB BLD-MCNC: 11.9 G/DL (ref 11.5–16)
HGB UR QL STRIP: NEGATIVE
HYALINE CASTS URNS QL MICRO: ABNORMAL /LPF (ref 0–5)
IMM GRANULOCYTES # BLD AUTO: 0 K/UL (ref 0–0.04)
IMM GRANULOCYTES NFR BLD AUTO: 0 % (ref 0–0.5)
KETONES UR QL STRIP.AUTO: NEGATIVE MG/DL
LEUKOCYTE ESTERASE UR QL STRIP.AUTO: ABNORMAL
LIPASE SERPL-CCNC: 230 U/L (ref 73–393)
LYMPHOCYTES # BLD: 2.2 K/UL (ref 0.8–3.5)
LYMPHOCYTES NFR BLD: 25 % (ref 12–49)
MCH RBC QN AUTO: 30.5 PG (ref 26–34)
MCHC RBC AUTO-ENTMCNC: 32.5 G/DL (ref 30–36.5)
MCV RBC AUTO: 93.8 FL (ref 80–99)
MONOCYTES # BLD: 0.9 K/UL (ref 0–1)
MONOCYTES NFR BLD: 10 % (ref 5–13)
NEUTS SEG # BLD: 5 K/UL (ref 1.8–8)
NEUTS SEG NFR BLD: 59 % (ref 32–75)
NITRITE UR QL STRIP.AUTO: NEGATIVE
NRBC # BLD: 0 K/UL (ref 0–0.01)
NRBC BLD-RTO: 0 PER 100 WBC
PH UR STRIP: 7.5 (ref 5–8)
PLATELET # BLD AUTO: 217 K/UL (ref 150–400)
PMV BLD AUTO: 10.3 FL (ref 8.9–12.9)
POTASSIUM SERPL-SCNC: 4.2 MMOL/L (ref 3.5–5.1)
PROT SERPL-MCNC: 6.9 G/DL (ref 6.4–8.2)
PROT UR STRIP-MCNC: NEGATIVE MG/DL
RBC # BLD AUTO: 3.9 M/UL (ref 3.8–5.2)
RBC #/AREA URNS HPF: ABNORMAL /HPF (ref 0–5)
SODIUM SERPL-SCNC: 142 MMOL/L (ref 136–145)
SP GR UR REFRACTOMETRY: 1.02 (ref 1–1.03)
SPECIMEN HOLD: NORMAL
TROPONIN I SERPL HS-MCNC: 4 NG/L (ref 0–51)
UROBILINOGEN UR QL STRIP.AUTO: 1 EU/DL (ref 0.2–1)
WBC # BLD AUTO: 8.5 K/UL (ref 3.6–11)
WBC URNS QL MICRO: ABNORMAL /HPF (ref 0–4)

## 2023-07-28 PROCEDURE — 85025 COMPLETE CBC W/AUTO DIFF WBC: CPT

## 2023-07-28 PROCEDURE — 81001 URINALYSIS AUTO W/SCOPE: CPT

## 2023-07-28 PROCEDURE — 83690 ASSAY OF LIPASE: CPT

## 2023-07-28 PROCEDURE — 99285 EMERGENCY DEPT VISIT HI MDM: CPT

## 2023-07-28 PROCEDURE — 6360000004 HC RX CONTRAST MEDICATION: Performed by: RADIOLOGY

## 2023-07-28 PROCEDURE — 87086 URINE CULTURE/COLONY COUNT: CPT

## 2023-07-28 PROCEDURE — 71046 X-RAY EXAM CHEST 2 VIEWS: CPT

## 2023-07-28 PROCEDURE — 36415 COLL VENOUS BLD VENIPUNCTURE: CPT

## 2023-07-28 PROCEDURE — 74177 CT ABD & PELVIS W/CONTRAST: CPT

## 2023-07-28 PROCEDURE — 76705 ECHO EXAM OF ABDOMEN: CPT

## 2023-07-28 PROCEDURE — 84484 ASSAY OF TROPONIN QUANT: CPT

## 2023-07-28 PROCEDURE — 80053 COMPREHEN METABOLIC PANEL: CPT

## 2023-07-28 RX ORDER — CEPHALEXIN 500 MG/1
500 CAPSULE ORAL 2 TIMES DAILY
Qty: 14 CAPSULE | Refills: 0 | Status: SHIPPED | OUTPATIENT
Start: 2023-07-28 | End: 2023-08-04

## 2023-07-28 RX ADMIN — IOPAMIDOL 100 ML: 755 INJECTION, SOLUTION INTRAVENOUS at 18:58

## 2023-07-28 ASSESSMENT — ENCOUNTER SYMPTOMS
COUGH: 1
BACK PAIN: 0
VOMITING: 0
ABDOMINAL PAIN: 1
NAUSEA: 0
SHORTNESS OF BREATH: 0

## 2023-07-28 ASSESSMENT — PAIN SCALES - GENERAL: PAINLEVEL_OUTOF10: 0

## 2023-07-28 ASSESSMENT — PAIN - FUNCTIONAL ASSESSMENT: PAIN_FUNCTIONAL_ASSESSMENT: NONE - DENIES PAIN

## 2023-07-28 NOTE — ED PROVIDER NOTES
[facility] EMERGENCY [facility]      Pt Name: Karine Reyes  MRN: [id number]  [address] 1945  Date of evaluation: 7/28/2023  Provider: CASSI Gerard - DAVID    CHIEF COMPLAINT     RUQ Pain        HISTORY OF PRESENT ILLNESS   (Location/Symptom, Timing/Onset, Context/Setting, Quality, Duration, Modifying Factors, Severity)  Note limiting factors. Patient is a 60-year-old female with past medical history of hypertension, hyperlipidemia, GERD, pancreatitis, arthritis presenting to the emergency department for evaluation of right upper quadrant abdominal pain. Patient notes that approximately 3 days ago she developed a cough. No associated shortness of breath or chest pain. Yesterday noticed right upper quadrant abdominal pain that is worse when she touches it. Additionally reports that her abdomen feels swollen and her bra is tight. Denies associated fevers, nausea, vomiting. Went to urgent care, who referred her here for concern over her gallbladder. The history is provided by the patient. Review of External Medical Records:     Nursing Notes were reviewed. REVIEW OF SYSTEMS    (2-9 systems for level 4, 10 or more for level 5)     Review of Systems   Constitutional:  Negative for unexpected weight change. HENT:  Negative for congestion. Eyes:  Negative for visual disturbance. Respiratory:  Positive for cough. Negative for shortness of breath. Cardiovascular:  Negative for chest pain and palpitations. Gastrointestinal:  Positive for abdominal pain. Negative for nausea and vomiting. Endocrine: Negative for polyuria. Genitourinary:  Negative for dysuria and flank pain. Musculoskeletal:  Negative for back pain. Skin:  Negative for pallor. Allergic/Immunologic: Negative for immunocompromised state. Neurological:  Negative for dizziness and headaches. Hematological:  Negative for adenopathy. Psychiatric/Behavioral:  Negative for agitation.

## 2023-07-28 NOTE — DISCHARGE INSTRUCTIONS
Your work-up today was without evidence of gallstones or a gallbladder infection. Your labs were otherwise very stable. You incidentally did have evidence of a urinary tract infection. I am placing you on antibiotics for this. Please follow-up with your primary care. It is possible that your pain could be musculoskeletal in nature from coughing. You may take anti-inflammatories at home as needed.

## 2023-07-28 NOTE — ED TRIAGE NOTES
Patient here for 3 days of having a cough. Last night noticed right upper abdominal pain and states she feels swollen and like her bra is so tight now it might pop. No nausea or vomiting.

## 2023-07-29 LAB
BACTERIA SPEC CULT: NORMAL
CC UR VC: NORMAL
SERVICE CMNT-IMP: NORMAL

## 2023-07-31 LAB
EKG ATRIAL RATE: 74 BPM
EKG DIAGNOSIS: NORMAL
EKG P AXIS: 47 DEGREES
EKG P-R INTERVAL: 160 MS
EKG Q-T INTERVAL: 392 MS
EKG QRS DURATION: 74 MS
EKG QTC CALCULATION (BAZETT): 435 MS
EKG R AXIS: -14 DEGREES
EKG T AXIS: 30 DEGREES
EKG VENTRICULAR RATE: 74 BPM

## 2023-08-17 ENCOUNTER — TELEPHONE (OUTPATIENT)
Age: 78
End: 2023-08-17

## 2023-08-17 NOTE — TELEPHONE ENCOUNTER
Patient calling name and  verified. Patient stating she has seen a urologist her states he thinks it is the pessary and he has sent you a message directly she has an appointment for 2023 she is asking if she needs to come sooner.     Please advise thank you

## 2023-08-21 ENCOUNTER — HOSPITAL ENCOUNTER (OUTPATIENT)
Facility: HOSPITAL | Age: 78
Discharge: HOME | End: 2023-08-24
Payer: MEDICARE

## 2023-08-21 ENCOUNTER — OFFICE VISIT (OUTPATIENT)
Age: 78
End: 2023-08-21
Payer: MEDICARE

## 2023-08-21 VITALS — WEIGHT: 126 LBS | DIASTOLIC BLOOD PRESSURE: 62 MMHG | BODY MASS INDEX: 23.05 KG/M2 | SYSTOLIC BLOOD PRESSURE: 138 MMHG

## 2023-08-21 DIAGNOSIS — Z12.31 SCREENING MAMMOGRAM FOR HIGH-RISK PATIENT: ICD-10-CM

## 2023-08-21 DIAGNOSIS — N39.0 URINARY TRACT INFECTION WITHOUT HEMATURIA, SITE UNSPECIFIED: ICD-10-CM

## 2023-08-21 DIAGNOSIS — Z46.89 ENCOUNTER FOR FITTING AND ADJUSTMENT OF PESSARY: Primary | ICD-10-CM

## 2023-08-21 PROCEDURE — 1036F TOBACCO NON-USER: CPT | Performed by: OBSTETRICS & GYNECOLOGY

## 2023-08-21 PROCEDURE — 3075F SYST BP GE 130 - 139MM HG: CPT | Performed by: OBSTETRICS & GYNECOLOGY

## 2023-08-21 PROCEDURE — 3078F DIAST BP <80 MM HG: CPT | Performed by: OBSTETRICS & GYNECOLOGY

## 2023-08-21 PROCEDURE — 1090F PRES/ABSN URINE INCON ASSESS: CPT | Performed by: OBSTETRICS & GYNECOLOGY

## 2023-08-21 PROCEDURE — 99213 OFFICE O/P EST LOW 20 MIN: CPT | Performed by: OBSTETRICS & GYNECOLOGY

## 2023-08-21 PROCEDURE — G8400 PT W/DXA NO RESULTS DOC: HCPCS | Performed by: OBSTETRICS & GYNECOLOGY

## 2023-08-21 PROCEDURE — G8420 CALC BMI NORM PARAMETERS: HCPCS | Performed by: OBSTETRICS & GYNECOLOGY

## 2023-08-21 PROCEDURE — G8427 DOCREV CUR MEDS BY ELIG CLIN: HCPCS | Performed by: OBSTETRICS & GYNECOLOGY

## 2023-08-21 PROCEDURE — 77063 BREAST TOMOSYNTHESIS BI: CPT

## 2023-08-21 PROCEDURE — 1123F ACP DISCUSS/DSCN MKR DOCD: CPT | Performed by: OBSTETRICS & GYNECOLOGY

## 2023-08-21 NOTE — PROGRESS NOTES
EXAMINATION    Constitutional  Appearance: well-nourished, well developed, alert, in no acute distress    HENT  Head and Face: appears normal         Gastrointestinal  Abdominal Examination: abdomen non-tender to palpation, normal bowel sounds, no masses present  Liver and spleen: no hepatomegaly present, spleen not palpable  Hernias: no hernias identified    Genitourinary  External Genitalia: normal appearance for age, no discharge present, no tenderness present, no inflammatory lesions present, no masses present, atrophy present  Vagina: normal vaginal vault  2nd cystocele no discharge present, no inflammatory lesions present, no masses present; pessary removed and cleansed  Bladder: non-tender to palpation  Urethra: appears normal  Cervix: normal   Uterus: normal size, shape and consistency  Adnexa: no adnexal tenderness present, no adnexal masses present  Perineum: perineum within normal limits, no evidence of trauma, no rashes or skin lesions present  Anus: anus within normal limits, no hemorrhoids present  Inguinal Lymph Nodes: no lymphadenopathy present    Skin  General Inspection: no rash, no lesions identified    Neurologic/Psychiatric  Mental Status:  Orientation: grossly oriented to person, place and time  Mood and Affect: mood normal, affect appropriate    Assessment:    Cystocele  Recurrent UTI  Hx breast cancer    Plan:   Reviewed management options with pt  Will trial having pessary out; lubricant for attempt at intercourse reviewed; pt to update if any further concerns  Patient declines presence of chaperone during today's visit.

## 2023-09-08 ENCOUNTER — TELEPHONE (OUTPATIENT)
Facility: CLINIC | Age: 78
End: 2023-09-08

## 2023-09-08 DIAGNOSIS — I10 ESSENTIAL HYPERTENSION: ICD-10-CM

## 2023-09-08 DIAGNOSIS — E78.2 MIXED HYPERLIPIDEMIA: ICD-10-CM

## 2023-09-08 DIAGNOSIS — I10 ESSENTIAL HYPERTENSION: Primary | ICD-10-CM

## 2023-09-08 DIAGNOSIS — E55.9 VITAMIN D DEFICIENCY: ICD-10-CM

## 2023-09-08 NOTE — TELEPHONE ENCOUNTER
Patient has an appt 9/15/2023 and woulk like lab orders put in and wanted to pick them up.     Please give her a call

## 2023-09-13 LAB
25(OH)D3+25(OH)D2 SERPL-MCNC: 34 NG/ML (ref 30–100)
ALBUMIN SERPL-MCNC: 4.7 G/DL (ref 3.8–4.8)
ALBUMIN/GLOB SERPL: 2.4 {RATIO} (ref 1.2–2.2)
ALP SERPL-CCNC: 70 IU/L (ref 44–121)
ALT SERPL-CCNC: 14 IU/L (ref 0–32)
AST SERPL-CCNC: 17 IU/L (ref 0–40)
BASOPHILS # BLD AUTO: 0.1 X10E3/UL (ref 0–0.2)
BASOPHILS NFR BLD AUTO: 1 %
BILIRUB SERPL-MCNC: 0.5 MG/DL (ref 0–1.2)
BUN SERPL-MCNC: 15 MG/DL (ref 8–27)
BUN/CREAT SERPL: 16 (ref 12–28)
CALCIUM SERPL-MCNC: 9.6 MG/DL (ref 8.7–10.3)
CHLORIDE SERPL-SCNC: 106 MMOL/L (ref 96–106)
CHOLEST SERPL-MCNC: 184 MG/DL (ref 100–199)
CO2 SERPL-SCNC: 23 MMOL/L (ref 20–29)
CREAT SERPL-MCNC: 0.94 MG/DL (ref 0.57–1)
EGFRCR SERPLBLD CKD-EPI 2021: 62 ML/MIN/1.73
EOSINOPHIL # BLD AUTO: 0.4 X10E3/UL (ref 0–0.4)
EOSINOPHIL NFR BLD AUTO: 6 %
ERYTHROCYTE [DISTWIDTH] IN BLOOD BY AUTOMATED COUNT: 12.4 % (ref 11.7–15.4)
GLOBULIN SER CALC-MCNC: 2 G/DL (ref 1.5–4.5)
GLUCOSE SERPL-MCNC: 94 MG/DL (ref 70–99)
HCT VFR BLD AUTO: 39 % (ref 34–46.6)
HDLC SERPL-MCNC: 88 MG/DL
HGB BLD-MCNC: 12.8 G/DL (ref 11.1–15.9)
IMM GRANULOCYTES # BLD AUTO: 0 X10E3/UL (ref 0–0.1)
IMM GRANULOCYTES NFR BLD AUTO: 0 %
LDLC SERPL CALC-MCNC: 82 MG/DL (ref 0–99)
LYMPHOCYTES # BLD AUTO: 2.2 X10E3/UL (ref 0.7–3.1)
LYMPHOCYTES NFR BLD AUTO: 30 %
MCH RBC QN AUTO: 31.3 PG (ref 26.6–33)
MCHC RBC AUTO-ENTMCNC: 32.8 G/DL (ref 31.5–35.7)
MCV RBC AUTO: 95 FL (ref 79–97)
MONOCYTES # BLD AUTO: 0.8 X10E3/UL (ref 0.1–0.9)
MONOCYTES NFR BLD AUTO: 11 %
NEUTROPHILS # BLD AUTO: 3.7 X10E3/UL (ref 1.4–7)
NEUTROPHILS NFR BLD AUTO: 52 %
PLATELET # BLD AUTO: 269 X10E3/UL (ref 150–450)
POTASSIUM SERPL-SCNC: 4.6 MMOL/L (ref 3.5–5.2)
PROT SERPL-MCNC: 6.7 G/DL (ref 6–8.5)
RBC # BLD AUTO: 4.09 X10E6/UL (ref 3.77–5.28)
SODIUM SERPL-SCNC: 143 MMOL/L (ref 134–144)
TRIGL SERPL-MCNC: 77 MG/DL (ref 0–149)
VLDLC SERPL CALC-MCNC: 14 MG/DL (ref 5–40)
WBC # BLD AUTO: 7.1 X10E3/UL (ref 3.4–10.8)

## 2023-09-15 ENCOUNTER — OFFICE VISIT (OUTPATIENT)
Facility: CLINIC | Age: 78
End: 2023-09-15

## 2023-09-15 VITALS
BODY MASS INDEX: 23.55 KG/M2 | HEART RATE: 64 BPM | SYSTOLIC BLOOD PRESSURE: 160 MMHG | WEIGHT: 128 LBS | RESPIRATION RATE: 16 BRPM | OXYGEN SATURATION: 95 % | DIASTOLIC BLOOD PRESSURE: 80 MMHG | HEIGHT: 62 IN | TEMPERATURE: 98.1 F

## 2023-09-15 DIAGNOSIS — G89.29 CHRONIC UPPER BACK PAIN: ICD-10-CM

## 2023-09-15 DIAGNOSIS — E78.2 MIXED HYPERLIPIDEMIA: ICD-10-CM

## 2023-09-15 DIAGNOSIS — M54.9 CHRONIC UPPER BACK PAIN: ICD-10-CM

## 2023-09-15 DIAGNOSIS — Z00.00 MEDICARE ANNUAL WELLNESS VISIT, SUBSEQUENT: Primary | ICD-10-CM

## 2023-09-15 DIAGNOSIS — K21.9 GASTROESOPHAGEAL REFLUX DISEASE WITHOUT ESOPHAGITIS: ICD-10-CM

## 2023-09-15 DIAGNOSIS — Z85.3 HISTORY OF BREAST CANCER: ICD-10-CM

## 2023-09-15 DIAGNOSIS — M81.0 AGE-RELATED OSTEOPOROSIS WITHOUT CURRENT PATHOLOGICAL FRACTURE: ICD-10-CM

## 2023-09-15 DIAGNOSIS — I10 ESSENTIAL HYPERTENSION: ICD-10-CM

## 2023-09-15 PROBLEM — N81.9 PROLAPSE OF FEMALE GENITAL ORGANS: Status: RESOLVED | Noted: 2020-01-27 | Resolved: 2023-09-15

## 2023-09-15 RX ORDER — UBIDECARENONE 100 MG
CAPSULE ORAL
COMMUNITY
Start: 2023-09-13

## 2023-09-15 RX ORDER — SIMETHICONE 40MG/0.6ML
SUSPENSION, DROPS(FINAL DOSAGE FORM)(ML) ORAL
COMMUNITY
Start: 2023-07-11

## 2023-09-15 RX ORDER — ANASTROZOLE 1 MG/1
TABLET ORAL
COMMUNITY
Start: 2012-02-16 | End: 2023-09-15

## 2023-09-15 RX ORDER — BENZONATATE 100 MG/1
CAPSULE ORAL
COMMUNITY
Start: 2023-09-11

## 2023-09-15 SDOH — ECONOMIC STABILITY: INCOME INSECURITY: HOW HARD IS IT FOR YOU TO PAY FOR THE VERY BASICS LIKE FOOD, HOUSING, MEDICAL CARE, AND HEATING?: NOT HARD AT ALL

## 2023-09-15 SDOH — HEALTH STABILITY: PHYSICAL HEALTH: ON AVERAGE, HOW MANY DAYS PER WEEK DO YOU ENGAGE IN MODERATE TO STRENUOUS EXERCISE (LIKE A BRISK WALK)?: 5 DAYS

## 2023-09-15 SDOH — ECONOMIC STABILITY: HOUSING INSECURITY
IN THE LAST 12 MONTHS, WAS THERE A TIME WHEN YOU DID NOT HAVE A STEADY PLACE TO SLEEP OR SLEPT IN A SHELTER (INCLUDING NOW)?: NO

## 2023-09-15 SDOH — HEALTH STABILITY: PHYSICAL HEALTH: ON AVERAGE, HOW MANY MINUTES DO YOU ENGAGE IN EXERCISE AT THIS LEVEL?: 60 MIN

## 2023-09-15 SDOH — ECONOMIC STABILITY: FOOD INSECURITY: WITHIN THE PAST 12 MONTHS, YOU WORRIED THAT YOUR FOOD WOULD RUN OUT BEFORE YOU GOT MONEY TO BUY MORE.: NEVER TRUE

## 2023-09-15 SDOH — ECONOMIC STABILITY: TRANSPORTATION INSECURITY
IN THE PAST 12 MONTHS, HAS LACK OF TRANSPORTATION KEPT YOU FROM MEETINGS, WORK, OR FROM GETTING THINGS NEEDED FOR DAILY LIVING?: NO

## 2023-09-15 SDOH — ECONOMIC STABILITY: FOOD INSECURITY: WITHIN THE PAST 12 MONTHS, THE FOOD YOU BOUGHT JUST DIDN'T LAST AND YOU DIDN'T HAVE MONEY TO GET MORE.: NEVER TRUE

## 2023-09-15 ASSESSMENT — PATIENT HEALTH QUESTIONNAIRE - PHQ9
SUM OF ALL RESPONSES TO PHQ QUESTIONS 1-9: 0
SUM OF ALL RESPONSES TO PHQ9 QUESTIONS 1 & 2: 0
SUM OF ALL RESPONSES TO PHQ QUESTIONS 1-9: 0
1. LITTLE INTEREST OR PLEASURE IN DOING THINGS: 0
SUM OF ALL RESPONSES TO PHQ QUESTIONS 1-9: 0
SUM OF ALL RESPONSES TO PHQ QUESTIONS 1-9: 0
2. FEELING DOWN, DEPRESSED OR HOPELESS: 0

## 2023-09-15 ASSESSMENT — LIFESTYLE VARIABLES
HOW OFTEN DO YOU HAVE SIX OR MORE DRINKS ON ONE OCCASION: 1
HOW MANY STANDARD DRINKS CONTAINING ALCOHOL DO YOU HAVE ON A TYPICAL DAY: 1
HOW OFTEN DO YOU HAVE A DRINK CONTAINING ALCOHOL: 2-4 TIMES A MONTH
HOW MANY STANDARD DRINKS CONTAINING ALCOHOL DO YOU HAVE ON A TYPICAL DAY: 1 OR 2
HOW MANY STANDARD DRINKS CONTAINING ALCOHOL DO YOU HAVE ON A TYPICAL DAY: 1 OR 2

## 2023-09-15 NOTE — PROGRESS NOTES
Medicare Annual Wellness Visit    Tristen Grey is here for Medicare AWV (3B)    Assessment & Plan     Medicare annual wellness visit, subsequent  Essential hypertension  Mixed hyperlipidemia  Chronic upper back pain  -     External Referral To Physical Therapy  History of breast cancer  Age-related osteoporosis without current pathological fracture  Gastroesophageal reflux disease without esophagitis    Discussed lab results from 9/12/23. Normal kidney and liver tests, blood counts, cholesterol (tot chol 184, LDL 82), and vitamin D.    - BP elevated. Reports BP good at home but cannot recall numbers. Recommended she check home BP 3 times a week, bring record and home BP monitor to next appointment. Currently takes amlodipine 2.5 mg daily. Plan to increase dose as needed at next appointment.  - HLD controlled. Continue atorvastatin 20 mg nightly. - Referral to PT for upper back exercises +/- dry needling for pain. - On Prolia prescribed by Dr. Elton March. Recommendations for Preventive Services Due: see orders and patient instructions/AVS.  Recommended screening schedule for the next 5-10 years is provided to the patient in written form: see Patient Instructions/AVS.       Return in about 2 months (around 11/15/2023), or if symptoms worsen or fail to improve, for HTN; bring home BP monitor to clinic and record of home BP. Subjective     Presents for Medicare AWV. She has HTN, hyperlipidemia, osteoporosis, and hx of breast cancer diagnosed in 2010 (right breast, ER/MI positive). Complains of pain at upper back for past 2 years that has recently been worsening in frequency and intensity. Now occurring every day. Achy, tight pain at upper back, starts at one shoulder blade and spreads across upper back. Can occur when cooking dinner or practicing piano. Lying down on back and doing sit-ups helps ease pain. Uses heating pad for up to 2 hrs sometimes before pain relief. Denies injury or trauma.  Would like

## 2023-09-15 NOTE — PATIENT INSTRUCTIONS
[employer] Aging online. You need 0974-6307 mg of calcium and 6382-1026 IU of vitamin D per day. It is possible to meet your calcium requirement with diet alone, but a vitamin D supplement is usually necessary to meet this goal.  When exposed to the sun, use a sunscreen that protects against both UVA and UVB radiation with an SPF of 30 or greater. Reapply every 2 to 3 hours or after sweating, drying off with a towel, or swimming. Always wear a seat belt when traveling in a car. Always wear a helmet when riding a bicycle or motorcycle.

## 2023-09-16 NOTE — ACP (ADVANCE CARE PLANNING)
Advance Care Planning     Advance Care Planning (ACP) Physician/NP/PA (Provider) Conversation      Date of ACP Conversation: 9/15/2023    Conversation Conducted with:   Patient with Decision Making [facility] [employer] Maker:    Current Designated Health Care Decision Maker:    Primary Decision Maker: Sara Moyer - Ling - [phone]    Note: Assess and validate information in current ACP documents, as indicated. Care Preferences:    Hospitalization: \"If your health worsens and it becomes clear that your chance of recovery is unlikely, what would your preference be regarding hospitalization? \"  If the patient would want hospitalization, answer \"yes\". If the patient would prefer comfort-focused treatment without hospitalization, answer \"no\". YES/NO/WILD CARDS: yes      Ventilation: \"If you were in your present state of health and suddenly became very ill and were unable to breathe on your own, what would your preference be about the use of a ventilator (breathing machine) if it was available to you? \"    If patient would desire the use of a ventilator (breathing machine), answer \"yes\", if not answer \"no\":yes    \"If your health worsens and it becomes clear that your chance of recovery is unlikely, what would your preference be about the use of a ventilator (breathing machine) if it was available to you? \"   no    Resuscitation:  \"CPR works best to restart the heart when there is a sudden event, like a heart attack, in someone who is otherwise healthy. Unfortunately, CPR does not typically restart the heart for people who have serious health conditions or who are very sick. \"    \"In the event your heart stopped as a result of an underlying serious health condition, would you want attempts to be made to restart your heart (answer \"yes\" for attempt to resuscitate) or would you prefer a natural death (answer \"no\" for do not attempt to resuscitate)? \"   yes     NOTE: If the patient has a valid advance Education provided on the pain management plan of care/Side effects of pain management treatment/Activities of daily living, including home environment that might     exacerbate pain or reduce effectiveness of the pain management plan of care as well as strategies to address these issues/Safe use, storage and disposal of opioids when prescribed

## 2023-09-29 ENCOUNTER — TRANSCRIBE ORDERS (OUTPATIENT)
Facility: HOSPITAL | Age: 78
End: 2023-09-29

## 2023-09-29 DIAGNOSIS — Z78.0 ASYMPTOMATIC MENOPAUSAL STATE: ICD-10-CM

## 2023-09-29 DIAGNOSIS — M85.80 OTHER SPECIFIED DISORDERS OF BONE DENSITY AND STRUCTURE, UNSPECIFIED SITE: ICD-10-CM

## 2023-09-29 DIAGNOSIS — Z13.820 SCREENING FOR OSTEOPOROSIS: ICD-10-CM

## 2023-09-29 DIAGNOSIS — C50.211 MALIGNANT NEOPLASM OF UPPER-INNER QUADRANT OF RIGHT FEMALE BREAST, UNSPECIFIED ESTROGEN RECEPTOR STATUS (HCC): Primary | ICD-10-CM

## 2023-10-13 ENCOUNTER — HOSPITAL ENCOUNTER (OUTPATIENT)
Facility: HOSPITAL | Age: 78
End: 2023-10-13
Attending: INTERNAL MEDICINE
Payer: MEDICARE

## 2023-10-13 VITALS — HEIGHT: 62 IN | BODY MASS INDEX: 23.74 KG/M2 | WEIGHT: 129 LBS

## 2023-10-13 DIAGNOSIS — Z78.0 ASYMPTOMATIC MENOPAUSAL STATE: ICD-10-CM

## 2023-10-13 DIAGNOSIS — Z13.820 SCREENING FOR OSTEOPOROSIS: ICD-10-CM

## 2023-10-13 DIAGNOSIS — M85.80 OTHER SPECIFIED DISORDERS OF BONE DENSITY AND STRUCTURE, UNSPECIFIED SITE: ICD-10-CM

## 2023-10-13 DIAGNOSIS — C50.211 MALIGNANT NEOPLASM OF UPPER-INNER QUADRANT OF RIGHT FEMALE BREAST, UNSPECIFIED ESTROGEN RECEPTOR STATUS (HCC): ICD-10-CM

## 2023-10-13 PROCEDURE — 77080 DXA BONE DENSITY AXIAL: CPT

## 2023-11-08 ENCOUNTER — OFFICE VISIT (OUTPATIENT)
Age: 78
End: 2023-11-08
Payer: MEDICARE

## 2023-11-08 ENCOUNTER — TELEPHONE (OUTPATIENT)
Age: 78
End: 2023-11-08

## 2023-11-08 VITALS — SYSTOLIC BLOOD PRESSURE: 142 MMHG | BODY MASS INDEX: 23.59 KG/M2 | DIASTOLIC BLOOD PRESSURE: 82 MMHG | WEIGHT: 129 LBS

## 2023-11-08 DIAGNOSIS — N81.4 CYSTOCELE WITH PROLAPSE: Primary | ICD-10-CM

## 2023-11-08 PROCEDURE — 1123F ACP DISCUSS/DSCN MKR DOCD: CPT | Performed by: OBSTETRICS & GYNECOLOGY

## 2023-11-08 PROCEDURE — 1090F PRES/ABSN URINE INCON ASSESS: CPT | Performed by: OBSTETRICS & GYNECOLOGY

## 2023-11-08 PROCEDURE — 3077F SYST BP >= 140 MM HG: CPT | Performed by: OBSTETRICS & GYNECOLOGY

## 2023-11-08 PROCEDURE — G8420 CALC BMI NORM PARAMETERS: HCPCS | Performed by: OBSTETRICS & GYNECOLOGY

## 2023-11-08 PROCEDURE — G8427 DOCREV CUR MEDS BY ELIG CLIN: HCPCS | Performed by: OBSTETRICS & GYNECOLOGY

## 2023-11-08 PROCEDURE — G8399 PT W/DXA RESULTS DOCUMENT: HCPCS | Performed by: OBSTETRICS & GYNECOLOGY

## 2023-11-08 PROCEDURE — G8484 FLU IMMUNIZE NO ADMIN: HCPCS | Performed by: OBSTETRICS & GYNECOLOGY

## 2023-11-08 PROCEDURE — 1036F TOBACCO NON-USER: CPT | Performed by: OBSTETRICS & GYNECOLOGY

## 2023-11-08 PROCEDURE — 99213 OFFICE O/P EST LOW 20 MIN: CPT | Performed by: OBSTETRICS & GYNECOLOGY

## 2023-11-08 PROCEDURE — 3079F DIAST BP 80-89 MM HG: CPT | Performed by: OBSTETRICS & GYNECOLOGY

## 2023-11-08 NOTE — TELEPHONE ENCOUNTER
I have called and LM for the pt informing her that we have to push back her appt originally scheduled for this afternoon @ 1:00. I am asking if it is okay for her to be seen @ 3:15 instead. Awaiting her call back.

## 2023-11-08 NOTE — PROGRESS NOTES
General problem visit    Lisa Dominique is a 69 y.o. female who complains of possible bladder prolapse. She was in the office last month due to recurrent UTI's; negative urol evaluation; recommended that pessary come out. Has no further urinary infections but now again feeling cystocele and pelvic pressure. Concerned because she is going on a trip on Tuesday. Her relevant past medical history:   Past Medical History:   Diagnosis Date    Acute pancreatitis 04/10/2017    Due to pancreas divisum    Breast cancer ([facility]) 2010    Cancer ([address]) 05/2010    R breast, stage IA; T1b,pN0, M0; Dr. Yady Adames - surgery/radiation    Eczema 10/09/2014    GERD (gastroesophageal reflux disease)     History of therapeutic radiation     HTN (hypertension)     Hypercholesterolemia     Osteoarthritis (arthritis due to wear and tear of joints)     Osteoporosis     Pancreatitis     Prolapse of female genital organs 1/27/2020    Dr. Isidro [facility]). 1/23/20: Pessary placed. Tubular adenoma of colon 08/21/2018        Past Surgical History:   Procedure Laterality Date    BREAST BIOPSY Right 2010    POSITIVE; CANCEROUS    BREAST LUMPECTOMY Right 2010    right    COLONOSCOPY N/A 11/01/2021    COLONOSCOPY   :- performed by Camilo Rodriguez MD at [facility] ENDOSCOPY. 3 tubular adenomas, 1 hyperplastic polyp, diverticulosis, int hemorrhoids, repeat in 3 yrs    COLONOSCOPY N/A 04/27/2018    COLONOSCOPY performed by Camilo Rodriguez MD at [facility] ENDOSCOPY; tubular adenoma at cecum, hyperplastic polyp at rectum, hemorrhoids, diverticulosis, repeat in 3 yrs    GI      hemorrhoid surgery - in office     Social History     Occupational History    Not on file   Tobacco Use    Smoking status: Never    Smokeless tobacco: Never   Vaping Use    Vaping Use: Never used   Substance and Sexual Activity    Alcohol use:  Yes     Alcohol/week: 2.0 standard drinks of alcohol     Types: 1 Glasses of wine, 1 Cans of beer per week    Drug

## 2023-11-09 ENCOUNTER — TELEPHONE (OUTPATIENT)
Age: 78
End: 2023-11-09

## 2023-11-09 NOTE — TELEPHONE ENCOUNTER
Call made to pt, she is unable to push it in and keep it in. She would like to come into the office to have it removed. Dr. Maria Fernanda Powers is not in the office tomorrow and she has been placed on her schedule for Monday morning.

## 2023-11-09 NOTE — TELEPHONE ENCOUNTER
Patient called in, name and  verified. Patient is currently c/o the pessary that was placed yesterday is coming out when she is using the bathroom. She doesn't know if you want her to remove it herself (pt reports she does not have much functionality in her with arm) and just go on her trip without anything or should she come back in for another pessary.

## 2023-11-10 ENCOUNTER — TELEPHONE (OUTPATIENT)
Age: 78
End: 2023-11-10

## 2023-11-10 NOTE — TELEPHONE ENCOUNTER
Dougie Ordaz pt. Patient called in, name and  verified. Patient is calling in to report that she was able to get the pessary out herself and does not need the appointment for Monday. However she reports that upon removing the pessary there were large amounts of yellow discharge. She is concerned of another bladder infection. She is supposed to leave for her cruise on Tuesday and wants to know if it is possible to have an abx sent in just incase she were to come down with a bladder infection while on her cruise. She will be gone for 22 days.

## 2023-11-10 NOTE — TELEPHONE ENCOUNTER
I have called and spoken to the pt, she will keep her appt on Monday morning and be seen for UA and yellow discharge.

## 2023-11-13 ENCOUNTER — OFFICE VISIT (OUTPATIENT)
Age: 78
End: 2023-11-13
Payer: MEDICARE

## 2023-11-13 VITALS — DIASTOLIC BLOOD PRESSURE: 78 MMHG | BODY MASS INDEX: 23.05 KG/M2 | WEIGHT: 126 LBS | SYSTOLIC BLOOD PRESSURE: 126 MMHG

## 2023-11-13 DIAGNOSIS — N81.4 CYSTOCELE WITH PROLAPSE: Primary | ICD-10-CM

## 2023-11-13 PROCEDURE — 1090F PRES/ABSN URINE INCON ASSESS: CPT | Performed by: OBSTETRICS & GYNECOLOGY

## 2023-11-13 PROCEDURE — G8420 CALC BMI NORM PARAMETERS: HCPCS | Performed by: OBSTETRICS & GYNECOLOGY

## 2023-11-13 PROCEDURE — G8484 FLU IMMUNIZE NO ADMIN: HCPCS | Performed by: OBSTETRICS & GYNECOLOGY

## 2023-11-13 PROCEDURE — 3074F SYST BP LT 130 MM HG: CPT | Performed by: OBSTETRICS & GYNECOLOGY

## 2023-11-13 PROCEDURE — G8399 PT W/DXA RESULTS DOCUMENT: HCPCS | Performed by: OBSTETRICS & GYNECOLOGY

## 2023-11-13 PROCEDURE — G8428 CUR MEDS NOT DOCUMENT: HCPCS | Performed by: OBSTETRICS & GYNECOLOGY

## 2023-11-13 PROCEDURE — 1036F TOBACCO NON-USER: CPT | Performed by: OBSTETRICS & GYNECOLOGY

## 2023-11-13 PROCEDURE — 3078F DIAST BP <80 MM HG: CPT | Performed by: OBSTETRICS & GYNECOLOGY

## 2023-11-13 PROCEDURE — 99213 OFFICE O/P EST LOW 20 MIN: CPT | Performed by: OBSTETRICS & GYNECOLOGY

## 2023-11-13 PROCEDURE — 1123F ACP DISCUSS/DSCN MKR DOCD: CPT | Performed by: OBSTETRICS & GYNECOLOGY

## 2023-11-13 RX ORDER — ESTRADIOL 0.1 MG/G
CREAM VAGINAL
Qty: 42.5 G | Refills: 3 | Status: SHIPPED | OUTPATIENT
Start: 2023-11-13

## 2023-11-13 RX ORDER — AMOXICILLIN AND CLAVULANATE POTASSIUM 875; 125 MG/1; MG/1
TABLET, FILM COATED ORAL
COMMUNITY
Start: 2023-11-12

## 2023-11-13 NOTE — PROGRESS NOTES
Smoking status: Never    Smokeless tobacco: Never   Vaping Use    Vaping Use: Never used   Substance and Sexual Activity    Alcohol use: Yes     Alcohol/week: 2.0 standard drinks of alcohol     Types: 1 Glasses of wine, 1 Cans of beer per week    Drug use: Never    Sexual activity: Yes     Partners: Male     Family History   Problem Relation Age of Onset    Cancer Sister         skin - BCCA    Cancer Father         lung & colon    Colon Cancer Father     Heart Disease Mother     Osteoporosis Mother     Heart Failure Mother        Allergies   Allergen Reactions    Zinc Swelling     Jewelry     Prior to Admission medications    Medication Sig Start Date End Date Taking?  Authorizing Provider   amoxicillin-clavulanate (AUGMENTIN) 875-125 MG per tablet  11/12/23  Yes Jose G Phan MD   Multiple Vitamins-Minerals (CENTRUM SILVER 50+WOMEN PO)  9/13/23  Yes Jose G Phan MD   vitamin D (D3-1000) 25 MCG (1000 UT) CAPS  9/13/23  Yes Jose G Phan MD   MYLANTA MAXIMUM STRENGTH 400-400-40 MG/5ML SUSP TAKE 5 ML BY MOUTH THREE TIMES DAILY FOR 30 DAYS 7/11/23  Yes Jose G Phan MD   atorvastatin (LIPITOR) 20 MG tablet TAKE 1 TABLET BY MOUTH  DAILY FOR HIGH CHOLESTEROL 6/7/23  Yes Azeem Rosa MD   amLODIPine (NORVASC) 2.5 MG tablet TAKE 1 TABLET BY MOUTH  DAILY FOR HIGH BLOOD  PRESSURE 6/7/23  Yes Azeem Rosa MD   BIOTIN PO Take by mouth   Yes Automatic Reconciliation, Ar   CRANBERRY PO Take by mouth   Yes Automatic Reconciliation, Ar   denosumab (PROLIA) 60 MG/ML SOSY SC injection Inject into the skin   Yes Automatic Reconciliation, Ar   MINOXIDIL, TOPICAL, 5 % SOLN Apply topically daily   Yes Automatic Reconciliation, Ar        Review of Systems - History obtained from the patient  Constitutional: negative for weight loss, fever, night sweats  HEENT: negative for hearing loss, earache, congestion, snoring, sorethroat  CV: negative for chest pain, palpitations, edema  Resp: negative for

## 2023-12-25 PROBLEM — N81.10 BLADDER PROLAPSE, FEMALE, ACQUIRED: Status: ACTIVE | Noted: 2023-12-25

## 2023-12-25 PROBLEM — N95.2 ATROPHIC VAGINITIS: Status: ACTIVE | Noted: 2023-12-25

## 2023-12-25 PROBLEM — N81.9 PROLAPSE OF FEMALE GENITAL ORGANS: Status: ACTIVE | Noted: 2023-12-25

## 2024-01-25 ENCOUNTER — HOSPITAL ENCOUNTER (OUTPATIENT)
Facility: HOSPITAL | Age: 79
Discharge: HOME | End: 2024-01-25
Attending: ORTHOPAEDIC SURGERY
Payer: MEDICARE

## 2024-01-25 DIAGNOSIS — M54.50 LUMBAR SPINE PAIN: ICD-10-CM

## 2024-01-25 DIAGNOSIS — M62.830 BACK MUSCLE SPASM: ICD-10-CM

## 2024-01-25 DIAGNOSIS — M43.16 SPONDYLOLISTHESIS OF LUMBAR REGION: ICD-10-CM

## 2024-01-25 DIAGNOSIS — M47.816 FACET ARTHROPATHY, LUMBAR: ICD-10-CM

## 2024-01-25 DIAGNOSIS — M54.16 LUMBAR RADICULOPATHY: ICD-10-CM

## 2024-01-25 PROCEDURE — 72148 MRI LUMBAR SPINE W/O DYE: CPT

## 2024-02-15 ENCOUNTER — HOSPITAL ENCOUNTER (OUTPATIENT)
Facility: HOSPITAL | Age: 79
Discharge: HOME | End: 2024-02-15
Payer: MEDICARE

## 2024-02-15 VITALS
DIASTOLIC BLOOD PRESSURE: 72 MMHG | TEMPERATURE: 97.8 F | HEIGHT: 63 IN | BODY MASS INDEX: 21.97 KG/M2 | WEIGHT: 124 LBS | HEART RATE: 72 BPM | SYSTOLIC BLOOD PRESSURE: 137 MMHG

## 2024-02-15 LAB
APPEARANCE UR: CLEAR
BACTERIA URNS QL MICRO: ABNORMAL /HPF
BASOPHILS # BLD: 0.1 K/UL (ref 0–0.1)
BASOPHILS NFR BLD: 1 % (ref 0–1)
BILIRUB UR QL: NEGATIVE
COLOR UR: ABNORMAL
DIFFERENTIAL METHOD BLD: ABNORMAL
EKG ATRIAL RATE: 74 BPM
EKG DIAGNOSIS: NORMAL
EKG P AXIS: 19 DEGREES
EKG P-R INTERVAL: 158 MS
EKG Q-T INTERVAL: 396 MS
EKG QRS DURATION: 66 MS
EKG QTC CALCULATION (BAZETT): 439 MS
EKG R AXIS: -13 DEGREES
EKG T AXIS: 1 DEGREES
EKG VENTRICULAR RATE: 74 BPM
EOSINOPHIL # BLD: 0.5 K/UL (ref 0–0.4)
EOSINOPHIL NFR BLD: 6 % (ref 0–7)
EPITH CASTS URNS QL MICRO: ABNORMAL /LPF
ERYTHROCYTE [DISTWIDTH] IN BLOOD BY AUTOMATED COUNT: 12.7 % (ref 11.5–14.5)
GLUCOSE UR STRIP.AUTO-MCNC: NEGATIVE MG/DL
HCT VFR BLD AUTO: 38.1 % (ref 35–47)
HGB BLD-MCNC: 12.1 G/DL (ref 11.5–16)
HGB UR QL STRIP: NEGATIVE
HYALINE CASTS URNS QL MICRO: ABNORMAL /LPF (ref 0–5)
IMM GRANULOCYTES # BLD AUTO: 0 K/UL (ref 0–0.04)
IMM GRANULOCYTES NFR BLD AUTO: 0 % (ref 0–0.5)
KETONES UR QL STRIP.AUTO: NEGATIVE MG/DL
LEUKOCYTE ESTERASE UR QL STRIP.AUTO: ABNORMAL
LYMPHOCYTES # BLD: 1.6 K/UL (ref 0.8–3.5)
LYMPHOCYTES NFR BLD: 20 % (ref 12–49)
MCH RBC QN AUTO: 30 PG (ref 26–34)
MCHC RBC AUTO-ENTMCNC: 31.8 G/DL (ref 30–36.5)
MCV RBC AUTO: 94.5 FL (ref 80–99)
MONOCYTES # BLD: 0.9 K/UL (ref 0–1)
MONOCYTES NFR BLD: 11 % (ref 5–13)
NEUTS SEG # BLD: 4.9 K/UL (ref 1.8–8)
NEUTS SEG NFR BLD: 62 % (ref 32–75)
NITRITE UR QL STRIP.AUTO: NEGATIVE
NRBC # BLD: 0 K/UL (ref 0–0.01)
NRBC BLD-RTO: 0 PER 100 WBC
PH UR STRIP: 7 (ref 5–8)
PLATELET # BLD AUTO: 336 K/UL (ref 150–400)
PMV BLD AUTO: 10.3 FL (ref 8.9–12.9)
PROT UR STRIP-MCNC: NEGATIVE MG/DL
RBC # BLD AUTO: 4.03 M/UL (ref 3.8–5.2)
RBC #/AREA URNS HPF: ABNORMAL /HPF (ref 0–5)
SP GR UR REFRACTOMETRY: 1.01 (ref 1–1.03)
URINE CULTURE IF INDICATED: ABNORMAL
UROBILINOGEN UR QL STRIP.AUTO: 0.2 EU/DL (ref 0.2–1)
WBC # BLD AUTO: 7.9 K/UL (ref 3.6–11)
WBC URNS QL MICRO: ABNORMAL /HPF (ref 0–4)

## 2024-02-15 PROCEDURE — 36415 COLL VENOUS BLD VENIPUNCTURE: CPT

## 2024-02-15 PROCEDURE — 93005 ELECTROCARDIOGRAM TRACING: CPT | Performed by: OBSTETRICS & GYNECOLOGY

## 2024-02-15 PROCEDURE — 85025 COMPLETE CBC W/AUTO DIFF WBC: CPT

## 2024-02-15 PROCEDURE — 81001 URINALYSIS AUTO W/SCOPE: CPT

## 2024-02-15 NOTE — PERIOP NOTE
PATIENT GIVEN SURGICAL SITE INFECTION FAQ HANDOUT AND HAND WASHING TIP SHEET. PREOP INSTRUCTIONS REVIEWED AND PATIENT VERBALIZES UNDERSTANDING OF INSTRUCTIONS. PATIENT HAS BEEN GIVEN THE OPPORTUNITY TO ASK ADDITIONAL QUESTIONS.

## 2024-02-15 NOTE — PERIOP NOTE
[facility]  PREOPERATIVE INSTRUCTIONS    Surgery Date:   2/22/24     Your surgeon's office or [facility] staff will call you between 4 PM- 8 PM the day before surgery with your arrival time. If your surgery is on a Monday, you will receive a call the preceding Friday. If your surgeon;s office has given you arrival time, then go by that time.    Please report to [facility] Patient Access/Admitting on the 1st floor.  Bring your insurance card, photo identification, and any copayment ( if applicable).   If you are going home the same day of your surgery, you must have a responsible adult to drive you home. You need to have a responsible adult to stay with you the first 24 hours after surgery and you should not drive a car for 24 hours following your surgery.  If you are being admitted to the hospital, please leave personal belongings/luggage in your car until you have an assigned hospital room number.  Nothing to eat or drink after midnight the night before surgery. This includes no water, gum, mints, coffee, juice, etc.  Please note special instructions, if applicable, below for medications.  Do NOT drink alcohol or smoke 24 hours before surgery. STOP smoking for 14 days prior as it helps with breathing and healing after surgery.  Please wear comfortable clothes. Wear glasses instead of contacts. We ask that all money and jewelry and valuables to be left at home. Wear no makeup, particularly mascara the day of surgery.  All body piercings, rings, and jewelry need to be removed and left at home. Remove all nail polish except for clear. Please wear your hair loose or down. Please no pony-tails, buns, or any metal hair accessories. You may wear deodorant, unless having breast surgery. Do not shave any body area within 24 hours of your surgery.  Please follow all instructions to avoid any potential surgical cancellation.  Should your physical condition change, (i.e. fever, cold, flu, etc.) please notify your

## 2024-02-16 NOTE — PERIOP NOTE
REVIEWED UA RESULTS W/ NPNEHAL AS LAB DID NOT INDICATE THAT A CULTURE WAS NEEDED.  NP REVIEWED RESULTS AND CHART AND DID NOT FEEL A CULTURE IS NEEDED AS WELL.

## 2024-02-20 NOTE — DISCHARGE INSTRUCTIONS
decreased circulation or nerve impairment to extremity:  Change in color, persistent numbness, tingling, coldness or increased pain.  Any questions

## 2024-02-20 NOTE — H&P
01/29/2024  Marriage and Sexuality  How many children do you have?: 3 (Notes: Daughter is Pradip Lyn (Pt of Dr. GARCÍA). granddaughter-Jo, born 1/2012.)  Other  Marital status:  (Notes: since 1965)  Surgical History  Surgical History not reviewed (last reviewed 01/29/2024)    Quadrantectomy of breast - 05/26/2010 - right breast  GYN History  GYN History not reviewed (last reviewed 01/29/2024)  Date of LMP: (Notes: ).  Date of Last Pap Smear: 01/07/2020 (Notes: NIL).  Abnormal Pap: N.  Date of Last Mammogram: 07/01/2019 (Notes: normal; ho breast cancer).  Current Birth Control Method: Menopause.  STIs/STDs: N.  Date of Last Colonoscopy: (Notes: 2018-repeat in 3 years; precancerous polyp found).  breast cancer-invasive ductal ca stage 1-right. DEXA 11/12/2009, osteopenia. colonoscopies q5y, next due in 2018.  Obstetric History  Obstetric History not reviewed (last reviewed 12/15/2023)    TOTAL FULL PRE AB. I AB. S ECTOPICS MULTIPLE LIVING   4 3   1   3   svdx3, sabx1  Past Medical History  Past Medical History not reviewed (last reviewed 12/15/2023)  Breast Cancer: Y - invasive ductal ca stage 1 - right  Hypercholesterolemia (high cholesterol): Y  Hypertension (high blood pressure): Y  HPI  Patient presents for a pre-op exam.    Procedure: lefort colpocleisis vs obliterative anterior and posterior repair, cystoscopy, possible urethral bulking on 02- at [facility]  Reason for procedure: female genital prolapse, stress urinary incontinence    PAT scheduled for: 02-  PCP clearance: n/a    Medications and allergies have been reviewed. She denies any latex, iodine, shellfish, lidocaine, or adhesive allergy.  She denies any complications with anesthesia in the past.  She denies any known bleeding or platelet disorders.    Took Gemtesa samples and found it helpful, but it is not affordable ($400/30day supply). She is doing well without it and is not interested in an alternative med.  ROS  Additionally

## 2024-02-20 NOTE — PERIOP NOTE
SHEFALI DELATORRE NP CONSULTED ON EKG FROM 2/15/24. SHEFALI COMPARED TO PREVIOUS EKG FROM 7/28/23. SHEFALI CONSULTED EKG OKAY FOR SURGERY.

## 2024-02-22 ENCOUNTER — ANESTHESIA (OUTPATIENT)
Facility: HOSPITAL | Age: 79
End: 2024-02-22
Payer: MEDICARE

## 2024-02-22 ENCOUNTER — ANESTHESIA EVENT (OUTPATIENT)
Facility: HOSPITAL | Age: 79
End: 2024-02-22
Payer: MEDICARE

## 2024-02-22 ENCOUNTER — HOSPITAL ENCOUNTER (OUTPATIENT)
Facility: HOSPITAL | Age: 79
Setting detail: OUTPATIENT SURGERY
Discharge: HOME | End: 2024-02-22
Attending: OBSTETRICS & GYNECOLOGY | Admitting: OBSTETRICS & GYNECOLOGY
Payer: MEDICARE

## 2024-02-22 VITALS
WEIGHT: 128 LBS | OXYGEN SATURATION: 96 % | SYSTOLIC BLOOD PRESSURE: 128 MMHG | BODY MASS INDEX: 22.68 KG/M2 | DIASTOLIC BLOOD PRESSURE: 61 MMHG | HEART RATE: 80 BPM | RESPIRATION RATE: 17 BRPM | TEMPERATURE: 97.8 F | HEIGHT: 63 IN

## 2024-02-22 PROCEDURE — 6360000002 HC RX W HCPCS: Performed by: OBSTETRICS & GYNECOLOGY

## 2024-02-22 PROCEDURE — 6370000000 HC RX 637 (ALT 250 FOR IP): Performed by: OBSTETRICS & GYNECOLOGY

## 2024-02-22 PROCEDURE — 3700000001 HC ADD 15 MINUTES (ANESTHESIA): Performed by: OBSTETRICS & GYNECOLOGY

## 2024-02-22 PROCEDURE — 2580000003 HC RX 258: Performed by: NURSE ANESTHETIST, CERTIFIED REGISTERED

## 2024-02-22 PROCEDURE — 2580000003 HC RX 258: Performed by: ANESTHESIOLOGY

## 2024-02-22 PROCEDURE — 3600000003 HC SURGERY LEVEL 3 BASE: Performed by: OBSTETRICS & GYNECOLOGY

## 2024-02-22 PROCEDURE — 6360000002 HC RX W HCPCS: Performed by: ANESTHESIOLOGY

## 2024-02-22 PROCEDURE — 3600000013 HC SURGERY LEVEL 3 ADDTL 15MIN: Performed by: OBSTETRICS & GYNECOLOGY

## 2024-02-22 PROCEDURE — 7100000001 HC PACU RECOVERY - ADDTL 15 MIN: Performed by: OBSTETRICS & GYNECOLOGY

## 2024-02-22 PROCEDURE — 6360000002 HC RX W HCPCS: Performed by: NURSE ANESTHETIST, CERTIFIED REGISTERED

## 2024-02-22 PROCEDURE — 7100000000 HC PACU RECOVERY - FIRST 15 MIN: Performed by: OBSTETRICS & GYNECOLOGY

## 2024-02-22 PROCEDURE — 2500000003 HC RX 250 WO HCPCS: Performed by: NURSE ANESTHETIST, CERTIFIED REGISTERED

## 2024-02-22 PROCEDURE — 2709999900 HC NON-CHARGEABLE SUPPLY: Performed by: OBSTETRICS & GYNECOLOGY

## 2024-02-22 PROCEDURE — 2500000003 HC RX 250 WO HCPCS: Performed by: OBSTETRICS & GYNECOLOGY

## 2024-02-22 PROCEDURE — 6370000000 HC RX 637 (ALT 250 FOR IP): Performed by: ANESTHESIOLOGY

## 2024-02-22 PROCEDURE — 2580000003 HC RX 258: Performed by: OBSTETRICS & GYNECOLOGY

## 2024-02-22 PROCEDURE — 3700000000 HC ANESTHESIA ATTENDED CARE: Performed by: OBSTETRICS & GYNECOLOGY

## 2024-02-22 RX ORDER — DIPHENHYDRAMINE HYDROCHLORIDE 50 MG/ML
12.5 INJECTION INTRAMUSCULAR; INTRAVENOUS
Status: DISCONTINUED | OUTPATIENT
Start: 2024-02-22 | End: 2024-02-22 | Stop reason: HOSPADM

## 2024-02-22 RX ORDER — SODIUM CHLORIDE 0.9 % (FLUSH) 0.9 %
5-40 SYRINGE (ML) INJECTION EVERY 12 HOURS SCHEDULED
Status: DISCONTINUED | OUTPATIENT
Start: 2024-02-22 | End: 2024-02-22 | Stop reason: HOSPADM

## 2024-02-22 RX ORDER — LIDOCAINE HYDROCHLORIDE 10 MG/ML
1 INJECTION, SOLUTION EPIDURAL; INFILTRATION; INTRACAUDAL; PERINEURAL
Status: DISCONTINUED | OUTPATIENT
Start: 2024-02-22 | End: 2024-02-22 | Stop reason: HOSPADM

## 2024-02-22 RX ORDER — ONDANSETRON 2 MG/ML
INJECTION INTRAMUSCULAR; INTRAVENOUS PRN
Status: DISCONTINUED | OUTPATIENT
Start: 2024-02-22 | End: 2024-02-22 | Stop reason: SDUPTHER

## 2024-02-22 RX ORDER — FENTANYL CITRATE 50 UG/ML
INJECTION, SOLUTION INTRAMUSCULAR; INTRAVENOUS PRN
Status: DISCONTINUED | OUTPATIENT
Start: 2024-02-22 | End: 2024-02-22 | Stop reason: SDUPTHER

## 2024-02-22 RX ORDER — PROPOFOL 10 MG/ML
INJECTION, EMULSION INTRAVENOUS PRN
Status: DISCONTINUED | OUTPATIENT
Start: 2024-02-22 | End: 2024-02-22 | Stop reason: SDUPTHER

## 2024-02-22 RX ORDER — SODIUM CHLORIDE, SODIUM LACTATE, POTASSIUM CHLORIDE, CALCIUM CHLORIDE 600; 310; 30; 20 MG/100ML; MG/100ML; MG/100ML; MG/100ML
INJECTION, SOLUTION INTRAVENOUS CONTINUOUS
Status: DISCONTINUED | OUTPATIENT
Start: 2024-02-22 | End: 2024-02-22 | Stop reason: HOSPADM

## 2024-02-22 RX ORDER — PHENAZOPYRIDINE HYDROCHLORIDE 100 MG/1
200 TABLET, FILM COATED ORAL ONCE
Status: COMPLETED | OUTPATIENT
Start: 2024-02-22 | End: 2024-02-22

## 2024-02-22 RX ORDER — IPRATROPIUM BROMIDE AND ALBUTEROL SULFATE 2.5; .5 MG/3ML; MG/3ML
1 SOLUTION RESPIRATORY (INHALATION)
Status: DISCONTINUED | OUTPATIENT
Start: 2024-02-22 | End: 2024-02-22 | Stop reason: HOSPADM

## 2024-02-22 RX ORDER — ACETAMINOPHEN 500 MG
1000 TABLET ORAL ONCE
Status: COMPLETED | OUTPATIENT
Start: 2024-02-22 | End: 2024-02-22

## 2024-02-22 RX ORDER — FENTANYL CITRATE 50 UG/ML
100 INJECTION, SOLUTION INTRAMUSCULAR; INTRAVENOUS
Status: DISCONTINUED | OUTPATIENT
Start: 2024-02-22 | End: 2024-02-22 | Stop reason: HOSPADM

## 2024-02-22 RX ORDER — KETOROLAC TROMETHAMINE 30 MG/ML
INJECTION, SOLUTION INTRAMUSCULAR; INTRAVENOUS PRN
Status: DISCONTINUED | OUTPATIENT
Start: 2024-02-22 | End: 2024-02-22 | Stop reason: SDUPTHER

## 2024-02-22 RX ORDER — SODIUM CHLORIDE 0.9 % (FLUSH) 0.9 %
5-40 SYRINGE (ML) INJECTION PRN
Status: DISCONTINUED | OUTPATIENT
Start: 2024-02-22 | End: 2024-02-22 | Stop reason: HOSPADM

## 2024-02-22 RX ORDER — ONDANSETRON 2 MG/ML
4 INJECTION INTRAMUSCULAR; INTRAVENOUS
Status: COMPLETED | OUTPATIENT
Start: 2024-02-22 | End: 2024-02-22

## 2024-02-22 RX ORDER — LORAZEPAM 2 MG/ML
0.5 INJECTION INTRAMUSCULAR
Status: DISCONTINUED | OUTPATIENT
Start: 2024-02-22 | End: 2024-02-22 | Stop reason: HOSPADM

## 2024-02-22 RX ORDER — MIDAZOLAM HYDROCHLORIDE 2 MG/2ML
2 INJECTION, SOLUTION INTRAMUSCULAR; INTRAVENOUS
Status: DISCONTINUED | OUTPATIENT
Start: 2024-02-22 | End: 2024-02-22 | Stop reason: HOSPADM

## 2024-02-22 RX ORDER — BUPIVACAINE HYDROCHLORIDE AND EPINEPHRINE 2.5; 5 MG/ML; UG/ML
INJECTION, SOLUTION EPIDURAL; INFILTRATION; INTRACAUDAL; PERINEURAL PRN
Status: DISCONTINUED | OUTPATIENT
Start: 2024-02-22 | End: 2024-02-22 | Stop reason: HOSPADM

## 2024-02-22 RX ORDER — SODIUM CHLORIDE 9 MG/ML
INJECTION, SOLUTION INTRAVENOUS PRN
Status: DISCONTINUED | OUTPATIENT
Start: 2024-02-22 | End: 2024-02-22 | Stop reason: HOSPADM

## 2024-02-22 RX ORDER — LIDOCAINE HYDROCHLORIDE 20 MG/ML
INJECTION, SOLUTION EPIDURAL; INFILTRATION; INTRACAUDAL; PERINEURAL PRN
Status: DISCONTINUED | OUTPATIENT
Start: 2024-02-22 | End: 2024-02-22 | Stop reason: SDUPTHER

## 2024-02-22 RX ORDER — FENTANYL CITRATE 50 UG/ML
25 INJECTION, SOLUTION INTRAMUSCULAR; INTRAVENOUS EVERY 5 MIN PRN
Status: DISCONTINUED | OUTPATIENT
Start: 2024-02-22 | End: 2024-02-22 | Stop reason: HOSPADM

## 2024-02-22 RX ORDER — OXYCODONE HYDROCHLORIDE 5 MG/1
5 TABLET ORAL
Status: DISCONTINUED | OUTPATIENT
Start: 2024-02-22 | End: 2024-02-22 | Stop reason: HOSPADM

## 2024-02-22 RX ORDER — PROCHLORPERAZINE EDISYLATE 5 MG/ML
5 INJECTION INTRAMUSCULAR; INTRAVENOUS
Status: DISCONTINUED | OUTPATIENT
Start: 2024-02-22 | End: 2024-02-22 | Stop reason: HOSPADM

## 2024-02-22 RX ORDER — DEXAMETHASONE SODIUM PHOSPHATE 4 MG/ML
INJECTION, SOLUTION INTRA-ARTICULAR; INTRALESIONAL; INTRAMUSCULAR; INTRAVENOUS; SOFT TISSUE PRN
Status: DISCONTINUED | OUTPATIENT
Start: 2024-02-22 | End: 2024-02-22 | Stop reason: SDUPTHER

## 2024-02-22 RX ORDER — SODIUM CHLORIDE, SODIUM LACTATE, POTASSIUM CHLORIDE, CALCIUM CHLORIDE 600; 310; 30; 20 MG/100ML; MG/100ML; MG/100ML; MG/100ML
INJECTION, SOLUTION INTRAVENOUS CONTINUOUS PRN
Status: DISCONTINUED | OUTPATIENT
Start: 2024-02-22 | End: 2024-02-22 | Stop reason: SDUPTHER

## 2024-02-22 RX ORDER — HYDROMORPHONE HYDROCHLORIDE 1 MG/ML
0.5 INJECTION, SOLUTION INTRAMUSCULAR; INTRAVENOUS; SUBCUTANEOUS EVERY 5 MIN PRN
Status: DISCONTINUED | OUTPATIENT
Start: 2024-02-22 | End: 2024-02-22 | Stop reason: HOSPADM

## 2024-02-22 RX ORDER — HYDRALAZINE HYDROCHLORIDE 20 MG/ML
10 INJECTION INTRAMUSCULAR; INTRAVENOUS
Status: DISCONTINUED | OUTPATIENT
Start: 2024-02-22 | End: 2024-02-22 | Stop reason: HOSPADM

## 2024-02-22 RX ADMIN — PROPOFOL 150 MG: 10 INJECTION, EMULSION INTRAVENOUS at 11:48

## 2024-02-22 RX ADMIN — SODIUM CHLORIDE, POTASSIUM CHLORIDE, SODIUM LACTATE AND CALCIUM CHLORIDE: 600; 310; 30; 20 INJECTION, SOLUTION INTRAVENOUS at 11:43

## 2024-02-22 RX ADMIN — LIDOCAINE HYDROCHLORIDE 60 MG: 20 INJECTION, SOLUTION EPIDURAL; INFILTRATION; INTRACAUDAL; PERINEURAL at 11:48

## 2024-02-22 RX ADMIN — WATER 2000 MG: 1 INJECTION INTRAMUSCULAR; INTRAVENOUS; SUBCUTANEOUS at 11:52

## 2024-02-22 RX ADMIN — SODIUM CHLORIDE, POTASSIUM CHLORIDE, SODIUM LACTATE AND CALCIUM CHLORIDE: 600; 310; 30; 20 INJECTION, SOLUTION INTRAVENOUS at 10:33

## 2024-02-22 RX ADMIN — FENTANYL CITRATE 25 MCG: 50 INJECTION, SOLUTION INTRAMUSCULAR; INTRAVENOUS at 12:40

## 2024-02-22 RX ADMIN — FENTANYL CITRATE 25 MCG: 50 INJECTION, SOLUTION INTRAMUSCULAR; INTRAVENOUS at 12:33

## 2024-02-22 RX ADMIN — ACETAMINOPHEN 1000 MG: 500 TABLET ORAL at 11:00

## 2024-02-22 RX ADMIN — ONDANSETRON 4 MG: 2 INJECTION INTRAMUSCULAR; INTRAVENOUS at 13:23

## 2024-02-22 RX ADMIN — FENTANYL CITRATE 25 MCG: 50 INJECTION INTRAMUSCULAR; INTRAVENOUS at 13:21

## 2024-02-22 RX ADMIN — PHENAZOPYRIDINE HYDROCHLORIDE 200 MG: 100 TABLET ORAL at 11:00

## 2024-02-22 RX ADMIN — DEXAMETHASONE SODIUM PHOSPHATE 4 MG: 4 INJECTION INTRA-ARTICULAR; INTRALESIONAL; INTRAMUSCULAR; INTRAVENOUS; SOFT TISSUE at 11:54

## 2024-02-22 RX ADMIN — ONDANSETRON 4 MG: 2 INJECTION INTRAMUSCULAR; INTRAVENOUS at 12:30

## 2024-02-22 RX ADMIN — FENTANYL CITRATE 50 MCG: 50 INJECTION, SOLUTION INTRAMUSCULAR; INTRAVENOUS at 11:48

## 2024-02-22 RX ADMIN — KETOROLAC TROMETHAMINE 15 MG: 30 INJECTION, SOLUTION INTRAMUSCULAR at 12:38

## 2024-02-22 ASSESSMENT — PAIN SCALES - GENERAL
PAINLEVEL_OUTOF10: 2
PAINLEVEL_OUTOF10: 5
PAINLEVEL_OUTOF10: 0

## 2024-02-22 ASSESSMENT — PAIN - FUNCTIONAL ASSESSMENT: PAIN_FUNCTIONAL_ASSESSMENT: NONE - DENIES PAIN

## 2024-02-22 ASSESSMENT — PAIN DESCRIPTION - LOCATION: LOCATION: PERINEUM

## 2024-02-22 NOTE — ANESTHESIA POSTPROCEDURE EVALUATION
Post-Anesthesia Evaluation and Assessment    Patient: Zofia Somers MRN: [id number]  SSN: [id number]    YOB: 1945  Age: 79 y.o.  Sex: female      I have evaluated the patient and they are stable and ready for discharge from the PACU.     Cardiovascular Function/Vital Signs  Visit Vitals  /61   Pulse 80   Temp 97.8 °F (36.6 °C) (Oral)   Resp 17   Ht 1.6 m (5' 3\")   Wt 58.1 kg (128 lb)   SpO2 96%   BMI 22.67 kg/m²       Patient is status post Other anesthesia for Procedure(s):  LEFORT COLPOCLEISIS REPAIR, CYSTOSCOPY.    Nausea/Vomiting: None    Postoperative hydration reviewed and adequate.    Pain:      Managed    Neurological Status:       At baseline    Mental Status, Level of Consciousness: Alert and  oriented to person, place, and time    Pulmonary Status:       Adequate oxygenation and airway patent    Complications related to anesthesia: None    Post-anesthesia assessment completed. No concerns    Signed By: Jessica Escobar MD     February 22, 2024            Department of Anesthesiology  Postprocedure Note    Patient: Zofia Somers  MRN: [id number]  YOB: 1945  Date of evaluation: 2/22/2024    Procedure Summary       Date: 02/22/24 Room / Location: [facility] MAIN OR [address] [place] [address] MAIN OR    Anesthesia Start: 1143 Anesthesia Stop: 1250    Procedure: LEFORT COLPOCLEISIS REPAIR, CYSTOSCOPY (Vagina ) Diagnosis:       Female genital prolapse, unspecified type      Stress incontinence, female      (Female genital prolapse, unspecified type [N81.9])      (Stress incontinence, female [N39.3])    Providers: Jayda Díaz MD Responsible Provider: Jessica Escobar MD    Anesthesia Type: General ASA Status: 2            Anesthesia Type: General    Christiano Phase I: Christiano Score: 10    Christiano Phase II:      Anesthesia Post Evaluation    No notable events documented.

## 2024-02-22 NOTE — ANESTHESIA PRE PROCEDURE
Hypercholesterolemia    • Osteoarthritis (arthritis due to wear and tear of joints)    • Osteoporosis    • Pancreatitis    • Prolapse of female genital organs 01/27/2020    Dr. Jayda Díaz ([facility]). 1/23/20: Pessary placed.    • Tubular adenoma of colon 08/21/2018       Past Surgical History:        Procedure Laterality Date   • BREAST BIOPSY Right 2010    POSITIVE; CANCEROUS   • BREAST LUMPECTOMY Right 2010    right   • COLONOSCOPY N/A 11/01/2021    COLONOSCOPY   :- performed by La Nena Sánchez MD at [facility] ENDOSCOPY. 3 tubular adenomas, 1 hyperplastic polyp, diverticulosis, int hemorrhoids, repeat in 3 yrs   • COLONOSCOPY N/A 04/27/2018    COLONOSCOPY performed by La Nena Sánchez MD at [facility] ENDOSCOPY; tubular adenoma at cecum, hyperplastic polyp at rectum, hemorrhoids, diverticulosis, repeat in 3 yrs   • GI      hemorrhoid surgery - in office       Social History:    Social History     Tobacco Use   • Smoking status: Never   • Smokeless tobacco: Never   Substance Use Topics   • Alcohol use: Yes     Alcohol/week: 2.0 standard drinks of alcohol     Types: 1 Glasses of wine, 1 Cans of beer per week     Comment: OCCASSIONAL                                Counseling given: Not Answered      Vital Signs (Current):   Vitals:    02/22/24 1014   BP: (!) 117/52   Pulse: 73   Resp: 14   Temp: 97.7 °F (36.5 °C)   TempSrc: Oral   Weight: 58.1 kg (128 lb)   Height: 1.6 m (5' 3\")                                              BP Readings from Last 3 Encounters:   02/22/24 (!) 117/52   02/15/24 137/72   12/22/23 (!) 156/73       NPO Status: Time of last liquid consumption: 0600                        Time of last solid consumption: 2100                        Date of last liquid consumption: 02/22/24                        Date of last solid food consumption: 02/21/24    BMI:   Wt Readings from Last 3 Encounters:   02/22/24 58.1 kg (128 lb)   02/15/24 56.2 kg (124 lb)   12/22/23 59.7 kg (131 lb 9.8 oz)     Body mass

## 2024-02-22 NOTE — OP NOTE
Date: 2-22-24    Patient: Zofia Somers    Surgery Performed:Lefort Colpocleisis, perineorrhaphy, cystoscopy    Pre-operative diagnosis: Stage 2 pelvic organ prolapse    Post-operative diagnosis: same    Surgeon: Jayda Díaz MD    Surgical Assistants: [facility] x 2    Anesthesia: LMA    Findings: normal appearing bladder with bilaterally effluxing ureters, no foreign body in bladder, normal rectal exam    EBL: 15 cc    UOP: 200cc, pyridium stained    Specimens removed: none    Prosthetic devices: none    Drains: none    Indications: Patient with symptomatic pelvic organ prolapse underwent the above procedures understanding the alternatives, purpose, risks, benefits, complications, and dipti-operative course.    Technique:  Patient was taken to the OR and placed on the OR table in supine position.  She was given a general anesthetic.  She was then repositioned in supportive yellowfin stir-ups with care taken to safety positioning all 4 extremities.  She was prepped and draped in the usual sterile fashion.     To ensure normally effluxing ureters before starting the case, I performed a 70 degree cystoscopy with normal findings. Next, a Wilks catheter was inserted, which drained pyridium stained urine for the remainder of the case.     A Lonestar retractor was placed for good visualization. The anterior wall was the predominant prolapse at 0/+1cm beyond the hymen with the cervix at -4cm. The cervix was held with an Allis. I then began the colpocleisis.  0.25% Marcaine with epinephrine was injected under the vaginal epithelium for hemostasis and hydrodissection. Then, a small rectangular piece of vaginal skin was sharply dissected from the anterior and posterior vagina.  Then, a series of imbricating 2-0 PDS sutures were placed through the anterior and posterior endopelvic fascia to reduce the denuded vagina.The vaginal incision was closed using a 0 vicryl in a running fashion.    Cystoscopy for lower urinary tract

## 2024-03-03 ENCOUNTER — HOSPITAL ENCOUNTER (EMERGENCY)
Facility: HOSPITAL | Age: 79
Discharge: HOME | End: 2024-03-03
Attending: EMERGENCY MEDICINE
Payer: MEDICARE

## 2024-03-03 VITALS
WEIGHT: 128.53 LBS | OXYGEN SATURATION: 95 % | TEMPERATURE: 98.3 F | DIASTOLIC BLOOD PRESSURE: 62 MMHG | HEIGHT: 63 IN | BODY MASS INDEX: 22.77 KG/M2 | SYSTOLIC BLOOD PRESSURE: 173 MMHG | RESPIRATION RATE: 16 BRPM | HEART RATE: 84 BPM

## 2024-03-03 DIAGNOSIS — N39.0 URINARY TRACT INFECTION WITHOUT HEMATURIA, SITE UNSPECIFIED: Primary | ICD-10-CM

## 2024-03-03 LAB
APPEARANCE UR: ABNORMAL
BACTERIA URNS QL MICRO: ABNORMAL /HPF
BILIRUB UR QL: NEGATIVE
COLOR UR: ABNORMAL
EPITH CASTS URNS QL MICRO: ABNORMAL /LPF
GLUCOSE UR STRIP.AUTO-MCNC: 100 MG/DL
HGB UR QL STRIP: ABNORMAL
KETONES UR QL STRIP.AUTO: NEGATIVE MG/DL
LEUKOCYTE ESTERASE UR QL STRIP.AUTO: ABNORMAL
NITRITE UR QL STRIP.AUTO: POSITIVE
PH UR STRIP: 5.5 (ref 5–8)
PROT UR STRIP-MCNC: 100 MG/DL
RBC #/AREA URNS HPF: >100 /HPF (ref 0–5)
SP GR UR REFRACTOMETRY: 1.01 (ref 1–1.03)
URINE CULTURE IF INDICATED: ABNORMAL
UROBILINOGEN UR QL STRIP.AUTO: 1 EU/DL (ref 0.2–1)
WBC URNS QL MICRO: >100 /HPF (ref 0–4)

## 2024-03-03 PROCEDURE — 81001 URINALYSIS AUTO W/SCOPE: CPT

## 2024-03-03 PROCEDURE — 99283 EMERGENCY DEPT VISIT LOW MDM: CPT

## 2024-03-03 PROCEDURE — 87086 URINE CULTURE/COLONY COUNT: CPT

## 2024-03-03 PROCEDURE — 87077 CULTURE AEROBIC IDENTIFY: CPT

## 2024-03-03 PROCEDURE — 6370000000 HC RX 637 (ALT 250 FOR IP): Performed by: EMERGENCY MEDICINE

## 2024-03-03 PROCEDURE — 87186 SC STD MICRODIL/AGAR DIL: CPT

## 2024-03-03 RX ORDER — CEPHALEXIN 500 MG/1
500 CAPSULE ORAL 2 TIMES DAILY
Qty: 14 CAPSULE | Refills: 0 | Status: SHIPPED | OUTPATIENT
Start: 2024-03-03 | End: 2024-03-10

## 2024-03-03 RX ORDER — ACETAMINOPHEN 500 MG
1000 TABLET ORAL 3 TIMES DAILY
Qty: 120 TABLET | Refills: 3 | Status: SHIPPED | OUTPATIENT
Start: 2024-03-03

## 2024-03-03 RX ORDER — PHENAZOPYRIDINE HYDROCHLORIDE 100 MG/1
100 TABLET, FILM COATED ORAL 3 TIMES DAILY PRN
Qty: 9 TABLET | Refills: 0 | Status: SHIPPED | OUTPATIENT
Start: 2024-03-03 | End: 2024-03-06

## 2024-03-03 RX ORDER — CEPHALEXIN 250 MG/1
500 CAPSULE ORAL
Status: COMPLETED | OUTPATIENT
Start: 2024-03-03 | End: 2024-03-03

## 2024-03-03 RX ORDER — IBUPROFEN 400 MG/1
400 TABLET ORAL EVERY 6 HOURS PRN
Qty: 120 TABLET | Refills: 0 | Status: SHIPPED | OUTPATIENT
Start: 2024-03-03

## 2024-03-03 RX ADMIN — CEPHALEXIN 500 MG: 250 CAPSULE ORAL at 23:16

## 2024-03-03 ASSESSMENT — PAIN SCALES - GENERAL: PAINLEVEL_OUTOF10: 3

## 2024-03-03 ASSESSMENT — PAIN DESCRIPTION - DESCRIPTORS: DESCRIPTORS: BURNING;SHARP

## 2024-03-03 ASSESSMENT — PAIN DESCRIPTION - PAIN TYPE: TYPE: ACUTE PAIN

## 2024-03-03 ASSESSMENT — PAIN DESCRIPTION - ORIENTATION: ORIENTATION: INNER

## 2024-03-03 ASSESSMENT — PAIN DESCRIPTION - FREQUENCY: FREQUENCY: INTERMITTENT

## 2024-03-03 ASSESSMENT — PAIN DESCRIPTION - LOCATION: LOCATION: ABDOMEN

## 2024-03-03 ASSESSMENT — PAIN - FUNCTIONAL ASSESSMENT: PAIN_FUNCTIONAL_ASSESSMENT: 0-10

## 2024-03-04 NOTE — ED TRIAGE NOTES
TRIAGE NOTE:had colpocleisis surgery of bladder 2/22/24 at [facility]. started with 1 day of burning with urination and left flank pain hx of freq urinary infections last one 1/30/24.took AZO at home today.

## 2024-03-06 LAB
BACTERIA SPEC CULT: ABNORMAL
BACTERIA SPEC CULT: ABNORMAL
CC UR VC: ABNORMAL
SERVICE CMNT-IMP: ABNORMAL

## 2024-03-06 RX ORDER — CIPROFLOXACIN 500 MG/1
500 TABLET, FILM COATED ORAL 2 TIMES DAILY
Qty: 14 TABLET | Refills: 0 | Status: SHIPPED | OUTPATIENT
Start: 2024-03-06 | End: 2024-03-13

## 2024-03-06 NOTE — ED NOTES
Microbiology called to report Critical Result.    Urine Culture shows ESBL E-coli    Lab: [facility].    Dr. Garay notified.

## 2024-03-06 NOTE — ED PROVIDER NOTES
I was notified by the microbiology lab that the patient's urine culture grew out ESBL E. coli.  It is sensitive to fluoroquinolones.  Patient is already been prescribed ciprofloxacin by SCOTT Cardoso.  She still having some dysuria at the end of urination but states overall she feels well.  No fevers.  She will be picking up her prescription today and I discussed return precautions with her.     Ramírez Garay MD  03/06/24 [id number]    
Performed:Lefort Colpocleisis, perineorrhaphy, cystoscopy    79-year-old female presenting ER with report of UTI.        Nursing Notes were reviewed.    REVIEW OF SYSTEMS       Review of Systems      PAST MEDICAL HISTORY     Past Medical History:   Diagnosis Date    Acute pancreatitis 04/10/2017    Due to pancreas divisum    Breast cancer (HCC) 2010    Cancer (HCC) 05/2010    R breast, stage IA; T1b,pN0, M0; Dr. Zulema Stallings - surgery/radiation    Deviated septum     HISTORY OF DEVIATED SEPTUM    Eczema 10/09/2014    GERD (gastroesophageal reflux disease)     History of therapeutic radiation     HTN (hypertension)     Hypercholesterolemia     Osteoarthritis (arthritis due to wear and tear of joints)     Osteoporosis     Pancreatitis     Prolapse of female genital organs 01/27/2020    Dr. Jayda Díaz ([facility]). 1/23/20: Pessary placed.     Tubular adenoma of colon 08/21/2018    UTI (urinary tract infection)          SURGICAL HISTORY       Past Surgical History:   Procedure Laterality Date    BREAST BIOPSY Right 2010    POSITIVE; CANCEROUS    BREAST LUMPECTOMY Right 2010    right    COLONOSCOPY N/A 11/01/2021    COLONOSCOPY   :- performed by La Nena Sánchez MD at [facility] ENDOSCOPY. 3 tubular adenomas, 1 hyperplastic polyp, diverticulosis, int hemorrhoids, repeat in 3 yrs    COLONOSCOPY N/A 04/27/2018    COLONOSCOPY performed by La Nena Sánchez MD at [facility] ENDOSCOPY; tubular adenoma at cecum, hyperplastic polyp at rectum, hemorrhoids, diverticulosis, repeat in 3 yrs    GI      hemorrhoid surgery - in office    VAGINA SURGERY N/A 2/22/2024    LEFORT COLPOCLEISIS REPAIR, CYSTOSCOPY performed by Jayda Díaz MD at [facility] MAIN OR         CURRENT MEDICATIONS       Previous Medications    AMLODIPINE (NORVASC) 5 MG TABLET    Take 1 tablet by mouth daily    AMLODIPINE (NORVASC) 5 MG TABLET    Take 1 tablet by mouth daily    ATORVASTATIN (LIPITOR) 20 MG TABLET    TAKE 1 TABLET BY MOUTH  DAILY FOR HIGH CHOLESTEROL

## 2024-03-06 NOTE — ED NOTES
I spoke with patient again and verified pt is not taking tizanadine. Informed not take this while on cipro.     Lebron Garcia PA-C  03/06/24 [id number]

## 2024-03-19 ENCOUNTER — OFFICE VISIT (OUTPATIENT)
Facility: CLINIC | Age: 79
End: 2024-03-19
Payer: MEDICARE

## 2024-03-19 VITALS
SYSTOLIC BLOOD PRESSURE: 134 MMHG | BODY MASS INDEX: 22.64 KG/M2 | TEMPERATURE: 97.9 F | RESPIRATION RATE: 16 BRPM | OXYGEN SATURATION: 96 % | HEART RATE: 78 BPM | WEIGHT: 127.8 LBS | HEIGHT: 63 IN | DIASTOLIC BLOOD PRESSURE: 75 MMHG

## 2024-03-19 DIAGNOSIS — M79.10 MYALGIA: ICD-10-CM

## 2024-03-19 DIAGNOSIS — E55.9 VITAMIN D DEFICIENCY: ICD-10-CM

## 2024-03-19 DIAGNOSIS — I10 ESSENTIAL HYPERTENSION: Primary | ICD-10-CM

## 2024-03-19 DIAGNOSIS — H65.91 RIGHT NON-SUPPURATIVE OTITIS MEDIA: ICD-10-CM

## 2024-03-19 DIAGNOSIS — E78.2 MIXED HYPERLIPIDEMIA: ICD-10-CM

## 2024-03-19 PROCEDURE — 3078F DIAST BP <80 MM HG: CPT | Performed by: INTERNAL MEDICINE

## 2024-03-19 PROCEDURE — G8427 DOCREV CUR MEDS BY ELIG CLIN: HCPCS | Performed by: INTERNAL MEDICINE

## 2024-03-19 PROCEDURE — G8484 FLU IMMUNIZE NO ADMIN: HCPCS | Performed by: INTERNAL MEDICINE

## 2024-03-19 PROCEDURE — G8399 PT W/DXA RESULTS DOCUMENT: HCPCS | Performed by: INTERNAL MEDICINE

## 2024-03-19 PROCEDURE — 1123F ACP DISCUSS/DSCN MKR DOCD: CPT | Performed by: INTERNAL MEDICINE

## 2024-03-19 PROCEDURE — 1036F TOBACCO NON-USER: CPT | Performed by: INTERNAL MEDICINE

## 2024-03-19 PROCEDURE — 1090F PRES/ABSN URINE INCON ASSESS: CPT | Performed by: INTERNAL MEDICINE

## 2024-03-19 PROCEDURE — G8420 CALC BMI NORM PARAMETERS: HCPCS | Performed by: INTERNAL MEDICINE

## 2024-03-19 PROCEDURE — 3075F SYST BP GE 130 - 139MM HG: CPT | Performed by: INTERNAL MEDICINE

## 2024-03-19 PROCEDURE — 99214 OFFICE O/P EST MOD 30 MIN: CPT | Performed by: INTERNAL MEDICINE

## 2024-03-19 RX ORDER — AMOXICILLIN AND CLAVULANATE POTASSIUM 875; 125 MG/1; MG/1
1 TABLET, FILM COATED ORAL 2 TIMES DAILY
Qty: 14 TABLET | Refills: 0 | Status: SHIPPED | OUTPATIENT
Start: 2024-03-19 | End: 2024-03-26

## 2024-03-19 RX ORDER — DULOXETIN HYDROCHLORIDE 30 MG/1
30 CAPSULE, DELAYED RELEASE ORAL DAILY
Qty: 30 CAPSULE | Refills: 3 | Status: SHIPPED | OUTPATIENT
Start: 2024-03-19

## 2024-03-19 ASSESSMENT — PATIENT HEALTH QUESTIONNAIRE - PHQ9
1. LITTLE INTEREST OR PLEASURE IN DOING THINGS: NOT AT ALL
SUM OF ALL RESPONSES TO PHQ QUESTIONS 1-9: 0
SUM OF ALL RESPONSES TO PHQ QUESTIONS 1-9: 0
2. FEELING DOWN, DEPRESSED OR HOPELESS: NOT AT ALL
SUM OF ALL RESPONSES TO PHQ QUESTIONS 1-9: 0
SUM OF ALL RESPONSES TO PHQ QUESTIONS 1-9: 0
SUM OF ALL RESPONSES TO PHQ9 QUESTIONS 1 & 2: 0

## 2024-03-19 NOTE — PROGRESS NOTES
Zofia Somers  Identified pt with two pt identifiers(name and ).  Chief Complaint   Patient presents with    Hypertension    Otalgia    Pharyngitis     X 1 day       1. Have you been to the ER, urgent care clinic since your last visit?  Hospitalized since your last visit? Urgent care for UTI a few weeks ago     2. Have you seen or consulted any other health care providers outside of the [facility] since your last visit?  Include any pap smears or colon screening. NO      Provider notified of reason for visit, vitals and flowsheets obtained on patients.     Patient received paperwork for advance directive during previous visit but has not completed at this time     Reviewed record In preparation for visit, huddled with provider and have obtained necessary documentation      There are no preventive care reminders to display for this patient.    Wt Readings from Last 3 Encounters:   24 58 kg (127 lb 12.8 oz)   24 58.3 kg (128 lb 8.5 oz)   24 58.1 kg (128 lb)     Temp Readings from Last 3 Encounters:   24 97.9 °F (36.6 °C) (Oral)   24 98.3 °F (36.8 °C) (Oral)   24 97.8 °F (36.6 °C) (Oral)     BP Readings from Last 3 Encounters:   24 134/75   24 (!) 173/62   24 128/61     Pulse Readings from Last 3 Encounters:   24 78   24 84   24 80          No data to display                  Learning Assessment:  :         2023    11:30 AM   [facility] AMB LEARNING ASSESSMENT   Primary Learner Patient   level of education GRADUATED HIGH SCHOOL OR GED   Primary Language ENGLISH   Learning Preference DEMONSTRATION   Answered By Patient   Relationship to Learner SELF       Fall Risk Assessment:  :         9/15/2023     9:20 AM 9/15/2023     6:19 AM 2022     8:00 AM 2021    10:49 AM   Amb Fall Risk Assessment and TUG Test   Do you feel unsteady or are you worried about falling?  no no     2 or more falls in past year? no no     Fall with

## 2024-03-19 NOTE — PROGRESS NOTES
Chief Complaint   Patient presents with    Hypertension    Otalgia    Pharyngitis     X 1 day       HISTORY OF PRESENT ILLNESS  Zofia Somers is a 79 y.o. female    Complains of right ear pain since yesterday. Sharp pain. Radiates down to lateral jaw. Mild sore throat when she swallows.    Found by orthodontist to have bone changes at left jaw. Instructed to stop Prolia and see an oral surgeon. Having a hard time finding an oral surgeon that accepts Medicare. Waiting to hear from [facility].    Denies headaches, CP, SOB, dizziness, or leg swelling. /63 at last appointment. Amlodipine dose increased to 5 mg daily.    Complains of muscle aches at legs or arms after sitting for a prolonged period.    Patient Active Problem List   Diagnosis    Diverticulosis of colon    Osteoporosis    Essential hypertension    Mixed hyperlipidemia    Vitamin D deficiency    History of breast cancer    Prolapse of female genital organs    Atrophic vaginitis     Past Medical History:   Diagnosis Date    Acute pancreatitis 04/10/2017    Due to pancreas divisum    Breast cancer ([facility]) 2010    Cancer ([facility]) 05/2010    R breast, stage IA; T1b,pN0, M0; Dr. Zulema Stallings - surgery/radiation    Deviated septum     HISTORY OF DEVIATED SEPTUM    Eczema 10/09/2014    GERD (gastroesophageal reflux disease)     History of therapeutic radiation     HTN (hypertension)     Hypercholesterolemia     Osteoarthritis (arthritis due to wear and tear of joints)     Osteoporosis     Pancreatitis     Prolapse of female genital organs 01/27/2020    Dr. Jayda Díaz ([facility]). 1/23/20: Pessary placed.     Tubular adenoma of colon 08/21/2018    UTI (urinary tract infection)      Allergies   Allergen Reactions    Macrobid [Nitrofurantoin] Other (See Comments)     Pt states body felt on fire    Zinc Swelling     Jewelry       Current Outpatient Medications   Medication Sig Dispense Refill    amLODIPine (NORVASC) 5 MG tablet Take 1 tablet by mouth daily 10

## 2024-05-12 DIAGNOSIS — I10 ESSENTIAL HYPERTENSION: ICD-10-CM

## 2024-05-13 DIAGNOSIS — M79.10 MYALGIA: ICD-10-CM

## 2024-05-13 RX ORDER — AMLODIPINE BESYLATE 5 MG/1
5 TABLET ORAL DAILY
Qty: 90 TABLET | Refills: 3 | Status: SHIPPED | OUTPATIENT
Start: 2024-05-13

## 2024-05-13 RX ORDER — DULOXETIN HYDROCHLORIDE 30 MG/1
30 CAPSULE, DELAYED RELEASE ORAL DAILY
Qty: 90 CAPSULE | Refills: 3 | Status: SHIPPED | OUTPATIENT
Start: 2024-05-13

## 2024-05-13 NOTE — TELEPHONE ENCOUNTER
PCP: Lisa Doyle MD     Last appt:  3/19/2024      Future Appointments   Date Time Provider Department Center   9/16/2024 11:10 AM Lisa Doyle MD [facility] BS AMB          Requested Prescriptions     Pending Prescriptions Disp Refills    DULoxetine (CYMBALTA) 30 MG extended release capsule 90 capsule 3     Sig: Take 1 capsule by mouth daily

## 2024-05-13 NOTE — TELEPHONE ENCOUNTER
Caller requests Refill of:  DULoxetine (CYMBALTA) 30 MG extended release capsule         Please send to:  [id number] - [place] - [address] - P [phone] - F [phone] (Pharmacy)       Visit Appointment History:  Future Appointments:  Future Appointments   Date Time Provider Department Center   9/16/2024 11:10 AM Lisa Doyle MD [facility] BS AMB         Last Appointment With Me:  3/19/2024         Caller confirmed instructions and dosages as correct.    Caller was advised that Meds will be refilled as soon as possible, however there can be a 48-72 business hour turn around on refill requests.

## 2024-05-13 NOTE — TELEPHONE ENCOUNTER
PCP: Lisa Doyle MD     Last appt:  3/19/2024      Future Appointments   Date Time Provider Department Center   9/16/2024 11:10 AM Lisa Doyle MD [facility] BS AMB          Requested Prescriptions     Pending Prescriptions Disp Refills    amLODIPine (NORVASC) 5 MG tablet [Pharmacy Med Name: amLODIPine Besylate 5 MG Oral Tablet] 90 tablet 3     Sig: TAKE 1 TABLET BY MOUTH DAILY

## 2024-07-29 DIAGNOSIS — E78.2 MIXED HYPERLIPIDEMIA: Primary | ICD-10-CM

## 2024-07-30 RX ORDER — ATORVASTATIN CALCIUM 20 MG/1
20 TABLET, FILM COATED ORAL NIGHTLY
Qty: 90 TABLET | Refills: 3 | Status: SHIPPED | OUTPATIENT
Start: 2024-07-30

## 2024-07-30 NOTE — TELEPHONE ENCOUNTER
PCP: Lisa Doyle MD     Last appt:  3/19/2024      Future Appointments   Date Time Provider Department Center   9/16/2024 11:10 AM Lisa Doyle MD [facility] BS AMB          Requested Prescriptions     Pending Prescriptions Disp Refills    atorvastatin (LIPITOR) 20 MG tablet [Pharmacy Med Name: Atorvastatin Calcium 20 MG Oral Tablet] 90 tablet 2     Sig: Take 1 tablet by mouth nightly

## 2024-08-22 ENCOUNTER — HOSPITAL ENCOUNTER (OUTPATIENT)
Facility: HOSPITAL | Age: 79
Discharge: HOME | End: 2024-08-22
Payer: MEDICARE

## 2024-08-22 DIAGNOSIS — Z12.31 VISIT FOR SCREENING MAMMOGRAM: ICD-10-CM

## 2024-08-22 PROCEDURE — 77063 BREAST TOMOSYNTHESIS BI: CPT

## 2024-09-10 ENCOUNTER — LAB (OUTPATIENT)
Facility: CLINIC | Age: 79
End: 2024-09-10

## 2024-09-10 DIAGNOSIS — E55.9 VITAMIN D DEFICIENCY: ICD-10-CM

## 2024-09-10 DIAGNOSIS — E78.2 MIXED HYPERLIPIDEMIA: ICD-10-CM

## 2024-09-10 DIAGNOSIS — I10 ESSENTIAL HYPERTENSION: ICD-10-CM

## 2024-09-11 LAB
25(OH)D3 SERPL-MCNC: 53 NG/ML (ref 30–100)
ALBUMIN SERPL-MCNC: 4 G/DL (ref 3.5–5)
ALBUMIN/GLOB SERPL: 1.4 (ref 1.1–2.2)
ALP SERPL-CCNC: 74 U/L (ref 45–117)
ALT SERPL-CCNC: 24 U/L (ref 12–78)
ANION GAP SERPL CALC-SCNC: 1 MMOL/L (ref 2–12)
AST SERPL-CCNC: 20 U/L (ref 15–37)
BASOPHILS # BLD: 0.1 K/UL (ref 0–0.1)
BASOPHILS NFR BLD: 1 % (ref 0–1)
BILIRUB SERPL-MCNC: 1 MG/DL (ref 0.2–1)
BUN SERPL-MCNC: 21 MG/DL (ref 6–20)
BUN/CREAT SERPL: 26 (ref 12–20)
CALCIUM SERPL-MCNC: 9.6 MG/DL (ref 8.5–10.1)
CHLORIDE SERPL-SCNC: 103 MMOL/L (ref 97–108)
CHOLEST SERPL-MCNC: 188 MG/DL
CO2 SERPL-SCNC: 34 MMOL/L (ref 21–32)
CREAT SERPL-MCNC: 0.8 MG/DL (ref 0.55–1.02)
DIFFERENTIAL METHOD BLD: ABNORMAL
EOSINOPHIL # BLD: 0.3 K/UL (ref 0–0.4)
EOSINOPHIL NFR BLD: 5 % (ref 0–7)
ERYTHROCYTE [DISTWIDTH] IN BLOOD BY AUTOMATED COUNT: 12.9 % (ref 11.5–14.5)
GLOBULIN SER CALC-MCNC: 2.8 G/DL (ref 2–4)
GLUCOSE SERPL-MCNC: 101 MG/DL (ref 65–100)
HCT VFR BLD AUTO: 34.9 % (ref 35–47)
HDLC SERPL-MCNC: 92 MG/DL
HDLC SERPL: 2 (ref 0–5)
HGB BLD-MCNC: 11.6 G/DL (ref 11.5–16)
IMM GRANULOCYTES # BLD AUTO: 0 K/UL (ref 0–0.04)
IMM GRANULOCYTES NFR BLD AUTO: 0 % (ref 0–0.5)
LDLC SERPL CALC-MCNC: 81.8 MG/DL (ref 0–100)
LYMPHOCYTES # BLD: 1.8 K/UL (ref 0.8–3.5)
LYMPHOCYTES NFR BLD: 28 % (ref 12–49)
MCH RBC QN AUTO: 31.3 PG (ref 26–34)
MCHC RBC AUTO-ENTMCNC: 33.2 G/DL (ref 30–36.5)
MCV RBC AUTO: 94.1 FL (ref 80–99)
MONOCYTES # BLD: 0.8 K/UL (ref 0–1)
MONOCYTES NFR BLD: 13 % (ref 5–13)
NEUTS SEG # BLD: 3.4 K/UL (ref 1.8–8)
NEUTS SEG NFR BLD: 53 % (ref 32–75)
NRBC # BLD: 0 K/UL (ref 0–0.01)
NRBC BLD-RTO: 0 PER 100 WBC
PLATELET # BLD AUTO: 222 K/UL (ref 150–400)
PMV BLD AUTO: 10.7 FL (ref 8.9–12.9)
POTASSIUM SERPL-SCNC: 3.8 MMOL/L (ref 3.5–5.1)
PROT SERPL-MCNC: 6.8 G/DL (ref 6.4–8.2)
RBC # BLD AUTO: 3.71 M/UL (ref 3.8–5.2)
SODIUM SERPL-SCNC: 138 MMOL/L (ref 136–145)
TRIGL SERPL-MCNC: 71 MG/DL
VLDLC SERPL CALC-MCNC: 14.2 MG/DL
WBC # BLD AUTO: 6.4 K/UL (ref 3.6–11)

## 2024-09-16 ENCOUNTER — OFFICE VISIT (OUTPATIENT)
Facility: CLINIC | Age: 79
End: 2024-09-16
Payer: MEDICARE

## 2024-09-16 VITALS
DIASTOLIC BLOOD PRESSURE: 74 MMHG | SYSTOLIC BLOOD PRESSURE: 133 MMHG | BODY MASS INDEX: 22.61 KG/M2 | RESPIRATION RATE: 16 BRPM | HEIGHT: 63 IN | WEIGHT: 127.6 LBS | OXYGEN SATURATION: 96 % | HEART RATE: 76 BPM | TEMPERATURE: 97.8 F

## 2024-09-16 DIAGNOSIS — Z00.00 MEDICARE ANNUAL WELLNESS VISIT, SUBSEQUENT: Primary | ICD-10-CM

## 2024-09-16 DIAGNOSIS — I10 ESSENTIAL HYPERTENSION: ICD-10-CM

## 2024-09-16 DIAGNOSIS — M81.0 AGE-RELATED OSTEOPOROSIS WITHOUT CURRENT PATHOLOGICAL FRACTURE: ICD-10-CM

## 2024-09-16 DIAGNOSIS — E55.9 VITAMIN D DEFICIENCY: ICD-10-CM

## 2024-09-16 DIAGNOSIS — E78.2 MIXED HYPERLIPIDEMIA: ICD-10-CM

## 2024-09-16 PROCEDURE — G8420 CALC BMI NORM PARAMETERS: HCPCS | Performed by: INTERNAL MEDICINE

## 2024-09-16 PROCEDURE — 99214 OFFICE O/P EST MOD 30 MIN: CPT | Performed by: INTERNAL MEDICINE

## 2024-09-16 PROCEDURE — G8427 DOCREV CUR MEDS BY ELIG CLIN: HCPCS | Performed by: INTERNAL MEDICINE

## 2024-09-16 PROCEDURE — 1123F ACP DISCUSS/DSCN MKR DOCD: CPT | Performed by: INTERNAL MEDICINE

## 2024-09-16 PROCEDURE — G8399 PT W/DXA RESULTS DOCUMENT: HCPCS | Performed by: INTERNAL MEDICINE

## 2024-09-16 PROCEDURE — 3075F SYST BP GE 130 - 139MM HG: CPT | Performed by: INTERNAL MEDICINE

## 2024-09-16 PROCEDURE — 1036F TOBACCO NON-USER: CPT | Performed by: INTERNAL MEDICINE

## 2024-09-16 PROCEDURE — 3078F DIAST BP <80 MM HG: CPT | Performed by: INTERNAL MEDICINE

## 2024-09-16 PROCEDURE — 1090F PRES/ABSN URINE INCON ASSESS: CPT | Performed by: INTERNAL MEDICINE

## 2024-09-16 PROCEDURE — G0439 PPPS, SUBSEQ VISIT: HCPCS | Performed by: INTERNAL MEDICINE

## 2024-09-16 RX ORDER — HYDROCHLOROTHIAZIDE 12.5 MG/1
25 CAPSULE ORAL EVERY MORNING
Qty: 90 CAPSULE | Refills: 1
Start: 2024-09-16

## 2024-09-16 SDOH — ECONOMIC STABILITY: FOOD INSECURITY: WITHIN THE PAST 12 MONTHS, THE FOOD YOU BOUGHT JUST DIDN'T LAST AND YOU DIDN'T HAVE MONEY TO GET MORE.: NEVER TRUE

## 2024-09-16 SDOH — ECONOMIC STABILITY: INCOME INSECURITY: HOW HARD IS IT FOR YOU TO PAY FOR THE VERY BASICS LIKE FOOD, HOUSING, MEDICAL CARE, AND HEATING?: NOT HARD AT ALL

## 2024-09-16 SDOH — ECONOMIC STABILITY: FOOD INSECURITY: WITHIN THE PAST 12 MONTHS, YOU WORRIED THAT YOUR FOOD WOULD RUN OUT BEFORE YOU GOT MONEY TO BUY MORE.: NEVER TRUE

## 2024-09-16 ASSESSMENT — PATIENT HEALTH QUESTIONNAIRE - PHQ9
SUM OF ALL RESPONSES TO PHQ QUESTIONS 1-9: 0
SUM OF ALL RESPONSES TO PHQ QUESTIONS 1-9: 0
SUM OF ALL RESPONSES TO PHQ9 QUESTIONS 1 & 2: 0
1. LITTLE INTEREST OR PLEASURE IN DOING THINGS: NOT AT ALL
SUM OF ALL RESPONSES TO PHQ QUESTIONS 1-9: 0
SUM OF ALL RESPONSES TO PHQ QUESTIONS 1-9: 0
2. FEELING DOWN, DEPRESSED OR HOPELESS: NOT AT ALL

## 2024-09-16 ASSESSMENT — LIFESTYLE VARIABLES
HOW MANY STANDARD DRINKS CONTAINING ALCOHOL DO YOU HAVE ON A TYPICAL DAY: 1 OR 2
HOW OFTEN DO YOU HAVE A DRINK CONTAINING ALCOHOL: 2-3 TIMES A WEEK

## 2024-09-18 ENCOUNTER — HOSPITAL ENCOUNTER (OUTPATIENT)
Facility: HOSPITAL | Age: 79
Discharge: HOME | End: 2024-09-21
Payer: MEDICARE

## 2024-09-18 ENCOUNTER — TRANSCRIBE ORDERS (OUTPATIENT)
Facility: HOSPITAL | Age: 79
End: 2024-09-18

## 2024-09-18 VITALS — BODY MASS INDEX: 22.15 KG/M2 | WEIGHT: 125 LBS | HEIGHT: 63 IN

## 2024-09-18 DIAGNOSIS — R92.8 ABNORMAL MAMMOGRAM OF LEFT BREAST: Primary | ICD-10-CM

## 2024-09-18 DIAGNOSIS — R92.8 ABNORMALITY OF LEFT BREAST ON SCREENING MAMMOGRAM: ICD-10-CM

## 2024-09-18 PROCEDURE — G0279 TOMOSYNTHESIS, MAMMO: HCPCS

## 2024-09-18 PROCEDURE — 76642 ULTRASOUND BREAST LIMITED: CPT

## 2024-11-25 ENCOUNTER — ANESTHESIA EVENT (OUTPATIENT)
Facility: HOSPITAL | Age: 79
End: 2024-11-25
Payer: MEDICARE

## 2024-11-25 ENCOUNTER — HOSPITAL ENCOUNTER (OUTPATIENT)
Facility: HOSPITAL | Age: 79
Setting detail: OUTPATIENT SURGERY
Discharge: HOME | End: 2024-11-25
Attending: INTERNAL MEDICINE | Admitting: INTERNAL MEDICINE
Payer: MEDICARE

## 2024-11-25 ENCOUNTER — ANESTHESIA (OUTPATIENT)
Facility: HOSPITAL | Age: 79
End: 2024-11-25
Payer: MEDICARE

## 2024-11-25 VITALS
WEIGHT: 125 LBS | TEMPERATURE: 98.6 F | RESPIRATION RATE: 15 BRPM | SYSTOLIC BLOOD PRESSURE: 132 MMHG | OXYGEN SATURATION: 96 % | HEIGHT: 63 IN | HEART RATE: 66 BPM | DIASTOLIC BLOOD PRESSURE: 60 MMHG | BODY MASS INDEX: 22.15 KG/M2

## 2024-11-25 PROCEDURE — 3600007512: Performed by: INTERNAL MEDICINE

## 2024-11-25 PROCEDURE — 7100000010 HC PHASE II RECOVERY - FIRST 15 MIN: Performed by: INTERNAL MEDICINE

## 2024-11-25 PROCEDURE — 3700000000 HC ANESTHESIA ATTENDED CARE: Performed by: INTERNAL MEDICINE

## 2024-11-25 PROCEDURE — 3600007502: Performed by: INTERNAL MEDICINE

## 2024-11-25 PROCEDURE — 3700000001 HC ADD 15 MINUTES (ANESTHESIA): Performed by: INTERNAL MEDICINE

## 2024-11-25 PROCEDURE — 2709999900 HC NON-CHARGEABLE SUPPLY: Performed by: INTERNAL MEDICINE

## 2024-11-25 PROCEDURE — 6360000002 HC RX W HCPCS: Performed by: NURSE ANESTHETIST, CERTIFIED REGISTERED

## 2024-11-25 PROCEDURE — 7100000011 HC PHASE II RECOVERY - ADDTL 15 MIN: Performed by: INTERNAL MEDICINE

## 2024-11-25 RX ORDER — SODIUM CHLORIDE 0.9 % (FLUSH) 0.9 %
5-40 SYRINGE (ML) INJECTION PRN
Status: CANCELLED | OUTPATIENT
Start: 2024-11-25

## 2024-11-25 RX ORDER — SODIUM CHLORIDE 9 MG/ML
INJECTION, SOLUTION INTRAVENOUS PRN
Status: CANCELLED | OUTPATIENT
Start: 2024-11-25

## 2024-11-25 RX ORDER — SODIUM CHLORIDE 0.9 % (FLUSH) 0.9 %
5-40 SYRINGE (ML) INJECTION EVERY 12 HOURS SCHEDULED
Status: CANCELLED | OUTPATIENT
Start: 2024-11-25

## 2024-11-25 RX ORDER — LIDOCAINE HYDROCHLORIDE 20 MG/ML
INJECTION, SOLUTION EPIDURAL; INFILTRATION; INTRACAUDAL; PERINEURAL
Status: DISCONTINUED | OUTPATIENT
Start: 2024-11-25 | End: 2024-11-25 | Stop reason: SDUPTHER

## 2024-11-25 RX ORDER — SODIUM CHLORIDE 9 MG/ML
INJECTION, SOLUTION INTRAVENOUS CONTINUOUS
Status: CANCELLED | OUTPATIENT
Start: 2024-11-25

## 2024-11-25 RX ADMIN — PROPOFOL 30 MG: 10 INJECTION, EMULSION INTRAVENOUS at 12:06

## 2024-11-25 RX ADMIN — LIDOCAINE HYDROCHLORIDE 25 MG: 20 INJECTION, SOLUTION EPIDURAL; INFILTRATION; INTRACAUDAL; PERINEURAL at 11:58

## 2024-11-25 RX ADMIN — PROPOFOL 30 MG: 10 INJECTION, EMULSION INTRAVENOUS at 12:02

## 2024-11-25 RX ADMIN — PROPOFOL 40 MG: 10 INJECTION, EMULSION INTRAVENOUS at 11:59

## 2024-11-25 RX ADMIN — PROPOFOL 30 MG: 10 INJECTION, EMULSION INTRAVENOUS at 12:05

## 2024-11-25 RX ADMIN — PROPOFOL 50 MG: 10 INJECTION, EMULSION INTRAVENOUS at 11:58

## 2024-11-25 ASSESSMENT — PAIN - FUNCTIONAL ASSESSMENT: PAIN_FUNCTIONAL_ASSESSMENT: NONE - DENIES PAIN

## 2024-11-25 NOTE — OP NOTE
[facility]  [address]  [place] [address] [phone]                              Colonoscopy Procedure Note      Indications:    Family history of coloretal cancer (screening only), Personal history of colon polyps (screening only)     :  La Nena Sánchez MD    Surgical Assistant: Circulator: Bre Washburn, RN; Carolyn White RN    Implants: none    Referring Provider: Lisa Doyle MD    Sedation:  MAC anesthesia    Procedure Details:  After informed consent was obtained with all risks and benefits of procedure explained and preoperative exam completed, the patient was taken to the endoscopy suite and placed in the left lateral decubitus position.  Upon sequential sedation as per above, a digital rectal exam was performed  And was normal.  The Olympus videocolonoscope  was inserted in the rectum and carefully advanced to the cecum, which was identified by the ileocecal valve and appendiceal orifice.  The quality of preparation was good.  The colonoscope was slowly withdrawn with careful evaluation between folds. Retroflexion in the rectum was performed and was normal..     Findings:   Rectum: no mucosal lesion appreciated  Grade 1 internal hemorrhoid(s);  Sigmoid: no mucosal lesion appreciated      -Diverticulosis  Descending Colon: no mucosal lesion appreciated      -Diverticulosis  Transverse Colon: no mucosal lesion appreciated  Ascending Colon: no mucosal lesion appreciated      -Diverticulosis  Cecum: no mucosal lesion appreciated  Terminal Ileum: not intubated    Interventions:  none    Specimen Removed:  * No specimens in log *    Complications: None.     EBL:  None.    Recommendations:   -Repeat colonoscopy in 3 years to be based upon overall health at that time.  -High fiber diet.     -Resume normal medication(s).     Discharge Disposition:  Home in the company of a  when able to ambulate.    La Nena Sánchez MD  11/25/2024  12:15 PM

## 2024-11-25 NOTE — PROGRESS NOTES
Initial RN admission and assessment performed and documented in Endoscopy navigator.     Patient evaluated by anesthesia in pre-procedure holding.     All procedural vital signs, airway assessment, and level of consciousness information monitored and recorded by anesthesia staff on the anesthesia record.     Report received from CRNA post procedure.  Patient transported to recovery area by RN.    Endoscopy post procedure time out was performed and specimens were verified with physician.    Endoscope was pre-cleaned at bedside immediately following procedure by MAHI Leon.

## 2024-11-25 NOTE — ANESTHESIA POSTPROCEDURE EVALUATION
Department of Anesthesiology  Postprocedure Note    Patient: Zofia Somers  MRN: [id number]  YOB: 1945  Date of evaluation: 11/25/2024    Procedure Summary       Date: 11/25/24 Room / Location: [place] [address] [facility] ENDOSCOPY    Anesthesia Start: 1157 Anesthesia Stop: 1210    Procedure: COLONOSCOPY DIAGNOSTIC (Lower GI Region) Diagnosis:       Family history of colonic polyps      (Family history of colonic polyps [Z83.719])    Surgeons: La Nena Sánchez MD Responsible Provider: Maciej Linda MD    Anesthesia Type: MAC ASA Status: 2            Anesthesia Type: MAC    Christiano Phase I: Christiano Score: 10    Christiano Phase II: Christiano Score: 10    Anesthesia Post Evaluation    Patient location during evaluation: PACU  Patient participation: complete - patient participated  Level of consciousness: awake  Pain score: 0  Airway patency: patent  Nausea & Vomiting: no nausea  Cardiovascular status: blood pressure returned to baseline and hemodynamically stable  Respiratory status: acceptable  Hydration status: stable  Pain management: adequate and satisfactory to patient    No notable events documented.

## 2024-11-25 NOTE — ANESTHESIA PRE PROCEDURE
Department of Anesthesiology  Preprocedure Note       Name:  Zofia Somers   Age:  79 y.o.  :  1945                                          MRN:  [id number]         Date:  2024      Surgeon: Surgeon(s):  La Nena Sánchez MD    Procedure: Procedure(s):  COLONOSCOPY DIAGNOSTIC    Medications prior to admission:   Prior to Admission medications    Medication Sig Start Date End Date Taking? Authorizing Provider   hydroCHLOROthiazide 12.5 MG capsule Take 2 capsules by mouth every morning Prescribed by cardiologist 24  Yes Lisa Doyle MD   atorvastatin (LIPITOR) 20 MG tablet Take 1 tablet by mouth nightly 24  Yes Lisa Doyle MD   DULoxetine (CYMBALTA) 30 MG extended release capsule Take 1 capsule by mouth daily 24  Yes Lisa Doyle MD   Multiple Vitamins-Minerals (CENTRUM SILVER 50+WOMEN PO)  23  Yes Provider, MD Jose G   BIOTIN PO Take 250 mg by mouth daily   Yes Automatic Reconciliation, Ar   MINOXIDIL, TOPICAL, 5 % SOLN Apply topically Twice a Week   Yes Automatic Reconciliation, Ar   estradiol (ESTRACE VAGINAL) 0.1 MG/GM vaginal cream Apply topically to area of concern twice weekly 23   Debo Puga MD       Current medications:    No current facility-administered medications for this encounter.       Allergies:    Allergies   Allergen Reactions   • Macrobid [Nitrofurantoin] Other (See Comments)     Pt states body felt on fire   • Zinc Swelling     Jewelry       Problem List:    Patient Active Problem List   Diagnosis Code   • Diverticulosis of colon K57.30   • Osteoporosis M81.0   • Essential hypertension I10   • Mixed hyperlipidemia E78.2   • Vitamin D deficiency E55.9   • History of breast cancer Z85.3   • Prolapse of female genital organs N81.9   • Atrophic vaginitis N95.2       Past Medical History:        Diagnosis Date   • Acute pancreatitis 04/10/2017    Due to pancreas divisum   • Breast cancer (HCC)    • Cancer (HCC) 2010    R breast,

## 2024-12-11 ENCOUNTER — HOSPITAL ENCOUNTER (EMERGENCY)
Facility: HOSPITAL | Age: 79
Discharge: HOME | End: 2024-12-11
Attending: EMERGENCY MEDICINE
Payer: MEDICARE

## 2024-12-11 ENCOUNTER — APPOINTMENT (OUTPATIENT)
Facility: HOSPITAL | Age: 79
End: 2024-12-11
Payer: MEDICARE

## 2024-12-11 VITALS
SYSTOLIC BLOOD PRESSURE: 171 MMHG | DIASTOLIC BLOOD PRESSURE: 84 MMHG | TEMPERATURE: 97.6 F | WEIGHT: 125 LBS | HEIGHT: 63 IN | OXYGEN SATURATION: 96 % | HEART RATE: 72 BPM | BODY MASS INDEX: 22.15 KG/M2 | RESPIRATION RATE: 16 BRPM

## 2024-12-11 DIAGNOSIS — S82.832A CLOSED FRACTURE OF DISTAL END OF LEFT FIBULA, UNSPECIFIED FRACTURE MORPHOLOGY, INITIAL ENCOUNTER: Primary | ICD-10-CM

## 2024-12-11 PROCEDURE — 70450 CT HEAD/BRAIN W/O DYE: CPT

## 2024-12-11 PROCEDURE — 99284 EMERGENCY DEPT VISIT MOD MDM: CPT

## 2024-12-11 PROCEDURE — 73610 X-RAY EXAM OF ANKLE: CPT

## 2024-12-11 RX ORDER — HYDROCODONE BITARTRATE AND ACETAMINOPHEN 5; 325 MG/1; MG/1
1 TABLET ORAL
Status: DISCONTINUED | OUTPATIENT
Start: 2024-12-11 | End: 2024-12-11 | Stop reason: HOSPADM

## 2024-12-11 RX ORDER — POLYETHYLENE GLYCOL 3350 17 G/17G
17 POWDER, FOR SOLUTION ORAL DAILY PRN
Qty: 30 EACH | Refills: 0 | Status: SHIPPED | OUTPATIENT
Start: 2024-12-11 | End: 2025-01-10

## 2024-12-11 RX ORDER — HYDROCODONE BITARTRATE AND ACETAMINOPHEN 5; 325 MG/1; MG/1
1 TABLET ORAL EVERY 4 HOURS PRN
Qty: 18 TABLET | Refills: 0 | Status: SHIPPED | OUTPATIENT
Start: 2024-12-11 | End: 2024-12-14

## 2024-12-11 RX ORDER — ONDANSETRON 4 MG/1
4 TABLET, ORALLY DISINTEGRATING ORAL 3 TIMES DAILY PRN
Qty: 21 TABLET | Refills: 0 | Status: SHIPPED | OUTPATIENT
Start: 2024-12-11

## 2024-12-11 ASSESSMENT — PAIN - FUNCTIONAL ASSESSMENT
PAIN_FUNCTIONAL_ASSESSMENT: ACTIVITIES ARE NOT PREVENTED
PAIN_FUNCTIONAL_ASSESSMENT: 0-10

## 2024-12-11 ASSESSMENT — PAIN DESCRIPTION - LOCATION: LOCATION: FOOT

## 2024-12-11 ASSESSMENT — PAIN DESCRIPTION - PAIN TYPE: TYPE: CHRONIC PAIN;ACUTE PAIN

## 2024-12-11 ASSESSMENT — PAIN SCALES - GENERAL: PAINLEVEL_OUTOF10: 8

## 2024-12-11 ASSESSMENT — PAIN DESCRIPTION - DESCRIPTORS: DESCRIPTORS: ACHING

## 2024-12-11 ASSESSMENT — PAIN DESCRIPTION - ONSET: ONSET: GRADUAL

## 2024-12-11 ASSESSMENT — PAIN DESCRIPTION - ORIENTATION: ORIENTATION: LEFT

## 2024-12-11 NOTE — ED PROVIDER NOTES
[facility] EMERGENCY DEP  EMERGENCY DEPARTMENT ENCOUNTER      Patient Name: Zofia Somers  MRN: [id number]  Birthdate 1945  Date of Evaluation: 12/11/2024  Physician: Santhosh Holguin MD    CHIEF COMPLAINT       Chief Complaint   Patient presents with    Fall       HISTORY OF PRESENT ILLNESS   (Location/Symptom, Timing/Onset, Context/Setting, Quality, Duration, Modifying Factors, Severity)   Zofia Somers, 79 y.o., female     79-year-old female presents with left ankle pain after slipping and falling this morning on a wet surface.  She denies hitting her head or loss of consciousness.  She is not on any blood thinners          Nursing Notes were reviewed.    REVIEW OF SYSTEMS    (Not required)   Review of Systems    Except as noted above the remainder of the review of systems was reviewed and negative.     PAST MEDICAL HISTORY     Past Medical History:   Diagnosis Date    Acute pancreatitis 04/10/2017    Due to pancreas divisum    Breast cancer (HCC) 2010    Cancer (HCC) 05/2010    R breast, stage IA; T1b,pN0, M0; Dr. Zulema Stallings - surgery/radiation    Deviated septum     HISTORY OF DEVIATED SEPTUM    Eczema 10/09/2014    GERD (gastroesophageal reflux disease)     History of therapeutic radiation     HTN (hypertension)     Hypercholesterolemia     Osteoarthritis (arthritis due to wear and tear of joints)     Osteoporosis     Pancreatitis     Prolapse of female genital organs 01/27/2020    Dr. Jayda Díaz ([facility]). 1/23/20: Pessary placed.     Tubular adenoma of colon 08/21/2018    UTI (urinary tract infection)        SURGICAL HISTORY       Past Surgical History:   Procedure Laterality Date    BREAST BIOPSY Right 2010    POSITIVE; CANCEROUS    BREAST LUMPECTOMY Right 2010    right    COLONOSCOPY N/A 11/01/2021    COLONOSCOPY   :- performed by La Nena Sánchez MD at [facility]. 3 tubular adenomas, 1 hyperplastic polyp, diverticulosis, int hemorrhoids, repeat in 3 yrs    COLONOSCOPY N/A 04/27/2018     reportable procedures.  There was a high probability of clinically significant/life threatening deterioration in the patient's condition which required my urgent intervention.     CONSULTS:  None    PROCEDURES:  Unless otherwise noted below, none     Procedures    FINAL IMPRESSION      1. Closed fracture of distal end of left fibula, unspecified fracture morphology, initial encounter          DISPOSITION/PLAN   DISPOSITION Decision To Discharge 12/11/2024 11:39:16 AM    PATIENT REFERRED TO:  Lisa Doyle MD  [address] [facility] [address]  [phone]    Schedule an appointment as soon as possible for a visit       [facility] EMERGENCY DEP  [address] [id number] [phone]    As needed, If symptoms worsen    [facility]  [address]  [place] [facility] [address] [phone]  Schedule an appointment as soon as possible for a visit in 1 day      [facility]  [address], [address]  [place] [facility] [place] [address]  Call in 1 day        DISCHARGE MEDICATIONS:  New Prescriptions    HYDROCODONE-ACETAMINOPHEN (NORCO) 5-325 MG PER TABLET    Take 1 tablet by mouth every 4 hours as needed for Pain for up to 3 days. Intended supply: 3 days. Take lowest dose possible to manage pain Max Daily Amount: 6 tablets    ONDANSETRON (ZOFRAN-ODT) 4 MG DISINTEGRATING TABLET    Take 1 tablet by mouth 3 times daily as needed for Nausea or Vomiting    POLYETHYLENE GLYCOL (MIRALAX) 17 G PACKET    Take 1 packet by mouth daily as needed for Constipation     Controlled Substances Monitoring:          No data to display                (Please note that portions of this note were completed with a voice recognition program.  Efforts were made to edit the dictations but occasionally words are mis-transcribed.)    Santhosh Holguin MD (electronically signed)  Attending Emergency Physician            Santhosh Holguin MD  12/11/24 [id number]

## 2024-12-11 NOTE — ED TRIAGE NOTES
Patient arrives via EMS  after slipping on wet ground this AM and hurting her LEFT ankle. Denies hitting head or any blood thinners. Swelling noted. PVS intact.

## 2025-03-10 ENCOUNTER — LAB (OUTPATIENT)
Facility: CLINIC | Age: 80
End: 2025-03-10

## 2025-03-10 DIAGNOSIS — E55.9 VITAMIN D DEFICIENCY: ICD-10-CM

## 2025-03-10 DIAGNOSIS — I10 ESSENTIAL HYPERTENSION: ICD-10-CM

## 2025-03-10 LAB
25(OH)D3 SERPL-MCNC: 48.1 NG/ML (ref 30–100)
ANION GAP SERPL CALC-SCNC: 4 MMOL/L (ref 2–12)
BUN SERPL-MCNC: 21 MG/DL (ref 6–20)
BUN/CREAT SERPL: 25 (ref 12–20)
CALCIUM SERPL-MCNC: 9.8 MG/DL (ref 8.5–10.1)
CHLORIDE SERPL-SCNC: 105 MMOL/L (ref 97–108)
CO2 SERPL-SCNC: 31 MMOL/L (ref 21–32)
CREAT SERPL-MCNC: 0.83 MG/DL (ref 0.55–1.02)
GLUCOSE SERPL-MCNC: 96 MG/DL (ref 65–100)
POTASSIUM SERPL-SCNC: 3.8 MMOL/L (ref 3.5–5.1)
SODIUM SERPL-SCNC: 140 MMOL/L (ref 136–145)

## 2025-03-15 ENCOUNTER — RESULTS FOLLOW-UP (OUTPATIENT)
Facility: CLINIC | Age: 80
End: 2025-03-15

## 2025-03-17 ENCOUNTER — OFFICE VISIT (OUTPATIENT)
Facility: CLINIC | Age: 80
End: 2025-03-17
Payer: MEDICARE

## 2025-03-17 VITALS
DIASTOLIC BLOOD PRESSURE: 68 MMHG | HEIGHT: 62 IN | HEART RATE: 97 BPM | WEIGHT: 130 LBS | SYSTOLIC BLOOD PRESSURE: 113 MMHG | TEMPERATURE: 97.4 F | RESPIRATION RATE: 16 BRPM | BODY MASS INDEX: 23.92 KG/M2 | OXYGEN SATURATION: 97 %

## 2025-03-17 DIAGNOSIS — I10 ESSENTIAL HYPERTENSION: Primary | ICD-10-CM

## 2025-03-17 DIAGNOSIS — E78.2 MIXED HYPERLIPIDEMIA: ICD-10-CM

## 2025-03-17 DIAGNOSIS — S82.892D CLOSED FRACTURE OF LEFT ANKLE WITH ROUTINE HEALING, SUBSEQUENT ENCOUNTER: ICD-10-CM

## 2025-03-17 DIAGNOSIS — M80.00XD AGE-RELATED OSTEOPOROSIS WITH CURRENT PATHOLOGICAL FRACTURE WITH ROUTINE HEALING, SUBSEQUENT ENCOUNTER: ICD-10-CM

## 2025-03-17 DIAGNOSIS — Z85.3 HISTORY OF BREAST CANCER: ICD-10-CM

## 2025-03-17 DIAGNOSIS — E55.9 VITAMIN D DEFICIENCY: ICD-10-CM

## 2025-03-17 PROCEDURE — 3074F SYST BP LT 130 MM HG: CPT | Performed by: INTERNAL MEDICINE

## 2025-03-17 PROCEDURE — 1090F PRES/ABSN URINE INCON ASSESS: CPT | Performed by: INTERNAL MEDICINE

## 2025-03-17 PROCEDURE — 1160F RVW MEDS BY RX/DR IN RCRD: CPT | Performed by: INTERNAL MEDICINE

## 2025-03-17 PROCEDURE — 1159F MED LIST DOCD IN RCRD: CPT | Performed by: INTERNAL MEDICINE

## 2025-03-17 PROCEDURE — G8420 CALC BMI NORM PARAMETERS: HCPCS | Performed by: INTERNAL MEDICINE

## 2025-03-17 PROCEDURE — 99214 OFFICE O/P EST MOD 30 MIN: CPT | Performed by: INTERNAL MEDICINE

## 2025-03-17 PROCEDURE — G8399 PT W/DXA RESULTS DOCUMENT: HCPCS | Performed by: INTERNAL MEDICINE

## 2025-03-17 PROCEDURE — 1123F ACP DISCUSS/DSCN MKR DOCD: CPT | Performed by: INTERNAL MEDICINE

## 2025-03-17 PROCEDURE — 1126F AMNT PAIN NOTED NONE PRSNT: CPT | Performed by: INTERNAL MEDICINE

## 2025-03-17 PROCEDURE — G8427 DOCREV CUR MEDS BY ELIG CLIN: HCPCS | Performed by: INTERNAL MEDICINE

## 2025-03-17 PROCEDURE — 1036F TOBACCO NON-USER: CPT | Performed by: INTERNAL MEDICINE

## 2025-03-17 PROCEDURE — 3078F DIAST BP <80 MM HG: CPT | Performed by: INTERNAL MEDICINE

## 2025-03-17 SDOH — ECONOMIC STABILITY: FOOD INSECURITY: WITHIN THE PAST 12 MONTHS, THE FOOD YOU BOUGHT JUST DIDN'T LAST AND YOU DIDN'T HAVE MONEY TO GET MORE.: NEVER TRUE

## 2025-03-17 SDOH — ECONOMIC STABILITY: FOOD INSECURITY: WITHIN THE PAST 12 MONTHS, YOU WORRIED THAT YOUR FOOD WOULD RUN OUT BEFORE YOU GOT MONEY TO BUY MORE.: NEVER TRUE

## 2025-03-17 ASSESSMENT — PATIENT HEALTH QUESTIONNAIRE - PHQ9
2. FEELING DOWN, DEPRESSED OR HOPELESS: NOT AT ALL
SUM OF ALL RESPONSES TO PHQ QUESTIONS 1-9: 0
1. LITTLE INTEREST OR PLEASURE IN DOING THINGS: NOT AT ALL

## 2025-03-17 NOTE — PROGRESS NOTES
\"Have you been to the ER, urgent care clinic since your last visit?  Hospitalized since your last visit?\"    Yes [facility] [place]    “Have you seen or consulted any other health care providers outside our system since your last visit?”    NO           
S82.892D       5. History of breast cancer  Z85.3       6. Age-related osteoporosis with current pathological fracture with routine healing, subsequent encounter  M80.00XD [facility] Augusta Eisenberg MD, EndocrinologyTaye ([address])        1. Hypertension: Well-controlled.  - Continue hydrochlorothiazide 12.5 mg 2 tablets daily.    2. Hyperlipidemia.  - Continue current lipid-lowering therapy.    3. Vitamin D deficiency: Controlled.  - Continue current vitamin D supplementation.    4. Left ankle fracture: Nearly healed.  - Follow up with Dr. Dumont as instructed.    5. Breast cancer: History of.  - Scheduled for a follow-up diagnostic mammogram of left breast on March 19, 2025, to evaluate a previously identified spot in the left breast.    6. Osteoporosis.  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  - Plan for a bone density scan in October 2025 to assess the effectiveness of previous treatments.   - [facility] Augusta Eisenberg MD, EndocrinologyTaye ([address])    Return in about 6 months (around 9/17/2025), or if symptoms worsen or fail to improve, for Medicare AWV; have fasting labs 1 week before appointment.     I have discussed the diagnosis with the patient and the intended plan as seen in the above orders. Patient is in agreement. The patient has received an after-visit summary and questions were answered concerning future plans. I have discussed medication side effects and warnings with the patient as well.    The patient (or guardian, if applicable) and other individuals in attendance with the patient were advised that Artificial Intelligence will be utilized during this visit to record and process the conversation to generate a clinical note. The patient (or guardian, if applicable) and other individuals in attendance at the appointment consented to the use of AI, including the recording.

## 2025-03-19 ENCOUNTER — HOSPITAL ENCOUNTER (OUTPATIENT)
Facility: HOSPITAL | Age: 80
Discharge: HOME | End: 2025-03-22
Payer: MEDICARE

## 2025-03-19 VITALS — BODY MASS INDEX: 23.74 KG/M2 | HEIGHT: 62 IN | WEIGHT: 129 LBS

## 2025-03-19 DIAGNOSIS — R92.8 ABNORMAL MAMMOGRAM OF LEFT BREAST: ICD-10-CM

## 2025-03-19 PROCEDURE — G0279 TOMOSYNTHESIS, MAMMO: HCPCS

## 2025-03-19 PROCEDURE — 76642 ULTRASOUND BREAST LIMITED: CPT

## 2025-04-15 ENCOUNTER — TRANSCRIBE ORDERS (OUTPATIENT)
Facility: HOSPITAL | Age: 80
End: 2025-04-15

## 2025-04-15 DIAGNOSIS — Z12.31 SCREENING MAMMOGRAM, ENCOUNTER FOR: Primary | ICD-10-CM

## 2025-05-06 DIAGNOSIS — M79.10 MYALGIA: ICD-10-CM

## 2025-05-06 RX ORDER — DULOXETIN HYDROCHLORIDE 30 MG/1
30 CAPSULE, DELAYED RELEASE ORAL DAILY
Qty: 90 CAPSULE | Refills: 3 | Status: SHIPPED | OUTPATIENT
Start: 2025-05-06

## 2025-05-06 NOTE — TELEPHONE ENCOUNTER
PCP: Lisa Doyle MD     Last appt:  3/17/2025      Future Appointments   Date Time Provider Department Center   8/22/2025 10:30 AM SPT MAMMO 1 SPTMAMMO [facility]   9/10/2025  9:30 AM LAB [facility]   9/18/2025 11:10 AM Lisa Doyle MD [facility]   12/12/2025  1:50 PM Maranda Hung MD [facility]          Requested Prescriptions     Pending Prescriptions Disp Refills    DULoxetine (CYMBALTA) 30 MG extended release capsule 90 capsule 3     Sig: Take 1 capsule by mouth daily

## 2025-07-14 ENCOUNTER — OFFICE VISIT (OUTPATIENT)
Age: 80
End: 2025-07-14
Payer: MEDICARE

## 2025-07-14 VITALS
BODY MASS INDEX: 24.11 KG/M2 | HEART RATE: 74 BPM | SYSTOLIC BLOOD PRESSURE: 155 MMHG | HEIGHT: 62 IN | DIASTOLIC BLOOD PRESSURE: 75 MMHG | WEIGHT: 131 LBS

## 2025-07-14 DIAGNOSIS — M81.0 AGE-RELATED OSTEOPOROSIS WITHOUT CURRENT PATHOLOGICAL FRACTURE: Primary | ICD-10-CM

## 2025-07-14 PROCEDURE — 3077F SYST BP >= 140 MM HG: CPT | Performed by: INTERNAL MEDICINE

## 2025-07-14 PROCEDURE — G2211 COMPLEX E/M VISIT ADD ON: HCPCS | Performed by: INTERNAL MEDICINE

## 2025-07-14 PROCEDURE — 1090F PRES/ABSN URINE INCON ASSESS: CPT | Performed by: INTERNAL MEDICINE

## 2025-07-14 PROCEDURE — G8428 CUR MEDS NOT DOCUMENT: HCPCS | Performed by: INTERNAL MEDICINE

## 2025-07-14 PROCEDURE — 3078F DIAST BP <80 MM HG: CPT | Performed by: INTERNAL MEDICINE

## 2025-07-14 PROCEDURE — 1123F ACP DISCUSS/DSCN MKR DOCD: CPT | Performed by: INTERNAL MEDICINE

## 2025-07-14 PROCEDURE — 99204 OFFICE O/P NEW MOD 45 MIN: CPT | Performed by: INTERNAL MEDICINE

## 2025-07-14 PROCEDURE — G8399 PT W/DXA RESULTS DOCUMENT: HCPCS | Performed by: INTERNAL MEDICINE

## 2025-07-14 PROCEDURE — G8420 CALC BMI NORM PARAMETERS: HCPCS | Performed by: INTERNAL MEDICINE

## 2025-07-14 PROCEDURE — 1036F TOBACCO NON-USER: CPT | Performed by: INTERNAL MEDICINE

## 2025-07-14 RX ORDER — EPINEPHRINE 1 MG/ML
0.3 INJECTION, SOLUTION, CONCENTRATE INTRAVENOUS PRN
OUTPATIENT
Start: 2025-07-14

## 2025-07-14 RX ORDER — SODIUM CHLORIDE 9 MG/ML
INJECTION, SOLUTION INTRAVENOUS PRN
OUTPATIENT
Start: 2025-07-14

## 2025-07-14 RX ORDER — DENOSUMAB 60 MG/ML
INJECTION SUBCUTANEOUS
COMMUNITY
End: 2025-07-14

## 2025-07-14 RX ORDER — DIPHENHYDRAMINE HYDROCHLORIDE 50 MG/ML
50 INJECTION, SOLUTION INTRAMUSCULAR; INTRAVENOUS
OUTPATIENT
Start: 2025-07-14

## 2025-07-14 RX ORDER — LISINOPRIL 10 MG/1
10 TABLET ORAL DAILY
COMMUNITY
Start: 2025-05-13

## 2025-07-14 RX ORDER — ONDANSETRON 2 MG/ML
8 INJECTION INTRAMUSCULAR; INTRAVENOUS
OUTPATIENT
Start: 2025-07-14

## 2025-07-14 RX ORDER — SODIUM CHLORIDE 0.9 % (FLUSH) 0.9 %
5-40 SYRINGE (ML) INJECTION PRN
OUTPATIENT
Start: 2025-07-14

## 2025-07-14 RX ORDER — SODIUM CHLORIDE 9 MG/ML
5-250 INJECTION, SOLUTION INTRAVENOUS PRN
OUTPATIENT
Start: 2025-07-14

## 2025-07-14 RX ORDER — ZOLEDRONIC ACID 0.05 MG/ML
5 INJECTION, SOLUTION INTRAVENOUS ONCE
OUTPATIENT
Start: 2025-07-14 | End: 2025-07-14

## 2025-07-14 RX ORDER — ALBUTEROL SULFATE 90 UG/1
4 INHALANT RESPIRATORY (INHALATION) PRN
OUTPATIENT
Start: 2025-07-14

## 2025-07-14 RX ORDER — HYDROCORTISONE SODIUM SUCCINATE 100 MG/2ML
100 INJECTION INTRAMUSCULAR; INTRAVENOUS
OUTPATIENT
Start: 2025-07-14

## 2025-07-14 RX ORDER — HEPARIN SODIUM (PORCINE) LOCK FLUSH IV SOLN 100 UNIT/ML 100 UNIT/ML
500 SOLUTION INTRAVENOUS PRN
OUTPATIENT
Start: 2025-07-14

## 2025-07-14 RX ORDER — FAMOTIDINE 10 MG/ML
20 INJECTION, SOLUTION INTRAVENOUS
OUTPATIENT
Start: 2025-07-14

## 2025-07-14 RX ORDER — ACETAMINOPHEN 325 MG/1
650 TABLET ORAL
OUTPATIENT
Start: 2025-07-14

## 2025-07-14 NOTE — PROGRESS NOTES
Chief Complaint   Patient presents with    Osteoporosis      Pt is here for bone loss:    Debilitating pain in bilateral shoulders     Initial Diagnosis: h/o breast cancer '10 - BMD done then and started meds then   Prior Treatments: started fosamax but bones still bad so started Prolia   Last dose about a year ago -- no other med started   But then kidney doctor noted needed something else   Saw oral surgeon/2 dentist -- jaw bone is slivering off - told from Prolia     Ca Supplements: MTV  Vit D Supplements: MTV plus OTC  Weight Bearing Exercises: weights 3 x week     History of Fracture (excluding those due to major trauma) No broke foot, broke ankle  Falls or Fractures since last visit No in past 6 mo   Chronic Steroid Use: (>5mg of prednisone in 3 months): No  Smoker/Prior Smoker: No  Alcoholism (more that 3 drinks per day): No  Low BMI: No  Family History of Bone Loss/Fracture in Parents: Yes  mom osteoporosis - no fractures (except wrist; blind)     Early Menopause: Yes  Estrogen after Menopause: No  PPI/H2 Blocker: No  Anit-Estrogen Med: No (only XRT)   Height Loss: No  Secondary work up: no  Dental Issues: Yes    LABS/STUDIES:    Dexa Date Spine Hip Total Hip Neck Forearm FRAX   8/23/21 -0.6 -1.3L, -0.8R -2.0L, -1.6R     10/13/23 +0.1 -1.4L -1.8L -3.1                Lab Results   Component Value Date/Time    TSH 1.690 08/27/2019 09:10 AM    VITD25 48.1 03/10/2025 02:32 PM     Lab Results   Component Value Date/Time    LABGLOM 72 03/10/2025 02:32 PM    LABGLOM 75 09/10/2024 08:12 AM    LABGLOM >60 12/22/2023 12:03 PM    LABGLOM 62 09/12/2023 09:15 AM    LABGLOM >60 07/28/2023 05:17 PM    LABGLOM >60 11/30/2022 01:32 AM    LABGLOM 64 08/26/2022 10:38 AM    GFRAA >60 05/14/2022 09:50 AM     Past Medical History:   Diagnosis Date    Acute pancreatitis 04/10/2017    Due to pancreas divisum    Breast cancer (HCC) 2010    Cancer ([facility]) 05/2010    R breast, stage IA; T1b,pN0, M0; Dr. Zulema Stallings - surgery/radiation

## 2025-07-15 LAB
ALBUMIN SERPL ELPH-MCNC: 4.1 G/DL (ref 2.9–4.4)
ALBUMIN/GLOB SERPL: 1.6 {RATIO} (ref 0.7–1.7)
ALPHA1 GLOB SERPL ELPH-MCNC: 0.2 G/DL (ref 0–0.4)
ALPHA2 GLOB SERPL ELPH-MCNC: 0.7 G/DL (ref 0.4–1)
B-GLOBULIN SERPL ELPH-MCNC: 1 G/DL (ref 0.7–1.3)
BUN SERPL-MCNC: 22 MG/DL (ref 8–27)
BUN/CREAT SERPL: 26 (ref 12–28)
CALCIUM 24H UR-MCNC: 8.9 MG/DL
CALCIUM SERPL-MCNC: 9.8 MG/DL (ref 8.7–10.3)
CHLORIDE SERPL-SCNC: 103 MMOL/L (ref 96–106)
CO2 SERPL-SCNC: 23 MMOL/L (ref 20–29)
CREAT SERPL-MCNC: 0.84 MG/DL (ref 0.57–1)
EGFRCR SERPLBLD CKD-EPI 2021: 71 ML/MIN/1.73
GAMMA GLOB SERPL ELPH-MCNC: 0.7 G/DL (ref 0.4–1.8)
GLOBULIN SER CALC-MCNC: 2.6 G/DL (ref 2.2–3.9)
GLUCOSE SERPL-MCNC: 95 MG/DL (ref 70–99)
LABORATORY COMMENT REPORT: NORMAL
M PROTEIN SERPL ELPH-MCNC: NORMAL G/DL
POTASSIUM SERPL-SCNC: 4.8 MMOL/L (ref 3.5–5.2)
PROT SERPL-MCNC: 6.7 G/DL (ref 6–8.5)
SODIUM SERPL-SCNC: 139 MMOL/L (ref 134–144)

## 2025-07-16 LAB
ALBUMIN MFR UR ELPH: 30.7 %
ALPHA1 GLOB MFR UR ELPH: 6.2 %
ALPHA2 GLOB MFR UR ELPH: 16.7 %
B-GLOBULIN MFR UR ELPH: 36.5 %
GAMMA GLOB MFR UR ELPH: 9.9 %
GLIADIN PEPTIDE IGA SER-ACNC: 3 UNITS (ref 0–19)
GLIADIN PEPTIDE IGG SER-ACNC: 1 UNITS (ref 0–19)
IGA SERPL-MCNC: 189 MG/DL (ref 64–422)
LABORATORY COMMENT REPORT: NORMAL
M PROTEIN MFR UR ELPH: NORMAL %
PROT UR-MCNC: 7.5 MG/DL
PTH-INTACT SERPL-MCNC: 25 PG/ML (ref 15–65)
TTG IGA SER-ACNC: <2 U/ML (ref 0–3)
TTG IGG SER-ACNC: <2 U/ML (ref 0–5)

## 2025-07-18 LAB
COLLAGEN NTX UR-SCNC: 192 NMOL BCE
COLLAGEN NTX/CREAT UR-SRTO: 23 NM BCE/MM CR (ref 0–89)
CREAT UR-MCNC: 96.1 MG/DL
INTERPRETIVE GUIDE:: NORMAL

## 2025-07-22 ENCOUNTER — APPOINTMENT (OUTPATIENT)
Facility: HOSPITAL | Age: 80
End: 2025-07-22
Payer: MEDICARE

## 2025-07-22 ENCOUNTER — HOSPITAL ENCOUNTER (EMERGENCY)
Facility: HOSPITAL | Age: 80
Discharge: HOME | End: 2025-07-22
Attending: EMERGENCY MEDICINE
Payer: MEDICARE

## 2025-07-22 VITALS
BODY MASS INDEX: 24.14 KG/M2 | HEIGHT: 62 IN | RESPIRATION RATE: 15 BRPM | SYSTOLIC BLOOD PRESSURE: 164 MMHG | WEIGHT: 131.17 LBS | TEMPERATURE: 97.5 F | DIASTOLIC BLOOD PRESSURE: 78 MMHG | HEART RATE: 73 BPM | OXYGEN SATURATION: 99 %

## 2025-07-22 DIAGNOSIS — T78.3XXA ANGIOEDEMA, INITIAL ENCOUNTER: Primary | ICD-10-CM

## 2025-07-22 LAB
ALBUMIN SERPL-MCNC: 3.9 G/DL (ref 3.5–5)
ALBUMIN/GLOB SERPL: 1.1 (ref 1.1–2.2)
ALP SERPL-CCNC: 76 U/L (ref 45–117)
ALT SERPL-CCNC: 26 U/L (ref 12–78)
ANION GAP SERPL CALC-SCNC: 9 MMOL/L (ref 2–12)
AST SERPL-CCNC: 33 U/L (ref 15–37)
BASOPHILS # BLD: 0.08 K/UL (ref 0–0.1)
BASOPHILS NFR BLD: 1.1 % (ref 0–1)
BILIRUB SERPL-MCNC: 0.6 MG/DL (ref 0.2–1)
BUN SERPL-MCNC: 19 MG/DL (ref 6–20)
BUN/CREAT SERPL: 19 (ref 12–20)
CALCIUM SERPL-MCNC: 9 MG/DL (ref 8.5–10.1)
CHLORIDE SERPL-SCNC: 101 MMOL/L (ref 97–108)
CO2 SERPL-SCNC: 29 MMOL/L (ref 21–32)
CREAT SERPL-MCNC: 0.98 MG/DL (ref 0.55–1.02)
DIFFERENTIAL METHOD BLD: ABNORMAL
EOSINOPHIL # BLD: 0.34 K/UL (ref 0–0.4)
EOSINOPHIL NFR BLD: 4.6 % (ref 0–7)
ERYTHROCYTE [DISTWIDTH] IN BLOOD BY AUTOMATED COUNT: 12.8 % (ref 11.5–14.5)
GLOBULIN SER CALC-MCNC: 3.6 G/DL (ref 2–4)
GLUCOSE SERPL-MCNC: 89 MG/DL (ref 65–100)
HCT VFR BLD AUTO: 35.4 % (ref 35–47)
HGB BLD-MCNC: 12.1 G/DL (ref 11.5–16)
IMM GRANULOCYTES # BLD AUTO: 0.01 K/UL (ref 0–0.04)
IMM GRANULOCYTES NFR BLD AUTO: 0.1 % (ref 0–0.5)
LYMPHOCYTES # BLD: 1.97 K/UL (ref 0.8–3.5)
LYMPHOCYTES NFR BLD: 26.8 % (ref 12–49)
MCH RBC QN AUTO: 31.4 PG (ref 26–34)
MCHC RBC AUTO-ENTMCNC: 34.2 G/DL (ref 30–36.5)
MCV RBC AUTO: 91.9 FL (ref 80–99)
MONOCYTES # BLD: 0.85 K/UL (ref 0–1)
MONOCYTES NFR BLD: 11.6 % (ref 5–13)
NEUTS SEG # BLD: 4.09 K/UL (ref 1.8–8)
NEUTS SEG NFR BLD: 55.8 % (ref 32–75)
NRBC # BLD: 0 K/UL (ref 0–0.01)
NRBC BLD-RTO: 0 PER 100 WBC
PLATELET # BLD AUTO: 247 K/UL (ref 150–400)
PMV BLD AUTO: 10.5 FL (ref 8.9–12.9)
POTASSIUM SERPL-SCNC: 4.6 MMOL/L (ref 3.5–5.1)
POTASSIUM SERPL-SCNC: ABNORMAL MMOL/L (ref 3.5–5.1)
PROT SERPL-MCNC: 7.5 G/DL (ref 6.4–8.2)
RBC # BLD AUTO: 3.85 M/UL (ref 3.8–5.2)
SODIUM SERPL-SCNC: 139 MMOL/L (ref 136–145)
WBC # BLD AUTO: 7.3 K/UL (ref 3.6–11)

## 2025-07-22 PROCEDURE — 85025 COMPLETE CBC W/AUTO DIFF WBC: CPT

## 2025-07-22 PROCEDURE — 80053 COMPREHEN METABOLIC PANEL: CPT

## 2025-07-22 PROCEDURE — 84132 ASSAY OF SERUM POTASSIUM: CPT

## 2025-07-22 PROCEDURE — 99285 EMERGENCY DEPT VISIT HI MDM: CPT

## 2025-07-22 PROCEDURE — 6360000004 HC RX CONTRAST MEDICATION: Performed by: EMERGENCY MEDICINE

## 2025-07-22 PROCEDURE — 70487 CT MAXILLOFACIAL W/DYE: CPT

## 2025-07-22 RX ORDER — IOPAMIDOL 612 MG/ML
100 INJECTION, SOLUTION INTRAVASCULAR
Status: COMPLETED | OUTPATIENT
Start: 2025-07-22 | End: 2025-07-22

## 2025-07-22 RX ADMIN — IOPAMIDOL 100 ML: 612 INJECTION, SOLUTION INTRAVENOUS at 13:41

## 2025-07-22 ASSESSMENT — ENCOUNTER SYMPTOMS
DIARRHEA: 0
FACIAL SWELLING: 1
ABDOMINAL PAIN: 0
BACK PAIN: 0
COLOR CHANGE: 0
VOMITING: 0
CONSTIPATION: 0
SHORTNESS OF BREATH: 0
NAUSEA: 0

## 2025-07-22 ASSESSMENT — PAIN DESCRIPTION - ORIENTATION: ORIENTATION: RIGHT

## 2025-07-22 ASSESSMENT — PAIN SCALES - GENERAL
PAINLEVEL_OUTOF10: 0
PAINLEVEL_OUTOF10: 2

## 2025-07-22 ASSESSMENT — PAIN DESCRIPTION - LOCATION: LOCATION: EYE

## 2025-07-22 ASSESSMENT — PAIN DESCRIPTION - DESCRIPTORS: DESCRIPTORS: TIGHTNESS

## 2025-07-22 NOTE — ED TRIAGE NOTES
ED triage note: ambulatory with a steady gait. Patient reports woke up around 0800 this morning and felt like right side of face is swollen. Denies trouble swallowing or trouble breathing. Denies nausea or vomiting.

## 2025-07-22 NOTE — ED PROVIDER NOTES
[facility] EMERGENCY DEPARTMENT  EMERGENCY DEPARTMENT ENCOUNTER      Pt Name: Zofia Somers  MRN: [id number]  Birthdate 1945  Date of evaluation: 7/22/2025  Provider: Arcadio Vasquez MD    CHIEF COMPLAINT       Chief Complaint   Patient presents with    Facial Swelling         HISTORY OF PRESENT ILLNESS   (Location/Symptom, Timing/Onset, Context/Setting, Quality, Duration, Modifying Factors, Severity)  Note limiting factors.   Zofia Somers is a 80 y.o. female who presents to the emergency department      The history is provided by the patient. No  was used.       Nursing Notes were reviewed.    REVIEW OF SYSTEMS    (2-9 systems for level 4, 10 or more for level 5)     Review of Systems   Constitutional:  Negative for activity change, chills and fever.   HENT:  Positive for facial swelling. Negative for nosebleeds.    Eyes:  Negative for visual disturbance.   Respiratory:  Negative for shortness of breath.    Cardiovascular:  Negative for chest pain and palpitations.   Gastrointestinal:  Negative for abdominal pain, constipation, diarrhea, nausea and vomiting.   Genitourinary:  Negative for difficulty urinating, dysuria, hematuria and urgency.   Musculoskeletal:  Negative for back pain, neck pain and neck stiffness.   Skin:  Negative for color change.   Allergic/Immunologic: Negative for immunocompromised state.   Neurological:  Negative for dizziness, seizures, syncope, weakness, light-headedness, numbness and headaches.   Psychiatric/Behavioral:  Negative for behavioral problems, confusion, hallucinations, self-injury and suicidal ideas.        Except as noted above the remainder of the review of systems was reviewed and negative.       PAST MEDICAL HISTORY     Past Medical History:   Diagnosis Date    Acute pancreatitis 04/10/2017    Due to pancreas divisum    Breast cancer (HCC) 2010    Cancer (HCC) 05/2010    R breast, stage IA; T1b,pN0, M0; Dr. Zulema Stallings - surgery/radiation

## 2025-07-28 ENCOUNTER — HOSPITAL ENCOUNTER (OUTPATIENT)
Facility: HOSPITAL | Age: 80
Setting detail: INFUSION SERIES
End: 2025-07-28

## 2025-07-29 NOTE — PROGRESS NOTES
Telephone order 7/29/25 at 1200 - ok to treat with Reclast following ED visit 7/22/25. Spoke with Matilde Betts on behalf of Dr. Hung.    Lily Porras, PharmD

## 2025-08-05 ENCOUNTER — HOSPITAL ENCOUNTER (OUTPATIENT)
Facility: HOSPITAL | Age: 80
Setting detail: INFUSION SERIES
Discharge: HOME | End: 2025-08-05
Payer: MEDICARE

## 2025-08-05 VITALS
SYSTOLIC BLOOD PRESSURE: 124 MMHG | WEIGHT: 130.6 LBS | HEIGHT: 63 IN | RESPIRATION RATE: 18 BRPM | DIASTOLIC BLOOD PRESSURE: 76 MMHG | BODY MASS INDEX: 23.14 KG/M2 | TEMPERATURE: 97.1 F | HEART RATE: 69 BPM

## 2025-08-05 DIAGNOSIS — M81.0 AGE-RELATED OSTEOPOROSIS WITHOUT CURRENT PATHOLOGICAL FRACTURE: Primary | ICD-10-CM

## 2025-08-05 PROCEDURE — 6360000002 HC RX W HCPCS: Performed by: INTERNAL MEDICINE

## 2025-08-05 PROCEDURE — 96374 THER/PROPH/DIAG INJ IV PUSH: CPT

## 2025-08-05 RX ORDER — SODIUM CHLORIDE 9 MG/ML
5-250 INJECTION, SOLUTION INTRAVENOUS PRN
OUTPATIENT
Start: 2026-08-02

## 2025-08-05 RX ORDER — SODIUM CHLORIDE 9 MG/ML
5-250 INJECTION, SOLUTION INTRAVENOUS PRN
Status: DISCONTINUED | OUTPATIENT
Start: 2025-08-05 | End: 2025-08-06 | Stop reason: HOSPADM

## 2025-08-05 RX ORDER — DIPHENHYDRAMINE HYDROCHLORIDE 50 MG/ML
50 INJECTION, SOLUTION INTRAMUSCULAR; INTRAVENOUS
OUTPATIENT
Start: 2026-08-02

## 2025-08-05 RX ORDER — SODIUM CHLORIDE 9 MG/ML
INJECTION, SOLUTION INTRAVENOUS PRN
OUTPATIENT
Start: 2026-08-02

## 2025-08-05 RX ORDER — ONDANSETRON 2 MG/ML
8 INJECTION INTRAMUSCULAR; INTRAVENOUS
OUTPATIENT
Start: 2026-08-02

## 2025-08-05 RX ORDER — SODIUM CHLORIDE 0.9 % (FLUSH) 0.9 %
5-40 SYRINGE (ML) INJECTION PRN
Status: DISCONTINUED | OUTPATIENT
Start: 2025-08-05 | End: 2025-08-06 | Stop reason: HOSPADM

## 2025-08-05 RX ORDER — SODIUM CHLORIDE 0.9 % (FLUSH) 0.9 %
5-40 SYRINGE (ML) INJECTION PRN
OUTPATIENT
Start: 2026-08-02

## 2025-08-05 RX ORDER — EPINEPHRINE 1 MG/ML
0.3 INJECTION, SOLUTION INTRAMUSCULAR; SUBCUTANEOUS PRN
OUTPATIENT
Start: 2026-08-02

## 2025-08-05 RX ORDER — ACETAMINOPHEN 325 MG/1
650 TABLET ORAL
OUTPATIENT
Start: 2026-08-02

## 2025-08-05 RX ORDER — ZOLEDRONIC ACID 0.05 MG/ML
5 INJECTION, SOLUTION INTRAVENOUS ONCE
OUTPATIENT
Start: 2026-08-02 | End: 2026-08-02

## 2025-08-05 RX ORDER — HEPARIN 100 UNIT/ML
500 SYRINGE INTRAVENOUS PRN
OUTPATIENT
Start: 2026-08-02

## 2025-08-05 RX ORDER — HYDROCORTISONE SODIUM SUCCINATE 100 MG/2ML
100 INJECTION INTRAMUSCULAR; INTRAVENOUS
OUTPATIENT
Start: 2026-08-02

## 2025-08-05 RX ORDER — ALBUTEROL SULFATE 90 UG/1
4 INHALANT RESPIRATORY (INHALATION) PRN
OUTPATIENT
Start: 2026-08-02

## 2025-08-05 RX ORDER — ZOLEDRONIC ACID 0.05 MG/ML
5 INJECTION, SOLUTION INTRAVENOUS ONCE
Status: COMPLETED | OUTPATIENT
Start: 2025-08-05 | End: 2025-08-05

## 2025-08-05 RX ADMIN — ZOLEDRONIC ACID 5 MG: 5 INJECTION INTRAVENOUS at 09:42

## 2025-08-05 ASSESSMENT — PAIN SCALES - GENERAL: PAINLEVEL_OUTOF10: 0

## 2025-08-20 DIAGNOSIS — E78.2 MIXED HYPERLIPIDEMIA: ICD-10-CM

## 2025-08-21 RX ORDER — ATORVASTATIN CALCIUM 20 MG/1
20 TABLET, FILM COATED ORAL NIGHTLY
Qty: 90 TABLET | Refills: 3 | Status: SHIPPED | OUTPATIENT
Start: 2025-08-21

## (undated) DEVICE — BW-412T DISP COMBO CLEANING BRUSH: Brand: SINGLE USE COMBINATION CLEANING BRUSH

## (undated) DEVICE — SUTURE VCRL SZ 0 L27IN ABSRB UD L26MM CT-2 1/2 CIR J270H

## (undated) DEVICE — CONNECTOR TBNG AUX H2O JET DISP FOR OLY 160/180 SER

## (undated) DEVICE — DRAPE SURG CYSTO N REINF ST W O FLD PCH STD

## (undated) DEVICE — SHEET,DRAPE,UNDERBUTTOCK,GRAD POUCH,PORT: Brand: MEDLINE

## (undated) DEVICE — SYRINGE MED 20ML STD CLR PLAS LUERLOCK TIP N CTRL DISP

## (undated) DEVICE — FORCEPS BX L240CM JAW DIA2.8MM L CAP W/ NDL MIC MESH TOOTH

## (undated) DEVICE — SET ADMIN 16ML TBNG L100IN 2 Y INJ SITE IV PIGGY BK DISP

## (undated) DEVICE — 1200 GUARD II KIT W/5MM TUBE W/O VAC TUBE: Brand: GUARDIAN

## (undated) DEVICE — ELECTRODE PT RET AD L9FT HI MOIST COND ADH HYDRGEL CORDED

## (undated) DEVICE — SNARE POLYP SM AD W13MMXL240CM SHTH DIA2.4MM HEX STIFF

## (undated) DEVICE — SUPPLEMENT DIGESTIVE H2O SOL GI-EASE

## (undated) DEVICE — Device: Brand: SINGLE USE INJECTOR NM600/610

## (undated) DEVICE — TRAP SURG QUAD PARABOLA SLOT DSGN SFTY SCRN TRAPEASE

## (undated) DEVICE — QUILTED PREMIUM COMFORT UNDERPAD,EXTRA HEAVY: Brand: WINGS

## (undated) DEVICE — GLOVE SURG SZ 65 L12IN FNGR THK79MIL GRN LTX FREE

## (undated) DEVICE — TUBING HYDR IRR --

## (undated) DEVICE — CONTAINER SPEC 20 ML LID NEUT BUFF FORMALIN 10 % POLYPR STS

## (undated) DEVICE — PREMIUM WET SKIN PREP TRAY: Brand: MEDLINE INDUSTRIES, INC.

## (undated) DEVICE — NEEDLE HYPO 18GA L1.5IN PNK S STL HUB POLYPR SHLD REG BVL

## (undated) DEVICE — SUTURE VCRL SZ 2-0 L27IN ABSRB VLT L26MM UR-6 5/8 CIR J602H

## (undated) DEVICE — Device

## (undated) DEVICE — 1LYRTR 16FR10ML 100%SILI SNAP: Brand: MEDLINE INDUSTRIES, INC.

## (undated) DEVICE — Z DISCONTINUED NO SUB IDED SET EXTN W/ 4 W STPCOCK M SPIN LOK 36IN

## (undated) DEVICE — CYSTO/BLADDER IRRIGATION SET, REGULATING CLAMP

## (undated) DEVICE — SOLIDIFIER FLUID 3000 CC ABSORB

## (undated) DEVICE — PACK,BASIC,SIRUS,V: Brand: MEDLINE

## (undated) DEVICE — SYRINGE MED 10ML TRNSLUC BRL PLUNG BLK MRK POLYPR CTRL

## (undated) DEVICE — SOLUTION IRRIGATION STRL H2O 1000 ML UROMATIC CONTAINER

## (undated) DEVICE — HOOK RETRCT L5MM E SHRP SELF RET SYS LONE STAR

## (undated) DEVICE — AIRLIFE™ U/CONNECT-IT OXYGEN TUBING 7 FEET (2.1 M) CRUSH-RESISTANT OXYGEN TUBING, VINYL TIPPED: Brand: AIRLIFE™

## (undated) DEVICE — ENDO CARRY-ON PROCEDURE KIT INCLUDES ENZYMATIC SPONGE, GAUZE, BIOHAZARD LABEL, TRAY, LUBRICANT, DIRTY SCOPE LABEL, WATER LABEL, TRAY, DRAWSTRING PAD, AND DEFENDO 4-PIECE KIT.: Brand: ENDO CARRY-ON PROCEDURE KIT

## (undated) DEVICE — INTENT OT USE PROVIDES A STERILE INTERFACE BETWEEN THE OPERATING ROOM SURGICAL LAMPS (NON-STERILE) AND THE SURGEON OR STAFF WORKING IN THE STERILE FIELD.: Brand: ASPEN® ALC PLUS LIGHT HANDLE COVER

## (undated) DEVICE — CATH IV AUTOGRD BC BLU 22GA 25 -- INSYTE

## (undated) DEVICE — KIT IV STRT W CHLORAPREP PD 1ML

## (undated) DEVICE — HYPODERMIC SAFETY NEEDLE: Brand: MAGELLAN

## (undated) DEVICE — PENCIL ES CRD L10FT HND SWCHING ROCK SWCH W/ EDGE COAT BLDE

## (undated) DEVICE — BAG SPEC BIOHZD LF 2MIL 6X10IN -- CONVERT TO ITEM 357326

## (undated) DEVICE — KENDALL RADIOLUCENT FOAM MONITORING ELECTRODE -RECTANGULAR SHAPE: Brand: KENDALL

## (undated) DEVICE — GLOVE SURG SZ 65 L12IN FNGR THK94MIL STD WHT LTX FREE

## (undated) DEVICE — SNARE ENDOSCP M L240CM W27MM SHTH DIA2.4MM CHN 2.8MM OVL

## (undated) DEVICE — SOLUTION INJ 1000ML ST H2O FLX CONT

## (undated) DEVICE — BAG BELONG PT PERS CLEAR HANDL

## (undated) DEVICE — Z DISCONTINUED USE 2751540 TUBING IRRIG L10IN DISP PMP ENDOGATOR

## (undated) DEVICE — REM POLYHESIVE ADULT PATIENT RETURN ELECTRODE: Brand: VALLEYLAB

## (undated) DEVICE — BASIN ST MAJOR-NO CAUTERY: Brand: MEDLINE INDUSTRIES, INC.

## (undated) DEVICE — TOWEL SURG W17XL27IN STD BLU COT NONFENESTRATED PREWASHED

## (undated) DEVICE — PAD,SANITARY,11 IN,MAXI,N-STRL,IND WRAP: Brand: MEDLINE